# Patient Record
Sex: MALE | Race: WHITE | NOT HISPANIC OR LATINO | Employment: OTHER | URBAN - METROPOLITAN AREA
[De-identification: names, ages, dates, MRNs, and addresses within clinical notes are randomized per-mention and may not be internally consistent; named-entity substitution may affect disease eponyms.]

---

## 2017-06-19 ENCOUNTER — OFFICE VISIT (OUTPATIENT)
Dept: PULMONOLOGY | Facility: CLINIC | Age: 82
End: 2017-06-19

## 2017-06-19 VITALS
OXYGEN SATURATION: 96 % | SYSTOLIC BLOOD PRESSURE: 126 MMHG | TEMPERATURE: 97.9 F | DIASTOLIC BLOOD PRESSURE: 80 MMHG | HEIGHT: 68 IN | BODY MASS INDEX: 26.76 KG/M2 | RESPIRATION RATE: 16 BRPM | HEART RATE: 60 BPM | WEIGHT: 176.6 LBS

## 2017-06-19 DIAGNOSIS — J45.20 CHRONIC ASTHMA, MILD INTERMITTENT, UNCOMPLICATED: ICD-10-CM

## 2017-06-19 DIAGNOSIS — J84.10 CHRONIC FIBROSIS OF LUNG (HCC): Primary | ICD-10-CM

## 2017-06-19 DIAGNOSIS — J98.6 DIAPHRAGMATIC PARALYSIS: ICD-10-CM

## 2017-06-19 DIAGNOSIS — J30.1 SEASONAL ALLERGIC RHINITIS DUE TO POLLEN: ICD-10-CM

## 2017-06-19 PROCEDURE — 99214 OFFICE O/P EST MOD 30 MIN: CPT | Performed by: INTERNAL MEDICINE

## 2017-06-19 PROCEDURE — 94726 PLETHYSMOGRAPHY LUNG VOLUMES: CPT | Performed by: INTERNAL MEDICINE

## 2017-06-19 PROCEDURE — 94620 PR PULMONARY STRESS TESTING,SIMPLE: CPT | Performed by: INTERNAL MEDICINE

## 2017-06-19 PROCEDURE — 94729 DIFFUSING CAPACITY: CPT | Performed by: INTERNAL MEDICINE

## 2017-06-19 PROCEDURE — 71020 CHG CHEST X-RAY 2 VW: CPT | Performed by: INTERNAL MEDICINE

## 2017-06-19 PROCEDURE — 94060 EVALUATION OF WHEEZING: CPT | Performed by: INTERNAL MEDICINE

## 2017-06-19 NOTE — PATIENT INSTRUCTIONS
Your breathing test today showed some mild loss of lung capacity. To make sure that this is not a progressive pattern, I will want to recheck these studies in 6 months.    Recommendation:  1. Continue to get yearly flu vaccine in the fall  2. Follow-up in the office with repeat breathing test in 6 months  3. We will walk you in the office today to make sure that your oxygen saturation remains stable. If I determine that you require oxygen with activity, I will want to get a CT scan of your chest to make sure you are not developing increased pulmonary fibrosis

## 2017-06-19 NOTE — PROGRESS NOTES
Subjective   Amador White is an 81 y.o.  white male whom I followed since 10/29/2008 for the following problems:  1. Chronic pulmonary fibrosis in the lower lobes right greater than left  A. Pleural calcifications, either asbestos or related to severe injury after thoracic surgery for aneurysm repair  B. Bronchiectasis equivalent with recurrent infections and previously required rotating antibiotics  C. Chronic fibrotic rales in the bases right greater than left  2. Status post thoracic aortic aneurysm repair 1993  A. 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 Chronic mild asthma never required regular therapy  4. Allergic rhinitis  5. Hypertension  6. History of tremor now diagnosed as Parkinson's since I last saw him  7. History of migraines  8. Hypothyroid on replacement  9. Parkinsonism  History of Present Illness   I last saw the patient 9/15/16 at which point he told me he had a new diagnosis of Parkinson's disease and had been put on Sinemet. He is followed by both Dr. Dumont as well as Dr. Maza who specializes in Parkinson's disease at [facility].    Since his last visit his weight has increased about 8 pounds he feels due to inactivity. His Parkinson's is under reasonable control but he does have some gait abnormalities as well as jaw in hand tremor making him less active. He is stable however and performs his usual activities around the house.    From a pulmonary standpoint he denies chronic cough, purulent sputum production, hemoptysis or pleuritic pain. He does note occasional shortness of breath that is not necessarily exertionally related. It seems more related to weather changes and increase in humidity. He will note shortness of breath and a chest heaviness at rest that resolves when he uses his prn albuterol. Only does this about once a week and he does note some wheezing with the episode. Denies any allergic symptomatology is present and has not had to  use his Flovent on a regular basis.    The following portions of the patient's history were reviewed and updated as appropriate: allergies, current medications, past family history, past medical history, past social history, past surgical history and problem list.    Review of Systems   Constitutional: Positive for fatigue. Negative for appetite change, chills, diaphoresis, fever and unexpected weight change.   HENT: Negative for congestion, ear discharge, ear pain, hearing loss, postnasal drip, rhinorrhea, sinus pressure, sneezing, sore throat, trouble swallowing and voice change.    Eyes: Negative for visual disturbance.   Respiratory: Positive for shortness of breath and wheezing (rarely). Negative for cough, choking, chest tightness and stridor.    Cardiovascular: Negative for chest pain, palpitations and leg swelling.   Gastrointestinal: Negative for abdominal pain, anal bleeding, blood in stool, constipation, diarrhea, nausea and vomiting.   Endocrine: Negative for cold intolerance, heat intolerance, polydipsia and polyuria.   Genitourinary: Negative for decreased urine volume, difficulty urinating, dysuria, frequency, hematuria and urgency.   Musculoskeletal: Negative for arthralgias, back pain, joint swelling and myalgias.   Skin: Negative for pallor and rash.   Allergic/Immunologic: Negative for environmental allergies, food allergies and immunocompromised state.   Neurological: Positive for tremors, weakness and headaches (cluster/migraine headaches twice a month). Negative for dizziness, seizures, syncope and speech difficulty.        Mild gait instability   Hematological: Negative for adenopathy. Does not bruise/bleed easily.   Psychiatric/Behavioral: Positive for sleep disturbance. Negative for confusion and decreased concentration. The patient is not nervous/anxious.    All other systems reviewed and are negative.      Prior to Admission medications    Medication Sig Start Date End Date Taking?  "Authorizing Provider   albuterol (VENTOLIN HFA) 108 (90 BASE) MCG/ACT inhaler as needed. 4/7/15  Yes Historical Provider, MD   carbidopa-levodopa (SINEMET)  MG per tablet Take 2 tablets by mouth 3 (three) times a day. 8/29/16  Yes Historical Provider, MD   divalproex (DEPAKOTE) 500 MG 24 hr tablet Take 1 tablet by mouth daily. 8/12/16  Yes Historical Provider, MD   escitalopram (LEXAPRO) 20 MG tablet Take 1 tablet by mouth daily. 8/17/16  Yes Historical Provider, MD   finasteride (PROSCAR) 5 MG tablet Take 1 tablet by mouth daily. 7/5/16  Yes Historical Provider, MD   fluticasone (FLONASE) 50 MCG/ACT nasal spray into each nostril as needed. 8/15/13  Yes Historical Provider, MD   gabapentin (NEURONTIN) 300 MG capsule Take 10 capsules by mouth daily. 9/8/16  Yes Historical Provider, MD   levothyroxine (SYNTHROID, LEVOTHROID) 50 MCG tablet Take 1 tablet by mouth daily. 9/1/16  Yes Historical Provider, MD   propranolol LA (INDERAL LA) 120 MG 24 hr capsule Take 1 capsule by mouth daily. 7/31/16  Yes Historical Provider, MD   simvastatin (ZOCOR) 20 MG tablet Take 0.5 tablets by mouth daily. 9/1/16  Yes Historical Provider, MD   SUMAtriptan (IMITREX) 100 MG tablet as needed. 8/30/16  Yes Historical Provider, MD   topiramate (TOPAMAX) 200 MG tablet Take 1 tablet by mouth daily. 8/1/16  Yes Historical Provider, MD       Objective   Blood pressure 126/80, pulse 60, temperature 97.9 °F (36.6 °C), resp. rate 16, height 68\" (172.7 cm), weight 176 lb 9.6 oz (80.1 kg), SpO2 96 %.    Flowsheet Rows         First Filed Value    Admission Height  68\" (172.7 cm) Documented at 06/19/2017 1036    Admission Weight  176 lb 9.6 oz (80.1 kg) Documented at 06/19/2017 1036        Physical Exam   Constitutional: He is oriented to person, place, and time. He appears well-developed and well-nourished.   Elderly white male who appears younger than stated age   HENT:   Head: Normocephalic and atraumatic.   Right Ear: Hearing and external ear " normal.   Left Ear: Hearing and external ear normal.   Nose: Nose normal.   Mouth/Throat: Oropharynx is clear and moist. No oropharyngeal exudate.   Decreased hearing and wears hearing aids   Eyes: Conjunctivae and EOM are normal. Pupils are equal, round, and reactive to light. Right eye exhibits no discharge. Left eye exhibits no discharge. No scleral icterus.   Neck: Normal range of motion. Neck supple. No JVD present. No tracheal deviation present. No thyromegaly present.   Cardiovascular: Normal rate, regular rhythm, normal heart sounds and intact distal pulses.  Exam reveals no gallop and no friction rub.    No murmur heard.  Pulmonary/Chest: Effort normal. No accessory muscle usage or stridor. No apnea, no tachypnea and no bradypnea. No respiratory distress. He has no wheezes. He has no rhonchi. He has rales (persistent rales only in the right lower lobe posteriorly). He exhibits no tenderness.   Diminished diaphragmatic movement on left   Abdominal: Soft. Bowel sounds are normal. He exhibits no distension and no mass. There is no splenomegaly or hepatomegaly. There is no tenderness. There is no rebound and no guarding. No hernia.   Musculoskeletal: Normal range of motion. He exhibits no edema, tenderness or deformity.   Lymphadenopathy:        Head (right side): No submandibular adenopathy present.        Head (left side): No submandibular adenopathy present.     He has no cervical adenopathy.        Right: No supraclavicular and no epitrochlear adenopathy present.        Left: No supraclavicular and no epitrochlear adenopathy present.   Neurological: He is alert and oriented to person, place, and time. He has normal reflexes. He displays normal reflexes. No cranial nerve deficit. He exhibits normal muscle tone. Coordination normal.   Skin: Skin is warm and dry. No bruising, no ecchymosis, no petechiae and no rash noted. He is not diaphoretic. No cyanosis or erythema. No pallor. Nails show no clubbing.    Psychiatric: He has a normal mood and affect. His speech is normal and behavior is normal. Judgment and thought content normal.   Nursing note and vitals reviewed.    PFTs: FVC 2.05 (55%), FEV1 1.34 (51%), FEV1/FVC ratio 65%, MVV 46 (61%), TLC 3.5 one (60%), RV 1.5 one (59%), RV/TLC ratio 43%, DLCO 13.0 73% this study indicates a mixed moderate obstructive, mild restrictive ventilatory defect. FEV1 has dropped from 1.7 to liters, to 1.34 L since 4/7/15.    Resting and excise O2 sat: No evidence of oxygen desaturation    Assessment/Plan     1. Chronic pulmonary fibrosis in the lower lobes right greater than left  A. Pleural calcifications, either asbestos or related to severe injury after thoracic surgery for aneurysm repair  B. Bronchiectasis equivalent with recurrent infections and previously required rotating antibiotics  C. Chronic fibrotic rales in the bases right greater than left  2. Status post thoracic aortic aneurysm repair 1993  A. 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 Chronic mild asthma never required regular therapy  4. Allergic rhinitis  5. Hypertension  6. History of tremor now diagnosed as Parkinson's since I last saw him  7. History of migraines  8. Hypothyroid on replacement  9. Parkinsonism    Plan:  1. Resting and excise O2 sat as noted above looks good. Had it not I would have obtained a CT scan  2. I plan to follow-up full PFTs in 6 months  A. If any progressive restriction I will reassess the need for a CT scan  B. If any increasing obstruction I would put him on a combination product rather than just prn albuterol    Gautam Castañeda MD  Pulmonary and Critical Care Medicine  06/19/17 12:04 PM

## 2018-01-08 ENCOUNTER — OFFICE VISIT (OUTPATIENT)
Dept: PULMONOLOGY | Facility: CLINIC | Age: 83
End: 2018-01-08

## 2018-01-08 VITALS
OXYGEN SATURATION: 92 % | TEMPERATURE: 96.8 F | DIASTOLIC BLOOD PRESSURE: 70 MMHG | WEIGHT: 178.5 LBS | RESPIRATION RATE: 16 BRPM | HEIGHT: 68 IN | HEART RATE: 60 BPM | BODY MASS INDEX: 27.05 KG/M2 | SYSTOLIC BLOOD PRESSURE: 110 MMHG

## 2018-01-08 DIAGNOSIS — J45.909 ASTHMA, UNSPECIFIED ASTHMA SEVERITY, UNSPECIFIED WHETHER COMPLICATED, UNSPECIFIED WHETHER PERSISTENT: Primary | ICD-10-CM

## 2018-01-08 DIAGNOSIS — J47.1 BRONCHIECTASIS WITH ACUTE EXACERBATION (HCC): ICD-10-CM

## 2018-01-08 DIAGNOSIS — J45.20 CHRONIC ASTHMA, MILD INTERMITTENT, UNCOMPLICATED: ICD-10-CM

## 2018-01-08 DIAGNOSIS — J84.10 CHRONIC FIBROSIS OF LUNG (HCC): ICD-10-CM

## 2018-01-08 DIAGNOSIS — J98.6 DIAPHRAGMATIC PARALYSIS: ICD-10-CM

## 2018-01-08 DIAGNOSIS — J30.1 SEASONAL ALLERGIC RHINITIS DUE TO POLLEN: ICD-10-CM

## 2018-01-08 PROCEDURE — 94060 EVALUATION OF WHEEZING: CPT | Performed by: INTERNAL MEDICINE

## 2018-01-08 PROCEDURE — 99215 OFFICE O/P EST HI 40 MIN: CPT | Performed by: INTERNAL MEDICINE

## 2018-01-08 PROCEDURE — 94726 PLETHYSMOGRAPHY LUNG VOLUMES: CPT | Performed by: INTERNAL MEDICINE

## 2018-01-08 PROCEDURE — 94729 DIFFUSING CAPACITY: CPT | Performed by: INTERNAL MEDICINE

## 2018-01-08 RX ORDER — AMOXICILLIN AND CLAVULANATE POTASSIUM 500; 125 MG/1; MG/1
TABLET, FILM COATED ORAL
COMMUNITY
Start: 2018-01-06 | End: 2018-01-23

## 2018-01-08 RX ORDER — FLUTICASONE PROPIONATE 220 UG/1
1 AEROSOL, METERED RESPIRATORY (INHALATION)
COMMUNITY
Start: 2017-11-01 | End: 2020-01-01 | Stop reason: ALTCHOICE

## 2018-01-08 NOTE — PROGRESS NOTES
Pulmonary Office Follow Up      Subjective   Chief Complaint: Productive Cough    Amador White is a 82 y.o. male is being seen in follow up for Asthma, Pulmonary Fibrosis and Bronchiectasis  History of Present Illness    Mr. White is an 81yo M who was previously followed by Dr. Gautam Castañeda. He was last seen in clinic on 6/19/17. At that time, he was doing well. His O2 saturation was good resting and with exercise. He returns today with Full PFTs for review.     Since he was last seen, he reports that he has been treated for pneumonia three times. He was treated twice in November, first with Doxycycline and then with Augmentin. He was most recently prescribed Augmentin again on 1/6/18 for recurrent productive cough and fever. He reports that he and his wife traveled to [place] for the holidays where they both became sick with upper respiratory symptoms. Since that time, he has noted intermittent fevers (Tmax of 100) as well as a productive cough. He was previously on alternating rounds of antibiotics for bronchiectasis which he thought helps. He does not use a Flutter Valve or have any kind of percussion device. He is not sure if the Augmentin has started to help or not. He is not using his Albuterol on a frequent basis.     The following portions of the patient's history were reviewed and updated as appropriate: allergies, current medications, past family history, past medical history, past social history, past surgical history and problem list.    Review of Systems   Constitutional: Positive for fatigue and fever.   HENT: Positive for congestion and rhinorrhea.    Eyes: Negative.    Respiratory: Positive for cough and shortness of breath.    Cardiovascular: Negative.    Gastrointestinal: Negative.    Genitourinary: Negative.    Musculoskeletal: Negative.    Neurological: Negative.    Psychiatric/Behavioral: Negative.          Objective   Blood pressure 110/70, pulse 60, temperature 96.8 °F (36 °C), resp. rate 16,  "height 172.7 cm (67.99\"), weight 81 kg (178 lb 8 oz), SpO2 92 %.  Physical Exam   Constitutional: He is oriented to person, place, and time. He appears well-developed and well-nourished. No distress.   HENT:   Head: Normocephalic and atraumatic.   Mouth/Throat: Oropharynx is clear and moist.   Eyes: Conjunctivae are normal. Pupils are equal, round, and reactive to light. No scleral icterus.   Neck: Normal range of motion. Neck supple. No tracheal deviation present. No thyromegaly present.   Cardiovascular: Normal rate, regular rhythm and normal heart sounds.    Pulmonary/Chest: Effort normal. No respiratory distress. He has rales in the right lower field and the left lower field.   Abdominal: Soft. Bowel sounds are normal. There is no tenderness.   Musculoskeletal: Normal range of motion. He exhibits no edema.   Lymphadenopathy:     He has no cervical adenopathy.   Neurological: He is alert and oriented to person, place, and time. He exhibits normal muscle tone. Coordination normal.   Skin: Skin is warm and dry. No rash noted. No erythema.   Psychiatric: He has a normal mood and affect. His speech is normal and behavior is normal. Judgment normal.   Nursing note and vitals reviewed.      PFTs:  Spirometry was performed in clinic and personally reviewed.   There is severe airway obstruction. There is no response to bronchodilators.   There is mild to moderate restriction.   DLCO is normal when corrected for lung volumes.     Imaging:  I have reviewed the OSH CXR performed this past weekend. There are no infiltrates. There are no acute cardiopulmonary processes.     Assessment/Plan   Amador was seen today for chronic fibrosis of lung.    Diagnoses and all orders for this visit:    Asthma, unspecified asthma severity, unspecified whether complicated, unspecified whether persistent    Chronic fibrosis of lung  -     Pulmonary Function Test    Left Diaphragmatic paralysis postop aneurysm repair  -     Pulmonary Function " Test    Chronic asthma, mild intermittent, uncomplicated  -     Pulmonary Function Test    Seasonal allergic rhinitis due to pollen  -     Pulmonary Function Test    Bronchiectasis with acute exacerbation        Discussion:  Mr. White is an 83yo M previously followed by Dr. Gautam Castañeda the following problems:    1. Chronic pulmonary fibrosis in the lower lobes right greater than left  A. Pleural calcifications, either asbestos or related to severe injury after thoracic surgery for aneurysm repair  B. Bronchiectasis equivalent with recurrent infections and previously required rotating antibiotics  C. Chronic fibrotic rales in the bases right greater than left  2. Status post thoracic aortic aneurysm repair 1993  A. 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 Chronic mild asthma never required regular therapy  4. Allergic rhinitis  5. Hypertension  6. History of Parkinson's  7. History of migraines  8. Hypothyroid on replacement  9. Parkinsonism    Plan:  1. Continue Augmentin for exacerbation of bronchiectasis  2. Rx provided for a Flutter Valve. I have asked the patient to use this at least 4x daily.   3. If he continues to have recurrent episodes of infection, he may need to be restarted on rotating antibiotics. He was previously on Azithromycin/Augmentin.   4. Will wait to add any new medications for obstructive airway disease as reduction in PFTs may be secondary to acute illness.   5. Follow up in 2 weeks to ensure he is improving.       I personally spent over half of a total 40 minutes face to face with the patient in counseling and discussion and/or coordination of care as described above.     Kailyn Purcell, DO  Pulmonary and Critical Care Medicine

## 2018-01-23 ENCOUNTER — OFFICE VISIT (OUTPATIENT)
Dept: PULMONOLOGY | Facility: CLINIC | Age: 83
End: 2018-01-23

## 2018-01-23 VITALS
WEIGHT: 172.25 LBS | DIASTOLIC BLOOD PRESSURE: 80 MMHG | RESPIRATION RATE: 18 BRPM | SYSTOLIC BLOOD PRESSURE: 114 MMHG | HEIGHT: 68 IN | HEART RATE: 78 BPM | TEMPERATURE: 98.2 F | BODY MASS INDEX: 26.11 KG/M2 | OXYGEN SATURATION: 97 %

## 2018-01-23 DIAGNOSIS — J47.9 BRONCHIECTASIS WITHOUT COMPLICATION (HCC): ICD-10-CM

## 2018-01-23 DIAGNOSIS — J84.10 CHRONIC FIBROSIS OF LUNG (HCC): Primary | ICD-10-CM

## 2018-01-23 DIAGNOSIS — J45.909 CHRONIC ASTHMA WITHOUT COMPLICATION, UNSPECIFIED ASTHMA SEVERITY, UNSPECIFIED WHETHER PERSISTENT: ICD-10-CM

## 2018-01-23 PROCEDURE — 99214 OFFICE O/P EST MOD 30 MIN: CPT | Performed by: INTERNAL MEDICINE

## 2018-01-23 RX ORDER — NITROFURANTOIN 25; 75 MG/1; MG/1
CAPSULE ORAL
COMMUNITY
Start: 2018-01-22 | End: 2018-09-06

## 2018-01-23 NOTE — PROGRESS NOTES
"Pulmonary Office Follow Up      Subjective   Chief Complaint: Cough    Amador White is a 82 y.o. male is being seen in follow up for Pulmonary Fibrosis, Asthma and Bronchiectasis    History of Present Illness    Mr. White is an 83yo M who was previously followed by Dr. Gautam Castañeda. He was last seen by me in clinic on 1/8/18. At that time, he reported that he has been treated for pneumonia three times. He was treated twice in November, first with Doxycycline and then with Augmentin. He was most recently prescribed Augmentin again on 1/6/18 for recurrent productive cough and fever. At that visit, I prescribed a flutter valve for him and asked him to finish his course of Augmentin.     Today, he reports that his symptoms are much improved. He has some coughing mostly in the morning and in the evenings but his sputum is now mostly clear. He occasionally has some light yellow sputum production. He picked up an Aerobika Flutter Valve and is using this 3-4 times daily. He reports that it helps him cough up sputum. He has not had any further fever or chills. He was recently seen by his pcp and started on Macrobid for a UTI.       The following portions of the patient's history were reviewed and updated as appropriate: allergies, current medications, past family history, past medical history, past social history, past surgical history and problem list.    Review of Systems   Constitutional: Negative.    HENT: Negative.    Respiratory: Positive for cough.    Cardiovascular: Negative.    Gastrointestinal: Negative.    Psychiatric/Behavioral: Negative.          Objective   Blood pressure 114/80, pulse 78, temperature 98.2 °F (36.8 °C), resp. rate 18, height 172.7 cm (67.99\"), weight 78.1 kg (172 lb 4 oz), SpO2 97 %.  Physical Exam   Constitutional: He is oriented to person, place, and time. He appears well-developed and well-nourished. No distress.   HENT:   Head: Normocephalic and atraumatic.   Mouth/Throat: Oropharynx is " clear and moist.   Eyes: Conjunctivae are normal. Pupils are equal, round, and reactive to light. No scleral icterus.   Neck: Normal range of motion. Neck supple. No tracheal deviation present. No thyromegaly present.   Cardiovascular: Normal rate, regular rhythm and normal heart sounds.    Pulmonary/Chest: Effort normal. No respiratory distress. He has rales in the right lower field and the left lower field.   Abdominal: Soft. Bowel sounds are normal. There is no tenderness.   Musculoskeletal: Normal range of motion. He exhibits no edema.   Lymphadenopathy:     He has no cervical adenopathy.   Neurological: He is alert and oriented to person, place, and time. He exhibits normal muscle tone. Coordination normal.   Skin: Skin is warm and dry. No rash noted. No erythema.   Psychiatric: He has a normal mood and affect. His speech is normal and behavior is normal. Judgment normal.   Nursing note and vitals reviewed.      PFTs:  No new PFTs.     Imaging:  No new imaging.     Assessment/Plan   Amador was seen today for asthma.    Diagnoses and all orders for this visit:    Chronic fibrosis of lung    Chronic asthma without complication, unspecified asthma severity, unspecified whether persistent  -     Spirometry Without Bronchodilator; Standing    Bronchiectasis without complication  -     Spirometry Without Bronchodilator; Standing      Discussion:  Mr. White is an 81yo M previously followed by Dr. Gautam Castañeda the following problems:     1. Chronic pulmonary fibrosis in the lower lobes right greater than left  A. Pleural calcifications, either asbestos or related to severe injury after thoracic surgery for aneurysm repair  B. Bronchiectasis equivalent with recurrent infections and previously required rotating antibiotics  C. Chronic fibrotic rales in the bases right greater than left  2. Status post thoracic aortic aneurysm repair 1993  A. 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 Chronic mild  asthma never required regular therapy  4. Allergic rhinitis  5. Hypertension  6. History of Parkinson's  7. History of migraines  8. Hypothyroid on replacement  9. Parkinsonism     Plan:  1. Continue to hold alternating antibiotics as he is much improved after the course of Augmentin.   2. Continue Flutter valve at least twice daily. May use more frequently if needed.   3. Follow up in 3-4 months with repeat spirometry at that time.   4. He was instructed to call us for a sooner appointment if he develops symptoms of recurrent infection.       I personally spent over half of a total 30 minutes face to face with the patient in counseling and discussion and/or coordination of care as described above.     Kailyn Purcell, DO  Pulmonary and Critical Care Medicine

## 2018-03-23 ENCOUNTER — LAB REQUISITION (OUTPATIENT)
Dept: LAB | Facility: HOSPITAL | Age: 83
End: 2018-03-23

## 2018-03-23 DIAGNOSIS — Z12.11 ENCOUNTER FOR SCREENING FOR MALIGNANT NEOPLASM OF COLON: ICD-10-CM

## 2018-03-23 PROCEDURE — 88305 TISSUE EXAM BY PATHOLOGIST: CPT | Performed by: INTERNAL MEDICINE

## 2018-03-26 LAB
CYTO UR: NORMAL
LAB AP CASE REPORT: NORMAL
LAB AP CLINICAL INFORMATION: NORMAL
Lab: NORMAL
PATH REPORT.FINAL DX SPEC: NORMAL
PATH REPORT.GROSS SPEC: NORMAL

## 2018-05-03 ENCOUNTER — OFFICE VISIT (OUTPATIENT)
Dept: PULMONOLOGY | Facility: CLINIC | Age: 83
End: 2018-05-03

## 2018-05-03 VITALS
TEMPERATURE: 97.5 F | WEIGHT: 172 LBS | HEIGHT: 68 IN | BODY MASS INDEX: 26.07 KG/M2 | OXYGEN SATURATION: 98 % | HEART RATE: 64 BPM | DIASTOLIC BLOOD PRESSURE: 82 MMHG | RESPIRATION RATE: 16 BRPM | SYSTOLIC BLOOD PRESSURE: 130 MMHG

## 2018-05-03 DIAGNOSIS — J45.909 ASTHMA, UNSPECIFIED ASTHMA SEVERITY, UNSPECIFIED WHETHER COMPLICATED, UNSPECIFIED WHETHER PERSISTENT: Primary | ICD-10-CM

## 2018-05-03 DIAGNOSIS — J47.9 BRONCHIECTASIS WITHOUT COMPLICATION (HCC): ICD-10-CM

## 2018-05-03 DIAGNOSIS — J30.1 SEASONAL ALLERGIC RHINITIS DUE TO POLLEN, UNSPECIFIED CHRONICITY: ICD-10-CM

## 2018-05-03 DIAGNOSIS — J98.6 DIAPHRAGMATIC PARALYSIS: ICD-10-CM

## 2018-05-03 PROCEDURE — 99213 OFFICE O/P EST LOW 20 MIN: CPT | Performed by: INTERNAL MEDICINE

## 2018-05-03 PROCEDURE — 94060 EVALUATION OF WHEEZING: CPT | Performed by: INTERNAL MEDICINE

## 2018-05-03 RX ORDER — ALBUTEROL SULFATE 90 UG/1
4 AEROSOL, METERED RESPIRATORY (INHALATION) ONCE
Status: COMPLETED | OUTPATIENT
Start: 2018-05-03 | End: 2018-05-03

## 2018-05-03 RX ORDER — PRAMIPEXOLE DIHYDROCHLORIDE 0.25 MG/1
0.25 TABLET ORAL DAILY
COMMUNITY
Start: 2018-03-19

## 2018-05-03 RX ORDER — LEVOTHYROXINE SODIUM 0.07 MG/1
75 TABLET ORAL DAILY
COMMUNITY
Start: 2018-01-30

## 2018-05-03 RX ORDER — TOPIRAMATE 100 MG/1
100 TABLET, FILM COATED ORAL DAILY
COMMUNITY
Start: 2018-04-02

## 2018-05-03 RX ORDER — CYCLOBENZAPRINE HCL 5 MG
TABLET ORAL AS NEEDED
COMMUNITY
Start: 2018-02-13

## 2018-05-03 RX ADMIN — ALBUTEROL SULFATE 4 PUFF: 90 AEROSOL, METERED RESPIRATORY (INHALATION) at 09:16

## 2018-05-03 NOTE — PROGRESS NOTES
"Pulmonary Office Follow Up      Subjective   Chief Complaint: Cough    Amador White is a 82 y.o. male is being seen in follow up for Pulmonary Fibrosis, Asthma and Bronchiectasis    History of Present Illness    Mr. White is an 83yo M who was previously followed by Dr. Gautam Castañeda. He was last seen by me in clinic on 1/23/18.     Since his last visit, he has been doing well. He has not had any exacerbations or episodes of pneumonia. He continues to use his flutter valve. He did have 1 episode of chills approximately 4 days ago but has not had any since. He has a chronic cough but feels that his symptoms are overall well controlled with the flutter valve.     The following portions of the patient's history were reviewed and updated as appropriate: allergies, current medications, past family history, past medical history, past social history, past surgical history and problem list.    Review of Systems   Constitutional: Negative.    HENT: Negative.    Respiratory: Positive for cough.    Cardiovascular: Negative.    Gastrointestinal: Negative.    Endocrine: Negative.    Musculoskeletal: Negative.    Skin: Negative.    Allergic/Immunologic: Negative.    Neurological: Negative.    Psychiatric/Behavioral: Negative.          Objective   Blood pressure 130/82, pulse 64, temperature 97.5 °F (36.4 °C), resp. rate 16, height 172.7 cm (68\"), weight 78 kg (172 lb), SpO2 98 %.  Physical Exam   Constitutional: He is oriented to person, place, and time. He appears well-developed and well-nourished. No distress.   HENT:   Head: Normocephalic and atraumatic.   Mouth/Throat: Oropharynx is clear and moist.   Eyes: Conjunctivae are normal. Pupils are equal, round, and reactive to light. No scleral icterus.   Neck: Normal range of motion. Neck supple. No tracheal deviation present. No thyromegaly present.   Cardiovascular: Normal rate, regular rhythm and normal heart sounds.    Pulmonary/Chest: Effort normal. No respiratory distress. " He has rales in the right lower field and the left lower field.   Abdominal: Soft. Bowel sounds are normal. There is no tenderness.   Musculoskeletal: Normal range of motion. He exhibits no edema.   Lymphadenopathy:     He has no cervical adenopathy.   Neurological: He is alert and oriented to person, place, and time. He exhibits normal muscle tone. Coordination normal.   Skin: Skin is warm and dry. No rash noted. No erythema.   Psychiatric: He has a normal mood and affect. His speech is normal and behavior is normal. Judgment normal.   Nursing note and vitals reviewed.      PFTs:  Spirometry performed in clinic and personally reviewed.   There is moderate airway obstruction. FEV1 is increased from 1.19 to 1.38 when compared to 1/8/18.   There is no significant response to bronchodilators.     Imaging:  No new imaging.     Assessment/Plan   Amador was seen today for other.    Diagnoses and all orders for this visit:    Asthma, unspecified asthma severity, unspecified whether complicated, unspecified whether persistent  -     Spirometry With Bronchodilator  -     albuterol (PROVENTIL HFA;VENTOLIN HFA) inhaler 4 puff; Inhale 4 puffs 1 (One) Time.    Bronchiectasis without complication    Left Diaphragmatic paralysis/postop    Seasonal allergic rhinitis due to pollen, unspecified chronicity        Discussion:  Mr. White is an 81yo M previously followed by Dr. Gautam Castañeda the following problems:     1. Chronic pulmonary fibrosis in the lower lobes right greater than left  A. Pleural calcifications, either asbestos or related to severe injury after thoracic surgery for aneurysm repair  B. Bronchiectasis equivalent with recurrent infections and previously required rotating antibiotics  C. Chronic fibrotic rales in the bases right greater than left  2. Status post thoracic aortic aneurysm repair 1993  A. 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 Chronic mild asthma never required regular  therapy  4. Allergic rhinitis  5. Hypertension  6. History of Parkinson's  7. History of migraines  8. Hypothyroid on replacement  9. Parkinsonism     Plan:  1. Continue to hold alternating antibiotics as he has not had any infections since his last visit.   2. Continue Flutter valve at least twice daily. May use more frequently if needed.   3. Follow up in 4 months with repeat spirometry at that time.   4. He was instructed to call us for a sooner appointment if he develops symptoms of recurrent infection.       I personally spent over half of a total 15 minutes face to face with the patient in counseling and discussion and/or coordination of care as described above.     Kailyn Purcell, DO  Pulmonary and Critical Care Medicine

## 2018-07-20 ENCOUNTER — RESULTS ENCOUNTER (OUTPATIENT)
Dept: PULMONOLOGY | Facility: CLINIC | Age: 83
End: 2018-07-20

## 2018-07-20 DIAGNOSIS — J47.9 BRONCHIECTASIS WITHOUT COMPLICATION (HCC): ICD-10-CM

## 2018-07-20 DIAGNOSIS — J45.909 CHRONIC ASTHMA WITHOUT COMPLICATION, UNSPECIFIED ASTHMA SEVERITY, UNSPECIFIED WHETHER PERSISTENT: ICD-10-CM

## 2018-09-06 ENCOUNTER — OFFICE VISIT (OUTPATIENT)
Dept: PULMONOLOGY | Facility: CLINIC | Age: 83
End: 2018-09-06

## 2018-09-06 VITALS
DIASTOLIC BLOOD PRESSURE: 74 MMHG | TEMPERATURE: 97.2 F | SYSTOLIC BLOOD PRESSURE: 102 MMHG | RESPIRATION RATE: 16 BRPM | OXYGEN SATURATION: 96 % | HEART RATE: 62 BPM | BODY MASS INDEX: 26.07 KG/M2 | WEIGHT: 172 LBS | HEIGHT: 68 IN

## 2018-09-06 DIAGNOSIS — J84.10 CHRONIC FIBROSIS OF LUNG (HCC): Primary | ICD-10-CM

## 2018-09-06 DIAGNOSIS — J47.9 BRONCHIECTASIS WITHOUT COMPLICATION (HCC): ICD-10-CM

## 2018-09-06 DIAGNOSIS — J30.1 SEASONAL ALLERGIC RHINITIS DUE TO POLLEN, UNSPECIFIED CHRONICITY: ICD-10-CM

## 2018-09-06 DIAGNOSIS — J45.909 CHRONIC ASTHMA WITHOUT COMPLICATION, UNSPECIFIED ASTHMA SEVERITY, UNSPECIFIED WHETHER PERSISTENT: ICD-10-CM

## 2018-09-06 PROCEDURE — 94060 EVALUATION OF WHEEZING: CPT | Performed by: INTERNAL MEDICINE

## 2018-09-06 PROCEDURE — 99213 OFFICE O/P EST LOW 20 MIN: CPT | Performed by: INTERNAL MEDICINE

## 2018-09-06 RX ORDER — ALBUTEROL SULFATE 90 UG/1
2 AEROSOL, METERED RESPIRATORY (INHALATION) EVERY 6 HOURS PRN
Qty: 1 INHALER | Refills: 6 | Status: SHIPPED | OUTPATIENT
Start: 2018-09-06

## 2018-09-06 RX ORDER — ALBUTEROL SULFATE 90 UG/1
4 AEROSOL, METERED RESPIRATORY (INHALATION) ONCE
Status: COMPLETED | OUTPATIENT
Start: 2018-09-06 | End: 2018-09-06

## 2018-09-06 RX ADMIN — ALBUTEROL SULFATE 4 PUFF: 90 AEROSOL, METERED RESPIRATORY (INHALATION) at 08:49

## 2018-09-06 NOTE — PROGRESS NOTES
"Pulmonary Office Follow Up      Subjective   Chief Complaint: Cough    Amador White is a 82 y.o. male is being seen in follow up for Pulmonary Fibrosis, Asthma and Bronchiectasis    History of Present Illness    Mr. White is an 81yo M who was previously followed by Dr. Gautam Castañeda. He was last seen by me in clinic on 5/3/18.     Since his last visit, he has been doing well. He has not had any exacerbations or episodes of pneumonia. He continues to use his flutter valve. He did have 1 episode of that resolved with Tylenol since his last visit. He has a chronic cough but feels that his symptoms are overall well controlled with the flutter valve. He has not needed to use his Flovent or his rescue inhaler.     The following portions of the patient's history were reviewed and updated as appropriate: allergies, current medications, past family history, past medical history, past social history, past surgical history and problem list.    Review of Systems   Constitutional: Positive for fever.   HENT: Positive for postnasal drip and rhinorrhea.    Eyes: Positive for redness.   Respiratory: Negative.    Cardiovascular: Negative.    Gastrointestinal: Negative.    Endocrine: Negative.    Musculoskeletal: Negative.    Skin: Negative.    Allergic/Immunologic: Positive for environmental allergies.   Neurological: Positive for tremors and headaches.   Psychiatric/Behavioral: Negative.          Objective   Blood pressure 102/74, pulse 62, temperature 97.2 °F (36.2 °C), resp. rate 16, height 172.7 cm (68\"), weight 78 kg (172 lb), SpO2 96 %.  Physical Exam   Constitutional: He is oriented to person, place, and time. He appears well-developed and well-nourished. No distress.   HENT:   Head: Normocephalic and atraumatic.   Mouth/Throat: Oropharynx is clear and moist.   Eyes: Pupils are equal, round, and reactive to light. Conjunctivae are normal. No scleral icterus.   Neck: Normal range of motion. Neck supple. No tracheal deviation " present. No thyromegaly present.   Cardiovascular: Normal rate, regular rhythm and normal heart sounds.    Pulmonary/Chest: Effort normal. No respiratory distress. He has rales in the right lower field and the left lower field.   Abdominal: Soft. Bowel sounds are normal. There is no tenderness.   Musculoskeletal: Normal range of motion. He exhibits no edema.   Lymphadenopathy:     He has no cervical adenopathy.   Neurological: He is alert and oriented to person, place, and time. He exhibits normal muscle tone. Coordination normal.   Skin: Skin is warm and dry. No rash noted. No erythema.   Psychiatric: He has a normal mood and affect. His speech is normal and behavior is normal. Judgment normal.   Nursing note and vitals reviewed.      PFTs:  Spirometry performed in clinic and personally reviewed.   There is moderate airway obstruction. FEV1 is increased from 1.38 to 1.48 when compared to May 2018.   There is no significant response to bronchodilators.     Imaging:  No new imaging.     Assessment/Plan   Amador was seen today for asthma.    Diagnoses and all orders for this visit:    Chronic fibrosis of lung (CMS/[facility])  -     albuterol (PROVENTIL HFA;VENTOLIN HFA) inhaler 4 puff; Inhale 4 puffs 1 (One) Time.  -     Spirometry With Bronchodilator    Seasonal allergic rhinitis due to pollen, unspecified chronicity    Bronchiectasis without complication (CMS/[facility])    Chronic asthma without complication, unspecified asthma severity, unspecified whether persistent    Other orders  -     albuterol (VENTOLIN HFA) 108 (90 Base) MCG/ACT inhaler; Inhale 2 puffs Every 6 (Six) Hours As Needed for Wheezing or Shortness of Air.        Discussion:  Mr. White is an 83yo M previously followed by Dr. Gautam Castañeda the following problems:     1. Chronic pulmonary fibrosis in the lower lobes right greater than left  A. Pleural calcifications, either asbestos or related to severe injury after thoracic surgery for aneurysm repair  B.  Bronchiectasis equivalent with recurrent infections and previously required rotating antibiotics  C. Chronic fibrotic rales in the bases right greater than left  2. Status post thoracic aortic aneurysm repair 1993  A. 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 Chronic mild asthma never required regular therapy  4. Allergic rhinitis  5. Hypertension  6. History of Parkinson's  7. History of migraines  8. Hypothyroid on replacement  9. Parkinsonism     Plan:  1. Continue to hold alternating antibiotics as he has not had any infections since his last visit.   2. Continue Flutter valve at least twice daily. May use more frequently if needed.   3. Refill sent for Albuterol  4. Follow up in 6 months with repeat spirometry.   5. He was instructed to call us for a sooner appointment if he develops symptoms of recurrent infection.       I personally spent over half of a total 15 minutes face to face with the patient in counseling and discussion and/or coordination of care as described above.     Kailyn Purcell, DO  Pulmonary and Critical Care Medicine

## 2019-01-18 ENCOUNTER — RESULTS ENCOUNTER (OUTPATIENT)
Dept: PULMONOLOGY | Facility: CLINIC | Age: 84
End: 2019-01-18

## 2019-01-18 DIAGNOSIS — J45.909 CHRONIC ASTHMA WITHOUT COMPLICATION, UNSPECIFIED ASTHMA SEVERITY, UNSPECIFIED WHETHER PERSISTENT: ICD-10-CM

## 2019-01-18 DIAGNOSIS — J47.9 BRONCHIECTASIS WITHOUT COMPLICATION (HCC): ICD-10-CM

## 2019-03-20 ENCOUNTER — OFFICE VISIT (OUTPATIENT)
Dept: PULMONOLOGY | Facility: CLINIC | Age: 84
End: 2019-03-20

## 2019-03-20 VITALS
OXYGEN SATURATION: 95 % | WEIGHT: 167.6 LBS | DIASTOLIC BLOOD PRESSURE: 80 MMHG | HEART RATE: 60 BPM | HEIGHT: 68 IN | BODY MASS INDEX: 25.4 KG/M2 | SYSTOLIC BLOOD PRESSURE: 120 MMHG | TEMPERATURE: 97.7 F

## 2019-03-20 DIAGNOSIS — J98.6 DIAPHRAGMATIC PARALYSIS: ICD-10-CM

## 2019-03-20 DIAGNOSIS — J47.9 BRONCHIECTASIS WITHOUT COMPLICATION (HCC): ICD-10-CM

## 2019-03-20 DIAGNOSIS — J45.909 CHRONIC ASTHMA, UNSPECIFIED ASTHMA SEVERITY, UNSPECIFIED WHETHER COMPLICATED, UNSPECIFIED WHETHER PERSISTENT: Primary | ICD-10-CM

## 2019-03-20 DIAGNOSIS — J30.1 SEASONAL ALLERGIC RHINITIS DUE TO POLLEN: ICD-10-CM

## 2019-03-20 DIAGNOSIS — J84.10 CHRONIC FIBROSIS OF LUNG (HCC): ICD-10-CM

## 2019-03-20 PROCEDURE — 94375 RESPIRATORY FLOW VOLUME LOOP: CPT | Performed by: INTERNAL MEDICINE

## 2019-03-20 PROCEDURE — 99213 OFFICE O/P EST LOW 20 MIN: CPT | Performed by: INTERNAL MEDICINE

## 2019-03-20 NOTE — PROGRESS NOTES
"Pulmonary Office Follow Up      Subjective   Chief Complaint: Cough    Amador White is a 83 y.o. male is being seen in follow up for Pulmonary Fibrosis, Asthma and Bronchiectasis    History of Present Illness    Mr. White is an 84yo M who was previously followed by Dr. Gautam Castañeda. He was last seen by me in clinic on 9/6/18.     Since his last visit, he has been doing well. He has not had any exacerbations or episodes of pneumonia. He continues to use his flutter valve. He has not had any fever. He has not needed to use his albuterol rescue inhaler. He does have some trouble with seasonal allergies and anticipates some allergy problems coming in the Spring.      The following portions of the patient's history were reviewed and updated as appropriate: allergies, current medications, past family history, past medical history, past social history, past surgical history and problem list.    Review of Systems   Constitutional: Positive for fatigue.   HENT: Positive for rhinorrhea.    Eyes: Positive for photophobia.   Respiratory: Negative.    Cardiovascular: Negative.    Gastrointestinal: Positive for constipation and diarrhea.   Endocrine: Negative.    Genitourinary: Negative.    Musculoskeletal: Positive for gait problem.   Skin: Negative.    Allergic/Immunologic: Negative.    Neurological: Positive for headaches.   Hematological: Negative.    Psychiatric/Behavioral: Positive for sleep disturbance.         Objective   Blood pressure 120/80, pulse 60, temperature 97.7 °F (36.5 °C), height 172.7 cm (68\"), weight 76 kg (167 lb 9.6 oz), SpO2 95 %.  Physical Exam   Constitutional: He is oriented to person, place, and time. He appears well-developed and well-nourished. No distress.   HENT:   Head: Normocephalic and atraumatic.   Mouth/Throat: Oropharynx is clear and moist.   Eyes: Conjunctivae are normal. Pupils are equal, round, and reactive to light. No scleral icterus.   Neck: Normal range of motion. Neck supple. No " tracheal deviation present. No thyromegaly present.   Cardiovascular: Normal rate, regular rhythm and normal heart sounds.   Pulmonary/Chest: Effort normal. No respiratory distress. He has rales in the right lower field and the left lower field.   Abdominal: Soft. Bowel sounds are normal. There is no tenderness.   Musculoskeletal: Normal range of motion. He exhibits no edema.   Lymphadenopathy:     He has no cervical adenopathy.   Neurological: He is alert and oriented to person, place, and time. He exhibits normal muscle tone. Coordination normal.   Skin: Skin is warm and dry. No rash noted. No erythema.   Psychiatric: He has a normal mood and affect. His speech is normal and behavior is normal. Judgment normal.   Nursing note and vitals reviewed.      PFTs:  Spirometry performed in clinic and personally reviewed.   There is moderate airway obstruction. FEV1 is decreased from 1.48L to 1.39L when compared to 9/6/18       Imaging:  No new imaging.     Assessment/Plan   Amador was seen today for follow-up.    Diagnoses and all orders for this visit:    Chronic asthma, unspecified asthma severity, unspecified whether complicated, unspecified whether persistent  -     Spirometry Without Bronchodilator    Bronchiectasis without complication (CMS/HCC)    Chronic fibrosis of lung (CMS/HCC)    Left Diaphragmatic paralysis/postop    Seasonal allergic rhinitis due to pollen        Discussion:  Mr. White is an 82yo M previously followed by Dr. Gautam Castañeda the following problems:     1. Chronic pulmonary fibrosis in the lower lobes right greater than left  A. Pleural calcifications, either asbestos or related to severe injury after thoracic surgery for aneurysm repair  B. Bronchiectasis equivalent with recurrent infections and previously required rotating antibiotics  C. Chronic fibrotic rales in the bases right greater than left  2. Status post thoracic aortic aneurysm repair 1993  A. Residual paralyzed left hemidiaphragm  with restrictive defect  3. Chronic mild asthma never required regular therapy  4. Allergic rhinitis  5. Hypertension  6. History of Parkinson's  7. History of migraines  8. Hypothyroid on replacement  9. Parkinsonism     Plan:  1. Continue to hold alternating antibiotics as he has not had any infections since his last visit.   2. Continue Flutter valve at least twice daily. May use more frequently if needed.   3. Continue OTC antihistamines as needed for seasonal allergies.   4. Follow up in 6 months with repeat spirometry.   5. He was instructed to call us for a sooner appointment if he develops symptoms of recurrent infection.       I personally spent over half of a total 15 minutes face to face with the patient in counseling and discussion and/or coordination of care as described above.     Kailyn MEDINA Case, DO  Pulmonary and Critical Care Medicine  Note electronically signed

## 2020-01-01 ENCOUNTER — OFFICE VISIT (OUTPATIENT)
Dept: PULMONOLOGY | Facility: CLINIC | Age: 85
End: 2020-01-01

## 2020-01-01 ENCOUNTER — TELEPHONE (OUTPATIENT)
Dept: PULMONOLOGY | Facility: CLINIC | Age: 85
End: 2020-01-01

## 2020-01-01 ENCOUNTER — APPOINTMENT (OUTPATIENT)
Dept: PREADMISSION TESTING | Facility: HOSPITAL | Age: 85
End: 2020-01-01

## 2020-01-01 VITALS
WEIGHT: 160 LBS | HEART RATE: 70 BPM | OXYGEN SATURATION: 98 % | TEMPERATURE: 97.8 F | HEIGHT: 68 IN | SYSTOLIC BLOOD PRESSURE: 122 MMHG | BODY MASS INDEX: 24.25 KG/M2 | DIASTOLIC BLOOD PRESSURE: 76 MMHG

## 2020-01-01 DIAGNOSIS — J30.1 SEASONAL ALLERGIC RHINITIS DUE TO POLLEN: ICD-10-CM

## 2020-01-01 DIAGNOSIS — J45.909 CHRONIC ASTHMA WITHOUT COMPLICATION, UNSPECIFIED ASTHMA SEVERITY, UNSPECIFIED WHETHER PERSISTENT: ICD-10-CM

## 2020-01-01 DIAGNOSIS — J84.10 CHRONIC FIBROSIS OF LUNG (HCC): Primary | ICD-10-CM

## 2020-01-01 DIAGNOSIS — J98.6 DIAPHRAGMATIC PARALYSIS: ICD-10-CM

## 2020-01-01 DIAGNOSIS — J47.9 BRONCHIECTASIS WITHOUT COMPLICATION (HCC): ICD-10-CM

## 2020-01-01 LAB — SARS-COV-2 RNA RESP QL NAA+PROBE: NOT DETECTED

## 2020-01-01 PROCEDURE — 99213 OFFICE O/P EST LOW 20 MIN: CPT | Performed by: INTERNAL MEDICINE

## 2020-01-01 PROCEDURE — C9803 HOPD COVID-19 SPEC COLLECT: HCPCS

## 2020-01-01 PROCEDURE — U0004 COV-19 TEST NON-CDC HGH THRU: HCPCS

## 2020-07-08 NOTE — PROGRESS NOTES
"Pulmonary Office Follow Up      Subjective   Chief Complaint: Cough    Amador White is a 84 y.o. male is being seen in follow up for Pulmonary Fibrosis, Asthma and Bronchiectasis    History of Present Illness    Mr. White is an 83yo M who was previously followed by Dr. Gautam Castañeda. He was last seen by me in clinic on 3/20/19.      Since his last visit, he has been doing well. He has not had any exacerbations or episodes of pneumonia. He continues to use his flutter valve. He has not had any fever. He has not needed to use his albuterol rescue inhaler.     The following portions of the patient's history were reviewed and updated as appropriate: allergies, current medications, past family history, past medical history, past social history, past surgical history and problem list.    Review of Systems   Constitutional: Positive for fatigue.   Eyes: Negative.    Respiratory: Negative.    Cardiovascular: Negative.    Gastrointestinal: Negative.    Endocrine: Negative.    Genitourinary: Negative.    Musculoskeletal: Positive for gait problem.   Skin: Negative.    Allergic/Immunologic: Negative.    Hematological: Negative.    Psychiatric/Behavioral: Negative.          Objective   Blood pressure 122/76, pulse 70, temperature 97.8 °F (36.6 °C), height 172.7 cm (68\"), weight 72.6 kg (160 lb), SpO2 98 %.  Physical Exam   Constitutional: He is oriented to person, place, and time. He appears well-developed and well-nourished. No distress.   HENT:   Head: Normocephalic and atraumatic.   Mouth/Throat: Oropharynx is clear and moist.   Eyes: Pupils are equal, round, and reactive to light. Conjunctivae are normal. No scleral icterus.   Neck: Normal range of motion. Neck supple. No tracheal deviation present. No thyromegaly present.   Cardiovascular: Normal rate, regular rhythm and normal heart sounds.   Pulmonary/Chest: Effort normal. No respiratory distress. He has rales in the right lower field and the left lower field. "   Abdominal: Soft. Bowel sounds are normal. There is no tenderness.   Musculoskeletal: Normal range of motion. He exhibits no edema.   Lymphadenopathy:     He has no cervical adenopathy.   Neurological: He is alert and oriented to person, place, and time. He exhibits normal muscle tone. Coordination normal.   Skin: Skin is warm and dry. No rash noted. No erythema.   Psychiatric: He has a normal mood and affect. His speech is normal and behavior is normal. Judgment normal.   Nursing note and vitals reviewed.      PFTs:  No new PFTs.     Imaging:  No new imaging.     Assessment/Plan   Amador was seen today for asthma and follow-up.    Diagnoses and all orders for this visit:    Chronic fibrosis of lung (CMS/HCC)    Bronchiectasis without complication (CMS/HCC)    Chronic asthma without complication, unspecified asthma severity, unspecified whether persistent    Left Diaphragmatic paralysis/postop    Seasonal allergic rhinitis due to pollen        Discussion:  Mr. White is an 83yo M previously followed by Dr. Gautam Castañeda the following problems:     1. Chronic pulmonary fibrosis in the lower lobes right greater than left  A. Pleural calcifications, either asbestos or related to severe injury after thoracic surgery for aneurysm repair  B. Bronchiectasis equivalent with recurrent infections and previously required rotating antibiotics  C. Chronic fibrotic rales in the bases right greater than left  2. Status post thoracic aortic aneurysm repair 1993  A. 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 Chronic mild asthma never required regular therapy  4. Allergic rhinitis  5. Hypertension  6. History of Parkinson's  7. History of migraines  8. Hypothyroid on replacement  9. Parkinsonism     Plan:  1. Continue to hold alternating antibiotics as he has not had any infections since his last visit.   2. Continue Flutter valve at least twice daily. May use more frequently if needed.   3. Continue OTC  antihistamines as needed for seasonal allergies.   4. He may use his inhaler as needed.   5. Follow up in 1 year with repeat spirometry.   6. He was instructed to call us for a sooner appointment if he develops symptoms of recurrent infection.       I personally spent over half of a total 15 minutes face to face with the patient in counseling and discussion and/or coordination of care as described above.     Kailyn V Case, DO  Pulmonary and Critical Care Medicine  Note electronically signed

## 2020-11-17 NOTE — TELEPHONE ENCOUNTER
Pt called today requesting to go back on Rx Advair and to send to [facility]. Pt was seen by Dr Purcell on 7/8/20 and was using Rx Flovent HFA PRN but not seeing that this inhaler is helping. Pt can be reached @ [phone].

## 2020-12-16 NOTE — TELEPHONE ENCOUNTER
I returned her call, she stated they are having trouble getting him out of the chair and just called 911.  They are taking him to [facility] ED now.

## 2020-12-16 NOTE — TELEPHONE ENCOUNTER
Pt's wife Sandra called today stating that pt is suppose to have prostate surgery scheduled on 12/18 but has had to reschedule the last 3 times due to fever being above 100. Pt denies sob/cough/fatigue. Sandra can be reached @ [phone].

## 2021-01-01 ENCOUNTER — ANESTHESIA EVENT (OUTPATIENT)
Dept: GASTROENTEROLOGY | Facility: HOSPITAL | Age: 86
End: 2021-01-01

## 2021-01-01 ENCOUNTER — APPOINTMENT (OUTPATIENT)
Dept: CARDIOLOGY | Facility: HOSPITAL | Age: 86
End: 2021-01-01

## 2021-01-01 ENCOUNTER — ANESTHESIA (OUTPATIENT)
Dept: GASTROENTEROLOGY | Facility: HOSPITAL | Age: 86
End: 2021-01-01

## 2021-01-01 ENCOUNTER — APPOINTMENT (OUTPATIENT)
Dept: CT IMAGING | Facility: HOSPITAL | Age: 86
End: 2021-01-01

## 2021-01-01 ENCOUNTER — APPOINTMENT (OUTPATIENT)
Dept: GENERAL RADIOLOGY | Facility: HOSPITAL | Age: 86
End: 2021-01-01

## 2021-01-01 ENCOUNTER — HOSPITAL ENCOUNTER (INPATIENT)
Facility: HOSPITAL | Age: 86
LOS: 2 days | End: 2021-03-06
Attending: INTERNAL MEDICINE | Admitting: INTERNAL MEDICINE

## 2021-01-01 ENCOUNTER — APPOINTMENT (OUTPATIENT)
Dept: MRI IMAGING | Facility: HOSPITAL | Age: 86
End: 2021-01-01

## 2021-01-01 ENCOUNTER — HOSPITAL ENCOUNTER (INPATIENT)
Facility: HOSPITAL | Age: 86
LOS: 11 days | End: 2021-03-04
Attending: EMERGENCY MEDICINE | Admitting: HOSPITALIST

## 2021-01-01 VITALS
HEIGHT: 68 IN | BODY MASS INDEX: 22.66 KG/M2 | TEMPERATURE: 99.5 F | WEIGHT: 149.5 LBS | DIASTOLIC BLOOD PRESSURE: 50 MMHG | RESPIRATION RATE: 16 BRPM | SYSTOLIC BLOOD PRESSURE: 139 MMHG | HEART RATE: 81 BPM | OXYGEN SATURATION: 95 %

## 2021-01-01 DIAGNOSIS — K62.6 ULCER OF RECTUM: ICD-10-CM

## 2021-01-01 DIAGNOSIS — K92.2 ACUTE LOWER GI BLEEDING: ICD-10-CM

## 2021-01-01 DIAGNOSIS — R41.841 COGNITIVE COMMUNICATION DEFICIT: ICD-10-CM

## 2021-01-01 DIAGNOSIS — R13.12 OROPHARYNGEAL DYSPHAGIA: ICD-10-CM

## 2021-01-01 DIAGNOSIS — A41.9 SEPSIS, DUE TO UNSPECIFIED ORGANISM, UNSPECIFIED WHETHER ACUTE ORGAN DYSFUNCTION PRESENT (HCC): Primary | ICD-10-CM

## 2021-01-01 DIAGNOSIS — D64.9 ANEMIA, UNSPECIFIED TYPE: ICD-10-CM

## 2021-01-01 LAB
ABO GROUP BLD: NORMAL
ALBUMIN SERPL-MCNC: 2.3 G/DL (ref 3.5–5.2)
ALBUMIN SERPL-MCNC: 2.4 G/DL (ref 3.5–5.2)
ALBUMIN SERPL-MCNC: 3.2 G/DL (ref 3.5–5.2)
ALBUMIN/GLOB SERPL: 1 G/DL
ALBUMIN/GLOB SERPL: 1.1 G/DL
ALBUMIN/GLOB SERPL: 1.3 G/DL
ALP SERPL-CCNC: 66 U/L (ref 39–117)
ALP SERPL-CCNC: 66 U/L (ref 39–117)
ALP SERPL-CCNC: 83 U/L (ref 39–117)
ALT SERPL W P-5'-P-CCNC: <5 U/L (ref 1–41)
AMMONIA BLD-SCNC: 52 UMOL/L (ref 16–60)
ANION GAP SERPL CALCULATED.3IONS-SCNC: 4 MMOL/L (ref 5–15)
ANION GAP SERPL CALCULATED.3IONS-SCNC: 5 MMOL/L (ref 5–15)
ANION GAP SERPL CALCULATED.3IONS-SCNC: 6 MMOL/L (ref 5–15)
ANION GAP SERPL CALCULATED.3IONS-SCNC: 7 MMOL/L (ref 5–15)
ANION GAP SERPL CALCULATED.3IONS-SCNC: 8 MMOL/L (ref 5–15)
ANION GAP SERPL CALCULATED.3IONS-SCNC: 9 MMOL/L (ref 5–15)
APTT PPP: 35.1 SECONDS (ref 24–37)
AST SERPL-CCNC: 11 U/L (ref 1–40)
AST SERPL-CCNC: 12 U/L (ref 1–40)
AST SERPL-CCNC: 8 U/L (ref 1–40)
B PARAPERT DNA SPEC QL NAA+PROBE: NOT DETECTED
B PERT DNA SPEC QL NAA+PROBE: NOT DETECTED
BACTERIA SPEC AEROBE CULT: NO GROWTH
BACTERIA SPEC AEROBE CULT: NORMAL
BACTERIA UR QL AUTO: ABNORMAL /HPF
BASOPHILS # BLD AUTO: 0.01 10*3/MM3 (ref 0–0.2)
BASOPHILS # BLD AUTO: 0.02 10*3/MM3 (ref 0–0.2)
BASOPHILS # BLD AUTO: 0.03 10*3/MM3 (ref 0–0.2)
BASOPHILS # BLD AUTO: 0.03 10*3/MM3 (ref 0–0.2)
BASOPHILS NFR BLD AUTO: 0.2 % (ref 0–1.5)
BASOPHILS NFR BLD AUTO: 0.2 % (ref 0–1.5)
BASOPHILS NFR BLD AUTO: 0.3 % (ref 0–1.5)
BASOPHILS NFR BLD AUTO: 0.4 % (ref 0–1.5)
BH BB BLOOD EXPIRATION DATE: NORMAL
BH BB BLOOD TYPE BARCODE: 5100
BH BB DISPENSE STATUS: NORMAL
BH BB PRODUCT CODE: NORMAL
BH BB UNIT NUMBER: NORMAL
BH CV UPPER VENOUS LEFT AXILLARY PHASIC: NORMAL
BH CV UPPER VENOUS LEFT AXILLARY SPONT: NORMAL
BH CV UPPER VENOUS LEFT BASILIC FOREARM COMPRESS: NORMAL
BH CV UPPER VENOUS LEFT BASILIC UPPER COMPRESS: NORMAL
BH CV UPPER VENOUS LEFT BRACHIAL COMPRESS: NORMAL
BH CV UPPER VENOUS LEFT BRACHIAL PHASIC: NORMAL
BH CV UPPER VENOUS LEFT BRACHIAL SPONT: NORMAL
BH CV UPPER VENOUS LEFT CEPHALIC FOREARM COLOR: 1
BH CV UPPER VENOUS LEFT CEPHALIC FOREARM COMPRESS: NORMAL
BH CV UPPER VENOUS LEFT CEPHALIC UPPER COMPRESS: NORMAL
BH CV UPPER VENOUS LEFT INTERNAL JUGULAR COLOR: 1
BH CV UPPER VENOUS LEFT INTERNAL JUGULAR COMPRESS: NORMAL
BH CV UPPER VENOUS LEFT INTERNAL JUGULAR PHASIC: NORMAL
BH CV UPPER VENOUS LEFT INTERNAL JUGULAR SPONT: NORMAL
BH CV UPPER VENOUS LEFT RADIAL COMPRESS: NORMAL
BH CV UPPER VENOUS LEFT SUBCLAVIAN PHASIC: NORMAL
BH CV UPPER VENOUS LEFT SUBCLAVIAN SPONT: NORMAL
BH CV UPPER VENOUS LEFT ULNAR COMPRESS: NORMAL
BH CV UPPER VENOUS RIGHT INTERNAL JUGULAR COMPRESS: NORMAL
BH CV UPPER VENOUS RIGHT INTERNAL JUGULAR PHASIC: NORMAL
BH CV UPPER VENOUS RIGHT INTERNAL JUGULAR SPONT: NORMAL
BILIRUB SERPL-MCNC: 0.3 MG/DL (ref 0–1.2)
BILIRUB SERPL-MCNC: 0.4 MG/DL (ref 0–1.2)
BILIRUB SERPL-MCNC: 0.4 MG/DL (ref 0–1.2)
BILIRUB UR QL STRIP: ABNORMAL
BLD GP AB SCN SERPL QL: NEGATIVE
BLD GP AB SCN SERPL QL: NEGATIVE
BUN SERPL-MCNC: 10 MG/DL (ref 8–23)
BUN SERPL-MCNC: 11 MG/DL (ref 8–23)
BUN SERPL-MCNC: 14 MG/DL (ref 8–23)
BUN SERPL-MCNC: 19 MG/DL (ref 8–23)
BUN SERPL-MCNC: 22 MG/DL (ref 8–23)
BUN SERPL-MCNC: 25 MG/DL (ref 8–23)
BUN SERPL-MCNC: 26 MG/DL (ref 8–23)
BUN SERPL-MCNC: 28 MG/DL (ref 8–23)
BUN/CREAT SERPL: 22.7 (ref 7–25)
BUN/CREAT SERPL: 23.4 (ref 7–25)
BUN/CREAT SERPL: 30.4 (ref 7–25)
BUN/CREAT SERPL: 35.2 (ref 7–25)
BUN/CREAT SERPL: 37.1 (ref 7–25)
BUN/CREAT SERPL: 40 (ref 7–25)
BUN/CREAT SERPL: 44 (ref 7–25)
BUN/CREAT SERPL: 51 (ref 7–25)
C DIFF TOX GENS STL QL NAA+PROBE: NOT DETECTED
C PNEUM DNA NPH QL NAA+NON-PROBE: NOT DETECTED
CA-I SERPL ISE-MCNC: 1.2 MMOL/L (ref 1.12–1.32)
CALCIUM SPEC-SCNC: 7.6 MG/DL (ref 8.6–10.5)
CALCIUM SPEC-SCNC: 7.7 MG/DL (ref 8.6–10.5)
CALCIUM SPEC-SCNC: 7.8 MG/DL (ref 8.6–10.5)
CALCIUM SPEC-SCNC: 7.9 MG/DL (ref 8.6–10.5)
CALCIUM SPEC-SCNC: 8 MG/DL (ref 8.6–10.5)
CALCIUM SPEC-SCNC: 8 MG/DL (ref 8.6–10.5)
CALCIUM SPEC-SCNC: 8.1 MG/DL (ref 8.6–10.5)
CALCIUM SPEC-SCNC: 8.5 MG/DL (ref 8.6–10.5)
CHLORIDE SERPL-SCNC: 107 MMOL/L (ref 98–107)
CHLORIDE SERPL-SCNC: 110 MMOL/L (ref 98–107)
CHLORIDE SERPL-SCNC: 112 MMOL/L (ref 98–107)
CHLORIDE SERPL-SCNC: 113 MMOL/L (ref 98–107)
CHLORIDE SERPL-SCNC: 116 MMOL/L (ref 98–107)
CHLORIDE SERPL-SCNC: 118 MMOL/L (ref 98–107)
CHOLEST SERPL-MCNC: 89 MG/DL (ref 0–200)
CLARITY UR: CLEAR
CO2 SERPL-SCNC: 25 MMOL/L (ref 22–29)
CO2 SERPL-SCNC: 26 MMOL/L (ref 22–29)
CO2 SERPL-SCNC: 27 MMOL/L (ref 22–29)
CO2 SERPL-SCNC: 28 MMOL/L (ref 22–29)
CO2 SERPL-SCNC: 29 MMOL/L (ref 22–29)
CO2 SERPL-SCNC: 30 MMOL/L (ref 22–29)
COLOR UR: ABNORMAL
CREAT SERPL-MCNC: 0.44 MG/DL (ref 0.76–1.27)
CREAT SERPL-MCNC: 0.46 MG/DL (ref 0.76–1.27)
CREAT SERPL-MCNC: 0.47 MG/DL (ref 0.76–1.27)
CREAT SERPL-MCNC: 0.49 MG/DL (ref 0.76–1.27)
CREAT SERPL-MCNC: 0.5 MG/DL (ref 0.76–1.27)
CREAT SERPL-MCNC: 0.54 MG/DL (ref 0.76–1.27)
CREAT SERPL-MCNC: 0.7 MG/DL (ref 0.76–1.27)
CREAT SERPL-MCNC: 0.7 MG/DL (ref 0.76–1.27)
CROSSMATCH INTERPRETATION: NORMAL
CYTO UR: NORMAL
D-LACTATE SERPL-SCNC: 1.1 MMOL/L (ref 0.5–2)
D-LACTATE SERPL-SCNC: 1.5 MMOL/L (ref 0.5–2)
D-LACTATE SERPL-SCNC: 2 MMOL/L (ref 0.5–2)
D-LACTATE SERPL-SCNC: 2.3 MMOL/L (ref 0.5–2)
DEPRECATED RDW RBC AUTO: 55.8 FL (ref 37–54)
DEPRECATED RDW RBC AUTO: 58.2 FL (ref 37–54)
DEPRECATED RDW RBC AUTO: 59.1 FL (ref 37–54)
DEPRECATED RDW RBC AUTO: 60.9 FL (ref 37–54)
DEPRECATED RDW RBC AUTO: 62.1 FL (ref 37–54)
DEPRECATED RDW RBC AUTO: 62.6 FL (ref 37–54)
DEPRECATED RDW RBC AUTO: 62.8 FL (ref 37–54)
DEPRECATED RDW RBC AUTO: 63.1 FL (ref 37–54)
DEPRECATED RDW RBC AUTO: 65.3 FL (ref 37–54)
DEPRECATED RDW RBC AUTO: 65.8 FL (ref 37–54)
EOSINOPHIL # BLD AUTO: 0.02 10*3/MM3 (ref 0–0.4)
EOSINOPHIL # BLD AUTO: 0.03 10*3/MM3 (ref 0–0.4)
EOSINOPHIL # BLD AUTO: 0.05 10*3/MM3 (ref 0–0.4)
EOSINOPHIL # BLD AUTO: 0.07 10*3/MM3 (ref 0–0.4)
EOSINOPHIL # BLD AUTO: 0.09 10*3/MM3 (ref 0–0.4)
EOSINOPHIL # BLD AUTO: 0.09 10*3/MM3 (ref 0–0.4)
EOSINOPHIL NFR BLD AUTO: 0.4 % (ref 0.3–6.2)
EOSINOPHIL NFR BLD AUTO: 0.4 % (ref 0.3–6.2)
EOSINOPHIL NFR BLD AUTO: 0.5 % (ref 0.3–6.2)
EOSINOPHIL NFR BLD AUTO: 1.1 % (ref 0.3–6.2)
EOSINOPHIL NFR BLD AUTO: 1.6 % (ref 0.3–6.2)
EOSINOPHIL NFR BLD AUTO: 1.8 % (ref 0.3–6.2)
ERYTHROCYTE [DISTWIDTH] IN BLOOD BY AUTOMATED COUNT: 17.2 % (ref 12.3–15.4)
ERYTHROCYTE [DISTWIDTH] IN BLOOD BY AUTOMATED COUNT: 17.6 % (ref 12.3–15.4)
ERYTHROCYTE [DISTWIDTH] IN BLOOD BY AUTOMATED COUNT: 17.9 % (ref 12.3–15.4)
ERYTHROCYTE [DISTWIDTH] IN BLOOD BY AUTOMATED COUNT: 18 % (ref 12.3–15.4)
ERYTHROCYTE [DISTWIDTH] IN BLOOD BY AUTOMATED COUNT: 18.3 % (ref 12.3–15.4)
ERYTHROCYTE [DISTWIDTH] IN BLOOD BY AUTOMATED COUNT: 18.3 % (ref 12.3–15.4)
ERYTHROCYTE [DISTWIDTH] IN BLOOD BY AUTOMATED COUNT: 18.5 % (ref 12.3–15.4)
ERYTHROCYTE [DISTWIDTH] IN BLOOD BY AUTOMATED COUNT: 18.6 % (ref 12.3–15.4)
ERYTHROCYTE [DISTWIDTH] IN BLOOD BY AUTOMATED COUNT: 19.4 % (ref 12.3–15.4)
ERYTHROCYTE [DISTWIDTH] IN BLOOD BY AUTOMATED COUNT: 19.6 % (ref 12.3–15.4)
FERRITIN SERPL-MCNC: 221.4 NG/ML (ref 30–400)
FLUAV SUBTYP SPEC NAA+PROBE: NOT DETECTED
FLUBV RNA ISLT QL NAA+PROBE: NOT DETECTED
FOLATE SERPL-MCNC: 2.53 NG/ML (ref 4.78–24.2)
GFR SERPL CREATININE-BSD FRML MDRD: 107 ML/MIN/1.73
GFR SERPL CREATININE-BSD FRML MDRD: 107 ML/MIN/1.73
GFR SERPL CREATININE-BSD FRML MDRD: 145 ML/MIN/1.73
GFR SERPL CREATININE-BSD FRML MDRD: >150 ML/MIN/1.73
GLOBULIN UR ELPH-MCNC: 2.1 GM/DL
GLOBULIN UR ELPH-MCNC: 2.3 GM/DL
GLOBULIN UR ELPH-MCNC: 2.4 GM/DL
GLUCOSE BLDC GLUCOMTR-MCNC: 71 MG/DL (ref 70–130)
GLUCOSE BLDC GLUCOMTR-MCNC: 75 MG/DL (ref 70–130)
GLUCOSE BLDC GLUCOMTR-MCNC: 76 MG/DL (ref 70–130)
GLUCOSE BLDC GLUCOMTR-MCNC: 78 MG/DL (ref 70–130)
GLUCOSE BLDC GLUCOMTR-MCNC: 84 MG/DL (ref 70–130)
GLUCOSE SERPL-MCNC: 114 MG/DL (ref 65–99)
GLUCOSE SERPL-MCNC: 69 MG/DL (ref 65–99)
GLUCOSE SERPL-MCNC: 76 MG/DL (ref 65–99)
GLUCOSE SERPL-MCNC: 81 MG/DL (ref 65–99)
GLUCOSE SERPL-MCNC: 82 MG/DL (ref 65–99)
GLUCOSE SERPL-MCNC: 83 MG/DL (ref 65–99)
GLUCOSE SERPL-MCNC: 85 MG/DL (ref 65–99)
GLUCOSE SERPL-MCNC: 89 MG/DL (ref 65–99)
GLUCOSE UR STRIP-MCNC: NEGATIVE MG/DL
HADV DNA SPEC NAA+PROBE: NOT DETECTED
HBA1C MFR BLD: 4.9 % (ref 4.8–5.6)
HCOV 229E RNA SPEC QL NAA+PROBE: NOT DETECTED
HCOV HKU1 RNA SPEC QL NAA+PROBE: NOT DETECTED
HCOV NL63 RNA SPEC QL NAA+PROBE: NOT DETECTED
HCOV OC43 RNA SPEC QL NAA+PROBE: NOT DETECTED
HCT VFR BLD AUTO: 22.1 % (ref 37.5–51)
HCT VFR BLD AUTO: 23.9 % (ref 37.5–51)
HCT VFR BLD AUTO: 23.9 % (ref 37.5–51)
HCT VFR BLD AUTO: 24 % (ref 37.5–51)
HCT VFR BLD AUTO: 24.1 % (ref 37.5–51)
HCT VFR BLD AUTO: 25.1 % (ref 37.5–51)
HCT VFR BLD AUTO: 25.1 % (ref 37.5–51)
HCT VFR BLD AUTO: 25.7 % (ref 37.5–51)
HCT VFR BLD AUTO: 25.8 % (ref 37.5–51)
HCT VFR BLD AUTO: 25.9 % (ref 37.5–51)
HCT VFR BLD AUTO: 26.1 % (ref 37.5–51)
HCT VFR BLD AUTO: 26.3 % (ref 37.5–51)
HCT VFR BLD AUTO: 26.5 % (ref 37.5–51)
HCT VFR BLD AUTO: 27.2 % (ref 37.5–51)
HCT VFR BLD AUTO: 27.2 % (ref 37.5–51)
HCT VFR BLD AUTO: 27.4 % (ref 37.5–51)
HCT VFR BLD AUTO: 27.4 % (ref 37.5–51)
HCT VFR BLD AUTO: 28.3 % (ref 37.5–51)
HCT VFR BLD AUTO: 28.7 % (ref 37.5–51)
HCT VFR BLD AUTO: 29 % (ref 37.5–51)
HCT VFR BLD AUTO: 29.1 % (ref 37.5–51)
HCT VFR BLD AUTO: 30.1 % (ref 37.5–51)
HCT VFR BLD AUTO: 30.3 % (ref 37.5–51)
HCT VFR BLD AUTO: 33.5 % (ref 37.5–51)
HDLC SERPL-MCNC: 41 MG/DL (ref 40–60)
HGB BLD-MCNC: 6.6 G/DL (ref 13–17.7)
HGB BLD-MCNC: 7.2 G/DL (ref 13–17.7)
HGB BLD-MCNC: 7.3 G/DL (ref 13–17.7)
HGB BLD-MCNC: 7.4 G/DL (ref 13–17.7)
HGB BLD-MCNC: 7.5 G/DL (ref 13–17.7)
HGB BLD-MCNC: 7.6 G/DL (ref 13–17.7)
HGB BLD-MCNC: 7.7 G/DL (ref 13–17.7)
HGB BLD-MCNC: 7.7 G/DL (ref 13–17.7)
HGB BLD-MCNC: 7.9 G/DL (ref 13–17.7)
HGB BLD-MCNC: 7.9 G/DL (ref 13–17.7)
HGB BLD-MCNC: 8 G/DL (ref 13–17.7)
HGB BLD-MCNC: 8 G/DL (ref 13–17.7)
HGB BLD-MCNC: 8.1 G/DL (ref 13–17.7)
HGB BLD-MCNC: 8.2 G/DL (ref 13–17.7)
HGB BLD-MCNC: 8.4 G/DL (ref 13–17.7)
HGB BLD-MCNC: 8.6 G/DL (ref 13–17.7)
HGB BLD-MCNC: 8.6 G/DL (ref 13–17.7)
HGB BLD-MCNC: 8.7 G/DL (ref 13–17.7)
HGB BLD-MCNC: 8.8 G/DL (ref 13–17.7)
HGB BLD-MCNC: 9.1 G/DL (ref 13–17.7)
HGB BLD-MCNC: 9.1 G/DL (ref 13–17.7)
HGB BLD-MCNC: 9.8 G/DL (ref 13–17.7)
HGB UR QL STRIP.AUTO: NEGATIVE
HMPV RNA NPH QL NAA+NON-PROBE: NOT DETECTED
HOLD SPECIMEN: NORMAL
HPIV1 RNA SPEC QL NAA+PROBE: NOT DETECTED
HPIV2 RNA SPEC QL NAA+PROBE: NOT DETECTED
HPIV3 RNA NPH QL NAA+PROBE: NOT DETECTED
HPIV4 P GENE NPH QL NAA+PROBE: NOT DETECTED
HYALINE CASTS UR QL AUTO: ABNORMAL /LPF
IMM GRANULOCYTES # BLD AUTO: 0.02 10*3/MM3 (ref 0–0.05)
IMM GRANULOCYTES # BLD AUTO: 0.02 10*3/MM3 (ref 0–0.05)
IMM GRANULOCYTES # BLD AUTO: 0.04 10*3/MM3 (ref 0–0.05)
IMM GRANULOCYTES # BLD AUTO: 0.05 10*3/MM3 (ref 0–0.05)
IMM GRANULOCYTES # BLD AUTO: 0.09 10*3/MM3 (ref 0–0.05)
IMM GRANULOCYTES # BLD AUTO: 0.09 10*3/MM3 (ref 0–0.05)
IMM GRANULOCYTES NFR BLD AUTO: 0.4 % (ref 0–0.5)
IMM GRANULOCYTES NFR BLD AUTO: 0.4 % (ref 0–0.5)
IMM GRANULOCYTES NFR BLD AUTO: 0.7 % (ref 0–0.5)
IMM GRANULOCYTES NFR BLD AUTO: 0.9 % (ref 0–0.5)
IMM GRANULOCYTES NFR BLD AUTO: 1 % (ref 0–0.5)
IMM GRANULOCYTES NFR BLD AUTO: 1.1 % (ref 0–0.5)
INR PPP: 1.19 (ref 0.85–1.16)
INR PPP: 1.33 (ref 0.85–1.16)
INR PPP: 1.42 (ref 0.85–1.16)
IRON 24H UR-MRATE: 36 MCG/DL (ref 59–158)
IRON SATN MFR SERPL: 19 % (ref 20–50)
KETONES UR QL STRIP: ABNORMAL
LAB AP CASE REPORT: NORMAL
LAB AP CLINICAL INFORMATION: NORMAL
LACTATE HOLD SPECIMEN: NORMAL
LDLC SERPL CALC-MCNC: 29 MG/DL (ref 0–100)
LDLC/HDLC SERPL: 0.69 {RATIO}
LEUKOCYTE ESTERASE UR QL STRIP.AUTO: ABNORMAL
LYMPHOCYTES # BLD AUTO: 0.79 10*3/MM3 (ref 0.7–3.1)
LYMPHOCYTES # BLD AUTO: 0.88 10*3/MM3 (ref 0.7–3.1)
LYMPHOCYTES # BLD AUTO: 0.89 10*3/MM3 (ref 0.7–3.1)
LYMPHOCYTES # BLD AUTO: 0.91 10*3/MM3 (ref 0.7–3.1)
LYMPHOCYTES # BLD AUTO: 0.94 10*3/MM3 (ref 0.7–3.1)
LYMPHOCYTES # BLD AUTO: 1.28 10*3/MM3 (ref 0.7–3.1)
LYMPHOCYTES NFR BLD AUTO: 10 % (ref 19.6–45.3)
LYMPHOCYTES NFR BLD AUTO: 10.6 % (ref 19.6–45.3)
LYMPHOCYTES NFR BLD AUTO: 12 % (ref 19.6–45.3)
LYMPHOCYTES NFR BLD AUTO: 15.6 % (ref 19.6–45.3)
LYMPHOCYTES NFR BLD AUTO: 20.8 % (ref 19.6–45.3)
LYMPHOCYTES NFR BLD AUTO: 26.5 % (ref 19.6–45.3)
M PNEUMO IGG SER IA-ACNC: NOT DETECTED
MAGNESIUM SERPL-MCNC: 1.8 MG/DL (ref 1.6–2.4)
MCH RBC QN AUTO: 27.4 PG (ref 26.6–33)
MCH RBC QN AUTO: 28 PG (ref 26.6–33)
MCH RBC QN AUTO: 28 PG (ref 26.6–33)
MCH RBC QN AUTO: 28.1 PG (ref 26.6–33)
MCH RBC QN AUTO: 28.1 PG (ref 26.6–33)
MCH RBC QN AUTO: 28.2 PG (ref 26.6–33)
MCH RBC QN AUTO: 28.2 PG (ref 26.6–33)
MCH RBC QN AUTO: 28.3 PG (ref 26.6–33)
MCH RBC QN AUTO: 28.4 PG (ref 26.6–33)
MCH RBC QN AUTO: 28.6 PG (ref 26.6–33)
MCHC RBC AUTO-ENTMCNC: 28.7 G/DL (ref 31.5–35.7)
MCHC RBC AUTO-ENTMCNC: 29.3 G/DL (ref 31.5–35.7)
MCHC RBC AUTO-ENTMCNC: 29.7 G/DL (ref 31.5–35.7)
MCHC RBC AUTO-ENTMCNC: 29.9 G/DL (ref 31.5–35.7)
MCHC RBC AUTO-ENTMCNC: 30 G/DL (ref 31.5–35.7)
MCHC RBC AUTO-ENTMCNC: 30 G/DL (ref 31.5–35.7)
MCHC RBC AUTO-ENTMCNC: 30.2 G/DL (ref 31.5–35.7)
MCHC RBC AUTO-ENTMCNC: 30.3 G/DL (ref 31.5–35.7)
MCHC RBC AUTO-ENTMCNC: 30.4 G/DL (ref 31.5–35.7)
MCHC RBC AUTO-ENTMCNC: 31.8 G/DL (ref 31.5–35.7)
MCV RBC AUTO: 90.1 FL (ref 79–97)
MCV RBC AUTO: 92.7 FL (ref 79–97)
MCV RBC AUTO: 92.8 FL (ref 79–97)
MCV RBC AUTO: 93 FL (ref 79–97)
MCV RBC AUTO: 93.5 FL (ref 79–97)
MCV RBC AUTO: 93.5 FL (ref 79–97)
MCV RBC AUTO: 94.8 FL (ref 79–97)
MCV RBC AUTO: 95.4 FL (ref 79–97)
MCV RBC AUTO: 95.4 FL (ref 79–97)
MCV RBC AUTO: 96.3 FL (ref 79–97)
MONOCYTES # BLD AUTO: 0.3 10*3/MM3 (ref 0.1–0.9)
MONOCYTES # BLD AUTO: 0.44 10*3/MM3 (ref 0.1–0.9)
MONOCYTES # BLD AUTO: 0.44 10*3/MM3 (ref 0.1–0.9)
MONOCYTES # BLD AUTO: 0.48 10*3/MM3 (ref 0.1–0.9)
MONOCYTES # BLD AUTO: 0.57 10*3/MM3 (ref 0.1–0.9)
MONOCYTES # BLD AUTO: 0.7 10*3/MM3 (ref 0.1–0.9)
MONOCYTES NFR BLD AUTO: 4.7 % (ref 5–12)
MONOCYTES NFR BLD AUTO: 6.8 % (ref 5–12)
MONOCYTES NFR BLD AUTO: 7 % (ref 5–12)
MONOCYTES NFR BLD AUTO: 8.7 % (ref 5–12)
MONOCYTES NFR BLD AUTO: 9.2 % (ref 5–12)
MONOCYTES NFR BLD AUTO: 9.9 % (ref 5–12)
NEUTROPHILS NFR BLD AUTO: 2.96 10*3/MM3 (ref 1.7–7)
NEUTROPHILS NFR BLD AUTO: 3.01 10*3/MM3 (ref 1.7–7)
NEUTROPHILS NFR BLD AUTO: 3.69 10*3/MM3 (ref 1.7–7)
NEUTROPHILS NFR BLD AUTO: 5.87 10*3/MM3 (ref 1.7–7)
NEUTROPHILS NFR BLD AUTO: 6.69 10*3/MM3 (ref 1.7–7)
NEUTROPHILS NFR BLD AUTO: 62.4 % (ref 42.7–76)
NEUTROPHILS NFR BLD AUTO: 69.5 % (ref 42.7–76)
NEUTROPHILS NFR BLD AUTO: 7.85 10*3/MM3 (ref 1.7–7)
NEUTROPHILS NFR BLD AUTO: 73.1 % (ref 42.7–76)
NEUTROPHILS NFR BLD AUTO: 77.3 % (ref 42.7–76)
NEUTROPHILS NFR BLD AUTO: 80.2 % (ref 42.7–76)
NEUTROPHILS NFR BLD AUTO: 83.5 % (ref 42.7–76)
NITRITE UR QL STRIP: POSITIVE
NRBC BLD AUTO-RTO: 0 /100 WBC (ref 0–0.2)
PATH REPORT.FINAL DX SPEC: NORMAL
PATH REPORT.GROSS SPEC: NORMAL
PH UR STRIP.AUTO: <=5 [PH] (ref 5–8)
PLATELET # BLD AUTO: 109 10*3/MM3 (ref 140–450)
PLATELET # BLD AUTO: 112 10*3/MM3 (ref 140–450)
PLATELET # BLD AUTO: 115 10*3/MM3 (ref 140–450)
PLATELET # BLD AUTO: 131 10*3/MM3 (ref 140–450)
PLATELET # BLD AUTO: 138 10*3/MM3 (ref 140–450)
PLATELET # BLD AUTO: 146 10*3/MM3 (ref 140–450)
PLATELET # BLD AUTO: 148 10*3/MM3 (ref 140–450)
PLATELET # BLD AUTO: 150 10*3/MM3 (ref 140–450)
PLATELET # BLD AUTO: 85 10*3/MM3 (ref 140–450)
PLATELET # BLD AUTO: 93 10*3/MM3 (ref 140–450)
PMV BLD AUTO: 10 FL (ref 6–12)
PMV BLD AUTO: 10.1 FL (ref 6–12)
PMV BLD AUTO: 10.3 FL (ref 6–12)
PMV BLD AUTO: 10.4 FL (ref 6–12)
PMV BLD AUTO: 10.5 FL (ref 6–12)
PMV BLD AUTO: 10.6 FL (ref 6–12)
PMV BLD AUTO: 10.7 FL (ref 6–12)
PMV BLD AUTO: 11.2 FL (ref 6–12)
PMV BLD AUTO: 11.2 FL (ref 6–12)
PMV BLD AUTO: 11.5 FL (ref 6–12)
POTASSIUM SERPL-SCNC: 2.9 MMOL/L (ref 3.5–5.2)
POTASSIUM SERPL-SCNC: 2.9 MMOL/L (ref 3.5–5.2)
POTASSIUM SERPL-SCNC: 3.1 MMOL/L (ref 3.5–5.2)
POTASSIUM SERPL-SCNC: 3.3 MMOL/L (ref 3.5–5.2)
POTASSIUM SERPL-SCNC: 3.5 MMOL/L (ref 3.5–5.2)
POTASSIUM SERPL-SCNC: 3.6 MMOL/L (ref 3.5–5.2)
POTASSIUM SERPL-SCNC: 3.8 MMOL/L (ref 3.5–5.2)
POTASSIUM SERPL-SCNC: 3.9 MMOL/L (ref 3.5–5.2)
POTASSIUM SERPL-SCNC: 4.3 MMOL/L (ref 3.5–5.2)
PROCALCITONIN SERPL-MCNC: 0.07 NG/ML (ref 0–0.25)
PROT SERPL-MCNC: 4.5 G/DL (ref 6–8.5)
PROT SERPL-MCNC: 4.6 G/DL (ref 6–8.5)
PROT SERPL-MCNC: 5.6 G/DL (ref 6–8.5)
PROT UR QL STRIP: ABNORMAL
PROTHROMBIN TIME: 14.8 SECONDS (ref 11.5–14)
PROTHROMBIN TIME: 16.2 SECONDS (ref 11.5–14)
PROTHROMBIN TIME: 17 SECONDS (ref 11.5–14)
QT INTERVAL: 410 MS
QT INTERVAL: 450 MS
QT INTERVAL: 452 MS
QTC INTERVAL: 479 MS
QTC INTERVAL: 480 MS
QTC INTERVAL: 502 MS
RBC # BLD AUTO: 2.63 10*6/MM3 (ref 4.14–5.8)
RBC # BLD AUTO: 2.7 10*6/MM3 (ref 4.14–5.8)
RBC # BLD AUTO: 2.71 10*6/MM3 (ref 4.14–5.8)
RBC # BLD AUTO: 2.86 10*6/MM3 (ref 4.14–5.8)
RBC # BLD AUTO: 2.93 10*6/MM3 (ref 4.14–5.8)
RBC # BLD AUTO: 2.98 10*6/MM3 (ref 4.14–5.8)
RBC # BLD AUTO: 3.04 10*6/MM3 (ref 4.14–5.8)
RBC # BLD AUTO: 3.04 10*6/MM3 (ref 4.14–5.8)
RBC # BLD AUTO: 3.05 10*6/MM3 (ref 4.14–5.8)
RBC # BLD AUTO: 3.24 10*6/MM3 (ref 4.14–5.8)
RBC # UR: ABNORMAL /HPF
REF LAB TEST METHOD: ABNORMAL
RH BLD: POSITIVE
RHINOVIRUS RNA SPEC NAA+PROBE: NOT DETECTED
RSV RNA NPH QL NAA+NON-PROBE: NOT DETECTED
SARS-COV-2 RNA NPH QL NAA+NON-PROBE: NOT DETECTED
SARS-COV-2 RNA RESP QL NAA+PROBE: NOT DETECTED
SODIUM SERPL-SCNC: 141 MMOL/L (ref 136–145)
SODIUM SERPL-SCNC: 144 MMOL/L (ref 136–145)
SODIUM SERPL-SCNC: 148 MMOL/L (ref 136–145)
SODIUM SERPL-SCNC: 148 MMOL/L (ref 136–145)
SODIUM SERPL-SCNC: 149 MMOL/L (ref 136–145)
SODIUM SERPL-SCNC: 152 MMOL/L (ref 136–145)
SP GR UR STRIP: 1.04 (ref 1–1.03)
SQUAMOUS #/AREA URNS HPF: ABNORMAL /HPF
T&S EXPIRATION DATE: NORMAL
T&S EXPIRATION DATE: NORMAL
TIBC SERPL-MCNC: 191 MCG/DL (ref 298–536)
TRANSFERRIN SERPL-MCNC: 128 MG/DL (ref 200–360)
TRIGL SERPL-MCNC: 99 MG/DL (ref 0–150)
TROPONIN T SERPL-MCNC: 0.01 NG/ML (ref 0–0.03)
TSH SERPL DL<=0.05 MIU/L-ACNC: 5.34 UIU/ML (ref 0.27–4.2)
UNIT  ABO: NORMAL
UNIT  RH: NORMAL
UROBILINOGEN UR QL STRIP: ABNORMAL
VIT B12 BLD-MCNC: >2000 PG/ML (ref 211–946)
VLDLC SERPL-MCNC: 19 MG/DL (ref 5–40)
WBC # BLD AUTO: 4.27 10*3/MM3 (ref 3.4–10.8)
WBC # BLD AUTO: 4.83 10*3/MM3 (ref 3.4–10.8)
WBC # BLD AUTO: 5.05 10*3/MM3 (ref 3.4–10.8)
WBC # BLD AUTO: 5.16 10*3/MM3 (ref 3.4–10.8)
WBC # BLD AUTO: 5.71 10*3/MM3 (ref 3.4–10.8)
WBC # BLD AUTO: 5.88 10*3/MM3 (ref 3.4–10.8)
WBC # BLD AUTO: 6.9 10*3/MM3 (ref 3.4–10.8)
WBC # BLD AUTO: 7.59 10*3/MM3 (ref 3.4–10.8)
WBC # BLD AUTO: 8.34 10*3/MM3 (ref 3.4–10.8)
WBC # BLD AUTO: 9.4 10*3/MM3 (ref 3.4–10.8)
WBC UR QL AUTO: ABNORMAL /HPF
WHOLE BLOOD HOLD SPECIMEN: NORMAL
WHOLE BLOOD HOLD SPECIMEN: NORMAL

## 2021-01-01 PROCEDURE — 99231 SBSQ HOSP IP/OBS SF/LOW 25: CPT | Performed by: INTERNAL MEDICINE

## 2021-01-01 PROCEDURE — 36430 TRANSFUSION BLD/BLD COMPNT: CPT

## 2021-01-01 PROCEDURE — 86901 BLOOD TYPING SEROLOGIC RH(D): CPT

## 2021-01-01 PROCEDURE — 92611 MOTION FLUOROSCOPY/SWALLOW: CPT | Performed by: SPEECH-LANGUAGE PATHOLOGIST

## 2021-01-01 PROCEDURE — 80048 BASIC METABOLIC PNL TOTAL CA: CPT | Performed by: FAMILY MEDICINE

## 2021-01-01 PROCEDURE — 25010000002 PROPOFOL 10 MG/ML EMULSION: Performed by: NURSE ANESTHETIST, CERTIFIED REGISTERED

## 2021-01-01 PROCEDURE — P9016 RBC LEUKOCYTES REDUCED: HCPCS

## 2021-01-01 PROCEDURE — 92610 EVALUATE SWALLOWING FUNCTION: CPT

## 2021-01-01 PROCEDURE — 97535 SELF CARE MNGMENT TRAINING: CPT

## 2021-01-01 PROCEDURE — 86900 BLOOD TYPING SEROLOGIC ABO: CPT | Performed by: NURSE PRACTITIONER

## 2021-01-01 PROCEDURE — 85014 HEMATOCRIT: CPT | Performed by: FAMILY MEDICINE

## 2021-01-01 PROCEDURE — U0005 INFEC AGEN DETEC AMPLI PROBE: HCPCS | Performed by: INTERNAL MEDICINE

## 2021-01-01 PROCEDURE — 99233 SBSQ HOSP IP/OBS HIGH 50: CPT | Performed by: INTERNAL MEDICINE

## 2021-01-01 PROCEDURE — 93010 ELECTROCARDIOGRAM REPORT: CPT | Performed by: INTERNAL MEDICINE

## 2021-01-01 PROCEDURE — 94799 UNLISTED PULMONARY SVC/PX: CPT

## 2021-01-01 PROCEDURE — 80053 COMPREHEN METABOLIC PANEL: CPT

## 2021-01-01 PROCEDURE — 99232 SBSQ HOSP IP/OBS MODERATE 35: CPT | Performed by: INTERNAL MEDICINE

## 2021-01-01 PROCEDURE — 86900 BLOOD TYPING SEROLOGIC ABO: CPT

## 2021-01-01 PROCEDURE — 85018 HEMOGLOBIN: CPT | Performed by: INTERNAL MEDICINE

## 2021-01-01 PROCEDURE — 86923 COMPATIBILITY TEST ELECTRIC: CPT

## 2021-01-01 PROCEDURE — 45334 SIGMOIDOSCOPY FOR BLEEDING: CPT | Performed by: INTERNAL MEDICINE

## 2021-01-01 PROCEDURE — 85014 HEMATOCRIT: CPT | Performed by: INTERNAL MEDICINE

## 2021-01-01 PROCEDURE — 25010000002 LORAZEPAM PER 2 MG: Performed by: INTERNAL MEDICINE

## 2021-01-01 PROCEDURE — 25010000002 LORAZEPAM PER 2 MG: Performed by: NURSE PRACTITIONER

## 2021-01-01 PROCEDURE — 0W3P8ZZ CONTROL BLEEDING IN GASTROINTESTINAL TRACT, VIA NATURAL OR ARTIFICIAL OPENING ENDOSCOPIC: ICD-10-PCS | Performed by: INTERNAL MEDICINE

## 2021-01-01 PROCEDURE — 74174 CTA ABD&PLVS W/CONTRAST: CPT

## 2021-01-01 PROCEDURE — 85025 COMPLETE CBC W/AUTO DIFF WBC: CPT | Performed by: NURSE PRACTITIONER

## 2021-01-01 PROCEDURE — 93971 EXTREMITY STUDY: CPT

## 2021-01-01 PROCEDURE — 83036 HEMOGLOBIN GLYCOSYLATED A1C: CPT | Performed by: NURSE PRACTITIONER

## 2021-01-01 PROCEDURE — 82607 VITAMIN B-12: CPT | Performed by: PHYSICIAN ASSISTANT

## 2021-01-01 PROCEDURE — 25010000002 PIPERACILLIN SOD-TAZOBACTAM PER 1 G: Performed by: INTERNAL MEDICINE

## 2021-01-01 PROCEDURE — 80053 COMPREHEN METABOLIC PANEL: CPT | Performed by: PHYSICIAN ASSISTANT

## 2021-01-01 PROCEDURE — 82962 GLUCOSE BLOOD TEST: CPT

## 2021-01-01 PROCEDURE — 25010000002 ACYCLOVIR PER 5 MG: Performed by: INTERNAL MEDICINE

## 2021-01-01 PROCEDURE — 85730 THROMBOPLASTIN TIME PARTIAL: CPT | Performed by: FAMILY MEDICINE

## 2021-01-01 PROCEDURE — 25010000002 CEFTRIAXONE PER 250 MG: Performed by: PHYSICIAN ASSISTANT

## 2021-01-01 PROCEDURE — 84484 ASSAY OF TROPONIN QUANT: CPT | Performed by: PHYSICIAN ASSISTANT

## 2021-01-01 PROCEDURE — 82728 ASSAY OF FERRITIN: CPT | Performed by: PHYSICIAN ASSISTANT

## 2021-01-01 PROCEDURE — 84443 ASSAY THYROID STIM HORMONE: CPT | Performed by: FAMILY MEDICINE

## 2021-01-01 PROCEDURE — 85027 COMPLETE CBC AUTOMATED: CPT | Performed by: INTERNAL MEDICINE

## 2021-01-01 PROCEDURE — 83605 ASSAY OF LACTIC ACID: CPT | Performed by: INTERNAL MEDICINE

## 2021-01-01 PROCEDURE — 97530 THERAPEUTIC ACTIVITIES: CPT

## 2021-01-01 PROCEDURE — 83605 ASSAY OF LACTIC ACID: CPT

## 2021-01-01 PROCEDURE — U0003 INFECTIOUS AGENT DETECTION BY NUCLEIC ACID (DNA OR RNA); SEVERE ACUTE RESPIRATORY SYNDROME CORONAVIRUS 2 (SARS-COV-2) (CORONAVIRUS DISEASE [COVID-19]), AMPLIFIED PROBE TECHNIQUE, MAKING USE OF HIGH THROUGHPUT TECHNOLOGIES AS DESCRIBED BY CMS-2020-01-R: HCPCS | Performed by: INTERNAL MEDICINE

## 2021-01-01 PROCEDURE — 87040 BLOOD CULTURE FOR BACTERIA: CPT | Performed by: PHYSICIAN ASSISTANT

## 2021-01-01 PROCEDURE — 82746 ASSAY OF FOLIC ACID SERUM: CPT | Performed by: PHYSICIAN ASSISTANT

## 2021-01-01 PROCEDURE — 85025 COMPLETE CBC W/AUTO DIFF WBC: CPT | Performed by: PHYSICIAN ASSISTANT

## 2021-01-01 PROCEDURE — 25810000003 SODIUM CHLORIDE 0.9 % WITH KCL 20 MEQ 20-0.9 MEQ/L-% SOLUTION: Performed by: INTERNAL MEDICINE

## 2021-01-01 PROCEDURE — 93005 ELECTROCARDIOGRAM TRACING: CPT | Performed by: INTERNAL MEDICINE

## 2021-01-01 PROCEDURE — 25010000002 CEFTRIAXONE: Performed by: EMERGENCY MEDICINE

## 2021-01-01 PROCEDURE — 81001 URINALYSIS AUTO W/SCOPE: CPT | Performed by: EMERGENCY MEDICINE

## 2021-01-01 PROCEDURE — 85610 PROTHROMBIN TIME: CPT | Performed by: FAMILY MEDICINE

## 2021-01-01 PROCEDURE — 25010000002 MORPHINE PER 10 MG: Performed by: NURSE PRACTITIONER

## 2021-01-01 PROCEDURE — 93971 EXTREMITY STUDY: CPT | Performed by: INTERNAL MEDICINE

## 2021-01-01 PROCEDURE — 25010000002 FUROSEMIDE PER 20 MG: Performed by: NURSE PRACTITIONER

## 2021-01-01 PROCEDURE — 93005 ELECTROCARDIOGRAM TRACING: CPT | Performed by: EMERGENCY MEDICINE

## 2021-01-01 PROCEDURE — 25010000002 KETOROLAC TROMETHAMINE PER 15 MG: Performed by: NURSE PRACTITIONER

## 2021-01-01 PROCEDURE — P9612 CATHETERIZE FOR URINE SPEC: HCPCS

## 2021-01-01 PROCEDURE — 0042T HC CT CEREBRAL PERFUSION W/WO CONTRAST: CPT

## 2021-01-01 PROCEDURE — 87086 URINE CULTURE/COLONY COUNT: CPT | Performed by: FAMILY MEDICINE

## 2021-01-01 PROCEDURE — 84466 ASSAY OF TRANSFERRIN: CPT | Performed by: PHYSICIAN ASSISTANT

## 2021-01-01 PROCEDURE — 80053 COMPREHEN METABOLIC PANEL: CPT | Performed by: INTERNAL MEDICINE

## 2021-01-01 PROCEDURE — 71045 X-RAY EXAM CHEST 1 VIEW: CPT

## 2021-01-01 PROCEDURE — 70498 CT ANGIOGRAPHY NECK: CPT

## 2021-01-01 PROCEDURE — 84132 ASSAY OF SERUM POTASSIUM: CPT | Performed by: INTERNAL MEDICINE

## 2021-01-01 PROCEDURE — 80048 BASIC METABOLIC PNL TOTAL CA: CPT | Performed by: INTERNAL MEDICINE

## 2021-01-01 PROCEDURE — 85018 HEMOGLOBIN: CPT | Performed by: FAMILY MEDICINE

## 2021-01-01 PROCEDURE — 85610 PROTHROMBIN TIME: CPT | Performed by: INTERNAL MEDICINE

## 2021-01-01 PROCEDURE — 97110 THERAPEUTIC EXERCISES: CPT

## 2021-01-01 PROCEDURE — 88305 TISSUE EXAM BY PATHOLOGIST: CPT | Performed by: INTERNAL MEDICINE

## 2021-01-01 PROCEDURE — 0 IOPAMIDOL PER 1 ML: Performed by: INTERNAL MEDICINE

## 2021-01-01 PROCEDURE — 87493 C DIFF AMPLIFIED PROBE: CPT | Performed by: FAMILY MEDICINE

## 2021-01-01 PROCEDURE — 0DBP8ZX EXCISION OF RECTUM, VIA NATURAL OR ARTIFICIAL OPENING ENDOSCOPIC, DIAGNOSTIC: ICD-10-PCS | Performed by: INTERNAL MEDICINE

## 2021-01-01 PROCEDURE — 99223 1ST HOSP IP/OBS HIGH 75: CPT | Performed by: INTERNAL MEDICINE

## 2021-01-01 PROCEDURE — 74176 CT ABD & PELVIS W/O CONTRAST: CPT

## 2021-01-01 PROCEDURE — 25010000002 HALOPERIDOL LACTATE PER 5 MG: Performed by: NURSE PRACTITIONER

## 2021-01-01 PROCEDURE — 85027 COMPLETE CBC AUTOMATED: CPT | Performed by: PHYSICIAN ASSISTANT

## 2021-01-01 PROCEDURE — 0202U NFCT DS 22 TRGT SARS-COV-2: CPT | Performed by: EMERGENCY MEDICINE

## 2021-01-01 PROCEDURE — 82140 ASSAY OF AMMONIA: CPT | Performed by: PHYSICIAN ASSISTANT

## 2021-01-01 PROCEDURE — 92523 SPEECH SOUND LANG COMPREHEN: CPT

## 2021-01-01 PROCEDURE — 73090 X-RAY EXAM OF FOREARM: CPT

## 2021-01-01 PROCEDURE — 85610 PROTHROMBIN TIME: CPT

## 2021-01-01 PROCEDURE — 85025 COMPLETE CBC W/AUTO DIFF WBC: CPT | Performed by: INTERNAL MEDICINE

## 2021-01-01 PROCEDURE — 82330 ASSAY OF CALCIUM: CPT | Performed by: PHYSICIAN ASSISTANT

## 2021-01-01 PROCEDURE — 74018 RADEX ABDOMEN 1 VIEW: CPT

## 2021-01-01 PROCEDURE — 83735 ASSAY OF MAGNESIUM: CPT | Performed by: FAMILY MEDICINE

## 2021-01-01 PROCEDURE — 99222 1ST HOSP IP/OBS MODERATE 55: CPT | Performed by: PHYSICIAN ASSISTANT

## 2021-01-01 PROCEDURE — 86850 RBC ANTIBODY SCREEN: CPT

## 2021-01-01 PROCEDURE — 86850 RBC ANTIBODY SCREEN: CPT | Performed by: NURSE PRACTITIONER

## 2021-01-01 PROCEDURE — 70450 CT HEAD/BRAIN W/O DYE: CPT

## 2021-01-01 PROCEDURE — 97162 PT EVAL MOD COMPLEX 30 MIN: CPT

## 2021-01-01 PROCEDURE — 99239 HOSP IP/OBS DSCHRG MGMT >30: CPT | Performed by: HOSPITALIST

## 2021-01-01 PROCEDURE — 86901 BLOOD TYPING SEROLOGIC RH(D): CPT | Performed by: NURSE PRACTITIONER

## 2021-01-01 PROCEDURE — 84145 PROCALCITONIN (PCT): CPT | Performed by: EMERGENCY MEDICINE

## 2021-01-01 PROCEDURE — 83605 ASSAY OF LACTIC ACID: CPT | Performed by: PHYSICIAN ASSISTANT

## 2021-01-01 PROCEDURE — 25010000003 POTASSIUM CHLORIDE 10 MEQ/100ML SOLUTION: Performed by: FAMILY MEDICINE

## 2021-01-01 PROCEDURE — 97166 OT EVAL MOD COMPLEX 45 MIN: CPT

## 2021-01-01 PROCEDURE — 99223 1ST HOSP IP/OBS HIGH 75: CPT | Performed by: STUDENT IN AN ORGANIZED HEALTH CARE EDUCATION/TRAINING PROGRAM

## 2021-01-01 PROCEDURE — 25010000003 LIDOCAINE 1 % SOLUTION: Performed by: NURSE ANESTHETIST, CERTIFIED REGISTERED

## 2021-01-01 PROCEDURE — 85025 COMPLETE CBC W/AUTO DIFF WBC: CPT

## 2021-01-01 PROCEDURE — 83540 ASSAY OF IRON: CPT | Performed by: PHYSICIAN ASSISTANT

## 2021-01-01 PROCEDURE — 70496 CT ANGIOGRAPHY HEAD: CPT

## 2021-01-01 PROCEDURE — 99233 SBSQ HOSP IP/OBS HIGH 50: CPT | Performed by: PHYSICIAN ASSISTANT

## 2021-01-01 PROCEDURE — 74230 X-RAY XM SWLNG FUNCJ C+: CPT

## 2021-01-01 PROCEDURE — 45380 COLONOSCOPY AND BIOPSY: CPT | Performed by: INTERNAL MEDICINE

## 2021-01-01 PROCEDURE — 25010000002 VITAMIN K1 PER 1 MG: Performed by: PHYSICIAN ASSISTANT

## 2021-01-01 PROCEDURE — 99285 EMERGENCY DEPT VISIT HI MDM: CPT

## 2021-01-01 PROCEDURE — 70551 MRI BRAIN STEM W/O DYE: CPT

## 2021-01-01 PROCEDURE — 99232 SBSQ HOSP IP/OBS MODERATE 35: CPT | Performed by: NURSE PRACTITIONER

## 2021-01-01 PROCEDURE — 80061 LIPID PANEL: CPT | Performed by: NURSE PRACTITIONER

## 2021-01-01 DEVICE — SYS CLIP GI OTSC 11/6T 165CM: Type: IMPLANTABLE DEVICE | Site: RECTUM | Status: FUNCTIONAL

## 2021-01-01 RX ORDER — DIVALPROEX SODIUM 125 MG/1
125 CAPSULE, COATED PELLETS ORAL EVERY 6 HOURS SCHEDULED
Status: DISCONTINUED | OUTPATIENT
Start: 2021-01-01 | End: 2021-01-01

## 2021-01-01 RX ORDER — POLYVINYL ALCOHOL 14 MG/ML
2 SOLUTION/ DROPS OPHTHALMIC
Status: DISCONTINUED | OUTPATIENT
Start: 2021-01-01 | End: 2021-01-01 | Stop reason: HOSPADM

## 2021-01-01 RX ORDER — SODIUM CHLORIDE 0.9 % (FLUSH) 0.9 %
10 SYRINGE (ML) INJECTION AS NEEDED
Status: DISCONTINUED | OUTPATIENT
Start: 2021-01-01 | End: 2021-01-01 | Stop reason: HOSPADM

## 2021-01-01 RX ORDER — LIDOCAINE HYDROCHLORIDE 10 MG/ML
INJECTION, SOLUTION INFILTRATION; PERINEURAL AS NEEDED
Status: DISCONTINUED | OUTPATIENT
Start: 2021-01-01 | End: 2021-01-01 | Stop reason: SURG

## 2021-01-01 RX ORDER — DIVALPROEX SODIUM 500 MG/1
500 TABLET, EXTENDED RELEASE ORAL DAILY
Status: DISCONTINUED | OUTPATIENT
Start: 2021-01-01 | End: 2021-01-01

## 2021-01-01 RX ORDER — LORAZEPAM 2 MG/ML
0.5 INJECTION INTRAMUSCULAR EVERY 4 HOURS PRN
Status: DISCONTINUED | OUTPATIENT
Start: 2021-01-01 | End: 2021-01-01 | Stop reason: HOSPADM

## 2021-01-01 RX ORDER — POTASSIUM CHLORIDE 1.5 G/1.77G
40 POWDER, FOR SOLUTION ORAL AS NEEDED
Status: DISCONTINUED | OUTPATIENT
Start: 2021-01-01 | End: 2021-01-01

## 2021-01-01 RX ORDER — MIDAZOLAM HYDROCHLORIDE 1 MG/ML
0.5 INJECTION INTRAMUSCULAR; INTRAVENOUS
Status: DISCONTINUED | OUTPATIENT
Start: 2021-01-01 | End: 2021-01-01

## 2021-01-01 RX ORDER — SODIUM CHLORIDE 0.9 % (FLUSH) 0.9 %
10 SYRINGE (ML) INJECTION EVERY 12 HOURS SCHEDULED
Status: DISCONTINUED | OUTPATIENT
Start: 2021-01-01 | End: 2021-01-01

## 2021-01-01 RX ORDER — GLYCOPYRROLATE 0.2 MG/ML
0.2 INJECTION INTRAMUSCULAR; INTRAVENOUS EVERY 6 HOURS PRN
Status: DISCONTINUED | OUTPATIENT
Start: 2021-01-01 | End: 2021-01-01 | Stop reason: HOSPADM

## 2021-01-01 RX ORDER — MORPHINE SULFATE 2 MG/ML
2 INJECTION, SOLUTION INTRAMUSCULAR; INTRAVENOUS
Status: DISCONTINUED | OUTPATIENT
Start: 2021-01-01 | End: 2021-01-01

## 2021-01-01 RX ORDER — ASPIRIN 81 MG/1
81 TABLET ORAL DAILY
Status: DISCONTINUED | OUTPATIENT
Start: 2021-01-01 | End: 2021-01-01

## 2021-01-01 RX ORDER — MORPHINE SULFATE 4 MG/ML
4 INJECTION, SOLUTION INTRAMUSCULAR; INTRAVENOUS
Status: CANCELLED | OUTPATIENT
Start: 2021-01-01 | End: 2021-03-14

## 2021-01-01 RX ORDER — POLYVINYL ALCOHOL 14 MG/ML
2 SOLUTION/ DROPS OPHTHALMIC 2 TIMES DAILY
Status: DISCONTINUED | OUTPATIENT
Start: 2021-01-01 | End: 2021-01-01 | Stop reason: HOSPADM

## 2021-01-01 RX ORDER — FOLIC ACID 1 MG/1
1 TABLET ORAL DAILY
Status: DISCONTINUED | OUTPATIENT
Start: 2021-01-01 | End: 2021-01-01

## 2021-01-01 RX ORDER — NYSTATIN 100000 [USP'U]/G
POWDER TOPICAL EVERY 12 HOURS SCHEDULED
Status: DISCONTINUED | OUTPATIENT
Start: 2021-01-01 | End: 2021-01-01

## 2021-01-01 RX ORDER — ASPIRIN 81 MG/1
81 TABLET, CHEWABLE ORAL DAILY
Status: DISCONTINUED | OUTPATIENT
Start: 2021-01-01 | End: 2021-01-01

## 2021-01-01 RX ORDER — ONDANSETRON 2 MG/ML
4 INJECTION INTRAMUSCULAR; INTRAVENOUS ONCE AS NEEDED
Status: DISCONTINUED | OUTPATIENT
Start: 2021-01-01 | End: 2021-01-01 | Stop reason: HOSPADM

## 2021-01-01 RX ORDER — SODIUM CHLORIDE 9 MG/ML
125 INJECTION, SOLUTION INTRAVENOUS CONTINUOUS
Status: DISCONTINUED | OUTPATIENT
Start: 2021-01-01 | End: 2021-01-01

## 2021-01-01 RX ORDER — ALBUTEROL SULFATE 2.5 MG/3ML
2.5 SOLUTION RESPIRATORY (INHALATION) EVERY 6 HOURS PRN
Status: DISCONTINUED | OUTPATIENT
Start: 2021-01-01 | End: 2021-01-01 | Stop reason: HOSPADM

## 2021-01-01 RX ORDER — LORAZEPAM 2 MG/ML
0.5 INJECTION INTRAMUSCULAR EVERY 8 HOURS
Status: DISCONTINUED | OUTPATIENT
Start: 2021-01-01 | End: 2021-01-01 | Stop reason: HOSPADM

## 2021-01-01 RX ORDER — HYDROCORTISONE ACETATE 25 MG/1
25 SUPPOSITORY RECTAL 2 TIMES DAILY
Status: DISCONTINUED | OUTPATIENT
Start: 2021-01-01 | End: 2021-01-01

## 2021-01-01 RX ORDER — PEG-3350, SODIUM SULFATE, SODIUM CHLORIDE, POTASSIUM CHLORIDE, SODIUM ASCORBATE AND ASCORBIC ACID 7.5-2.691G
1000 KIT ORAL
Status: DISCONTINUED | OUTPATIENT
Start: 2021-01-01 | End: 2021-01-01

## 2021-01-01 RX ORDER — BISACODYL 10 MG
10 SUPPOSITORY, RECTAL RECTAL DAILY PRN
Status: DISCONTINUED | OUTPATIENT
Start: 2021-01-01 | End: 2021-01-01 | Stop reason: HOSPADM

## 2021-01-01 RX ORDER — ATORVASTATIN CALCIUM 10 MG/1
10 TABLET, FILM COATED ORAL NIGHTLY
Status: DISCONTINUED | OUTPATIENT
Start: 2021-01-01 | End: 2021-01-01

## 2021-01-01 RX ORDER — EPHEDRINE SULFATE 50 MG/ML
5 INJECTION, SOLUTION INTRAVENOUS ONCE AS NEEDED
Status: DISCONTINUED | OUTPATIENT
Start: 2021-01-01 | End: 2021-01-01

## 2021-01-01 RX ORDER — MORPHINE SULFATE 2 MG/ML
2 INJECTION, SOLUTION INTRAMUSCULAR; INTRAVENOUS EVERY 6 HOURS
Status: DISCONTINUED | OUTPATIENT
Start: 2021-01-01 | End: 2021-01-01 | Stop reason: HOSPADM

## 2021-01-01 RX ORDER — PEG-3350, SODIUM SULFATE, SODIUM CHLORIDE, POTASSIUM CHLORIDE, SODIUM ASCORBATE AND ASCORBIC ACID 7.5-2.691G
1000 KIT ORAL
Status: COMPLETED | OUTPATIENT
Start: 2021-01-01 | End: 2021-01-01

## 2021-01-01 RX ORDER — MINERAL OIL 100 G/100G
1 OIL RECTAL ONCE
Status: COMPLETED | OUTPATIENT
Start: 2021-01-01 | End: 2021-01-01

## 2021-01-01 RX ORDER — MORPHINE SULFATE 2 MG/ML
2 INJECTION, SOLUTION INTRAMUSCULAR; INTRAVENOUS
Status: DISCONTINUED | OUTPATIENT
Start: 2021-01-01 | End: 2021-01-01 | Stop reason: HOSPADM

## 2021-01-01 RX ORDER — POTASSIUM CHLORIDE 750 MG/1
40 CAPSULE, EXTENDED RELEASE ORAL AS NEEDED
Status: DISCONTINUED | OUTPATIENT
Start: 2021-01-01 | End: 2021-01-01

## 2021-01-01 RX ORDER — HYDRALAZINE HYDROCHLORIDE 20 MG/ML
10 INJECTION INTRAMUSCULAR; INTRAVENOUS EVERY 6 HOURS PRN
Status: DISCONTINUED | OUTPATIENT
Start: 2021-01-01 | End: 2021-01-01 | Stop reason: HOSPADM

## 2021-01-01 RX ORDER — HALOPERIDOL 5 MG/ML
1 INJECTION INTRAMUSCULAR EVERY 4 HOURS PRN
Status: DISCONTINUED | OUTPATIENT
Start: 2021-01-01 | End: 2021-01-01 | Stop reason: HOSPADM

## 2021-01-01 RX ORDER — SODIUM CHLORIDE 9 MG/ML
100 INJECTION, SOLUTION INTRAVENOUS CONTINUOUS
Status: DISCONTINUED | OUTPATIENT
Start: 2021-01-01 | End: 2021-01-01

## 2021-01-01 RX ORDER — PROPOFOL 10 MG/ML
VIAL (ML) INTRAVENOUS AS NEEDED
Status: DISCONTINUED | OUTPATIENT
Start: 2021-01-01 | End: 2021-01-01 | Stop reason: SURG

## 2021-01-01 RX ORDER — ACETAMINOPHEN 325 MG/1
650 TABLET ORAL EVERY 6 HOURS PRN
Status: DISCONTINUED | OUTPATIENT
Start: 2021-01-01 | End: 2021-01-01

## 2021-01-01 RX ORDER — SODIUM CHLORIDE, SODIUM LACTATE, POTASSIUM CHLORIDE, AND CALCIUM CHLORIDE .6; .31; .03; .02 G/100ML; G/100ML; G/100ML; G/100ML
20 INJECTION, SOLUTION INTRAVENOUS CONTINUOUS
Status: DISCONTINUED | OUTPATIENT
Start: 2021-01-01 | End: 2021-01-01 | Stop reason: HOSPADM

## 2021-01-01 RX ORDER — MAGNESIUM CARB/ALUMINUM HYDROX 105-160MG
296 TABLET,CHEWABLE ORAL ONCE
Status: COMPLETED | OUTPATIENT
Start: 2021-01-01 | End: 2021-01-01

## 2021-01-01 RX ORDER — PROPOFOL 10 MG/ML
VIAL (ML) INTRAVENOUS CONTINUOUS PRN
Status: DISCONTINUED | OUTPATIENT
Start: 2021-01-01 | End: 2021-01-01 | Stop reason: SURG

## 2021-01-01 RX ORDER — PRAMIPEXOLE DIHYDROCHLORIDE 0.25 MG/1
0.25 TABLET ORAL DAILY
Status: DISCONTINUED | OUTPATIENT
Start: 2021-01-01 | End: 2021-01-01

## 2021-01-01 RX ORDER — LORAZEPAM 2 MG/ML
0.25 INJECTION INTRAMUSCULAR EVERY 4 HOURS PRN
Status: DISCONTINUED | OUTPATIENT
Start: 2021-01-01 | End: 2021-01-01 | Stop reason: HOSPADM

## 2021-01-01 RX ORDER — ACETAMINOPHEN 650 MG/1
650 SUPPOSITORY RECTAL EVERY 6 HOURS PRN
Status: DISCONTINUED | OUTPATIENT
Start: 2021-01-01 | End: 2021-01-01 | Stop reason: HOSPADM

## 2021-01-01 RX ORDER — ACETAMINOPHEN 650 MG/1
650 SUPPOSITORY RECTAL EVERY 4 HOURS PRN
Status: DISCONTINUED | OUTPATIENT
Start: 2021-01-01 | End: 2021-01-01 | Stop reason: HOSPADM

## 2021-01-01 RX ORDER — SCOLOPAMINE TRANSDERMAL SYSTEM 1 MG/1
1 PATCH, EXTENDED RELEASE TRANSDERMAL
Status: DISCONTINUED | OUTPATIENT
Start: 2021-01-01 | End: 2021-01-01 | Stop reason: HOSPADM

## 2021-01-01 RX ORDER — LORAZEPAM 2 MG/ML
1 INJECTION INTRAMUSCULAR EVERY 4 HOURS PRN
Status: DISCONTINUED | OUTPATIENT
Start: 2021-01-01 | End: 2021-01-01 | Stop reason: ALTCHOICE

## 2021-01-01 RX ORDER — ONDANSETRON 2 MG/ML
4 INJECTION INTRAMUSCULAR; INTRAVENOUS ONCE AS NEEDED
Status: DISCONTINUED | OUTPATIENT
Start: 2021-01-01 | End: 2021-01-01

## 2021-01-01 RX ORDER — BUDESONIDE AND FORMOTEROL FUMARATE DIHYDRATE 160; 4.5 UG/1; UG/1
2 AEROSOL RESPIRATORY (INHALATION)
Status: DISCONTINUED | OUTPATIENT
Start: 2021-01-01 | End: 2021-01-01

## 2021-01-01 RX ORDER — FAMOTIDINE 10 MG/ML
20 INJECTION, SOLUTION INTRAVENOUS ONCE
Status: CANCELLED | OUTPATIENT
Start: 2021-01-01 | End: 2021-01-01

## 2021-01-01 RX ORDER — POTASSIUM CHLORIDE 7.45 MG/ML
10 INJECTION INTRAVENOUS
Status: DISCONTINUED | OUTPATIENT
Start: 2021-01-01 | End: 2021-01-01

## 2021-01-01 RX ORDER — ATORVASTATIN CALCIUM 40 MG/1
80 TABLET, FILM COATED ORAL NIGHTLY
Status: DISCONTINUED | OUTPATIENT
Start: 2021-01-01 | End: 2021-01-01

## 2021-01-01 RX ORDER — ESCITALOPRAM OXALATE 20 MG/1
20 TABLET ORAL DAILY
Status: DISCONTINUED | OUTPATIENT
Start: 2021-01-01 | End: 2021-01-01

## 2021-01-01 RX ORDER — QUETIAPINE FUMARATE 25 MG/1
12.5 TABLET, FILM COATED ORAL NIGHTLY
Status: DISCONTINUED | OUTPATIENT
Start: 2021-01-01 | End: 2021-01-01

## 2021-01-01 RX ORDER — FAMOTIDINE 20 MG/1
20 TABLET, FILM COATED ORAL ONCE
Status: CANCELLED | OUTPATIENT
Start: 2021-01-01 | End: 2021-01-01

## 2021-01-01 RX ORDER — KETOROLAC TROMETHAMINE 15 MG/ML
15 INJECTION, SOLUTION INTRAMUSCULAR; INTRAVENOUS EVERY 6 HOURS PRN
Status: DISCONTINUED | OUTPATIENT
Start: 2021-01-01 | End: 2021-01-01 | Stop reason: HOSPADM

## 2021-01-01 RX ORDER — POLYETHYLENE GLYCOL 3350 17 G/17G
17 POWDER, FOR SOLUTION ORAL DAILY
Status: DISCONTINUED | OUTPATIENT
Start: 2021-01-01 | End: 2021-01-01

## 2021-01-01 RX ORDER — LEVOTHYROXINE SODIUM 0.07 MG/1
75 TABLET ORAL
Status: DISCONTINUED | OUTPATIENT
Start: 2021-01-01 | End: 2021-01-01

## 2021-01-01 RX ORDER — PANTOPRAZOLE SODIUM 40 MG/10ML
40 INJECTION, POWDER, LYOPHILIZED, FOR SOLUTION INTRAVENOUS EVERY 12 HOURS SCHEDULED
Status: DISCONTINUED | OUTPATIENT
Start: 2021-01-01 | End: 2021-01-01

## 2021-01-01 RX ORDER — TOPIRAMATE 100 MG/1
100 TABLET, FILM COATED ORAL DAILY
Status: DISCONTINUED | OUTPATIENT
Start: 2021-01-01 | End: 2021-01-01

## 2021-01-01 RX ORDER — SODIUM CHLORIDE AND POTASSIUM CHLORIDE 150; 900 MG/100ML; MG/100ML
100 INJECTION, SOLUTION INTRAVENOUS CONTINUOUS
Status: ACTIVE | OUTPATIENT
Start: 2021-01-01 | End: 2021-01-01

## 2021-01-01 RX ORDER — PROPRANOLOL HCL 60 MG
60 CAPSULE, EXTENDED RELEASE 24HR ORAL DAILY
Status: DISCONTINUED | OUTPATIENT
Start: 2021-01-01 | End: 2021-01-01

## 2021-01-01 RX ORDER — LIDOCAINE HYDROCHLORIDE 10 MG/ML
0.5 INJECTION, SOLUTION EPIDURAL; INFILTRATION; INTRACAUDAL; PERINEURAL ONCE AS NEEDED
Status: CANCELLED | OUTPATIENT
Start: 2021-01-01

## 2021-01-01 RX ORDER — FUROSEMIDE 10 MG/ML
20 INJECTION INTRAMUSCULAR; INTRAVENOUS EVERY 6 HOURS PRN
Status: DISCONTINUED | OUTPATIENT
Start: 2021-01-01 | End: 2021-01-01 | Stop reason: HOSPADM

## 2021-01-01 RX ORDER — POLYVINYL ALCOHOL 14 MG/ML
1 SOLUTION/ DROPS OPHTHALMIC
Status: DISCONTINUED | OUTPATIENT
Start: 2021-01-01 | End: 2021-01-01 | Stop reason: HOSPADM

## 2021-01-01 RX ORDER — SODIUM CHLORIDE 0.9 % (FLUSH) 0.9 %
10 SYRINGE (ML) INJECTION AS NEEDED
Status: DISCONTINUED | OUTPATIENT
Start: 2021-01-01 | End: 2021-01-01

## 2021-01-01 RX ORDER — PENICILLIN V POTASSIUM 250 MG/1
500 TABLET ORAL EVERY 12 HOURS SCHEDULED
Status: DISCONTINUED | OUTPATIENT
Start: 2021-01-01 | End: 2021-01-01

## 2021-01-01 RX ORDER — DOCUSATE SODIUM 100 MG/1
100 CAPSULE, LIQUID FILLED ORAL 2 TIMES DAILY
Status: DISCONTINUED | OUTPATIENT
Start: 2021-01-01 | End: 2021-01-01

## 2021-01-01 RX ORDER — IPRATROPIUM BROMIDE AND ALBUTEROL SULFATE 2.5; .5 MG/3ML; MG/3ML
3 SOLUTION RESPIRATORY (INHALATION) ONCE AS NEEDED
Status: DISCONTINUED | OUTPATIENT
Start: 2021-01-01 | End: 2021-01-01 | Stop reason: HOSPADM

## 2021-01-01 RX ORDER — SODIUM CHLORIDE 0.9 % (FLUSH) 0.9 %
10 SYRINGE (ML) INJECTION EVERY 12 HOURS SCHEDULED
Status: CANCELLED | OUTPATIENT
Start: 2021-01-01

## 2021-01-01 RX ORDER — SODIUM CHLORIDE, SODIUM LACTATE, POTASSIUM CHLORIDE, CALCIUM CHLORIDE 600; 310; 30; 20 MG/100ML; MG/100ML; MG/100ML; MG/100ML
9 INJECTION, SOLUTION INTRAVENOUS CONTINUOUS
Status: DISCONTINUED | OUTPATIENT
Start: 2021-01-01 | End: 2021-01-01 | Stop reason: HOSPADM

## 2021-01-01 RX ORDER — MIDAZOLAM HYDROCHLORIDE 1 MG/ML
1 INJECTION INTRAMUSCULAR; INTRAVENOUS
Status: DISCONTINUED | OUTPATIENT
Start: 2021-01-01 | End: 2021-01-01

## 2021-01-01 RX ADMIN — BUDESONIDE AND FORMOTEROL FUMARATE DIHYDRATE 2 PUFF: 160; 4.5 AEROSOL RESPIRATORY (INHALATION) at 19:05

## 2021-01-01 RX ADMIN — IOPAMIDOL 100 ML: 755 INJECTION, SOLUTION INTRAVENOUS at 17:45

## 2021-01-01 RX ADMIN — FOLIC ACID 1 MG: 1 TABLET ORAL at 08:58

## 2021-01-01 RX ADMIN — SODIUM CHLORIDE, PRESERVATIVE FREE 10 ML: 5 INJECTION INTRAVENOUS at 09:29

## 2021-01-01 RX ADMIN — SODIUM CHLORIDE 125 ML/HR: 9 INJECTION, SOLUTION INTRAVENOUS at 17:39

## 2021-01-01 RX ADMIN — POTASSIUM CHLORIDE AND SODIUM CHLORIDE 100 ML/HR: 900; 150 INJECTION, SOLUTION INTRAVENOUS at 01:27

## 2021-01-01 RX ADMIN — PROPOFOL 40 MG: 10 INJECTION, EMULSION INTRAVENOUS at 11:54

## 2021-01-01 RX ADMIN — DIVALPROEX SODIUM 125 MG: 125 CAPSULE, COATED PELLETS ORAL at 23:55

## 2021-01-01 RX ADMIN — BUDESONIDE AND FORMOTEROL FUMARATE DIHYDRATE 2 PUFF: 160; 4.5 AEROSOL RESPIRATORY (INHALATION) at 09:37

## 2021-01-01 RX ADMIN — DIVALPROEX SODIUM 125 MG: 125 CAPSULE, COATED PELLETS ORAL at 17:32

## 2021-01-01 RX ADMIN — LEVOTHYROXINE SODIUM 75 MCG: 75 TABLET ORAL at 06:29

## 2021-01-01 RX ADMIN — CARBIDOPA AND LEVODOPA 2 TABLET: 25; 100 TABLET ORAL at 08:05

## 2021-01-01 RX ADMIN — BUDESONIDE AND FORMOTEROL FUMARATE DIHYDRATE 2 PUFF: 160; 4.5 AEROSOL RESPIRATORY (INHALATION) at 20:40

## 2021-01-01 RX ADMIN — SODIUM CHLORIDE, PRESERVATIVE FREE 10 ML: 5 INJECTION INTRAVENOUS at 20:09

## 2021-01-01 RX ADMIN — NYSTATIN: 100000 POWDER TOPICAL at 11:21

## 2021-01-01 RX ADMIN — MORPHINE SULFATE 2 MG: 2 INJECTION, SOLUTION INTRAMUSCULAR; INTRAVENOUS at 14:49

## 2021-01-01 RX ADMIN — PROPRANOLOL HYDROCHLORIDE 60 MG: 60 CAPSULE, EXTENDED RELEASE ORAL at 17:59

## 2021-01-01 RX ADMIN — QUETIAPINE FUMARATE 12.5 MG: 25 TABLET ORAL at 20:32

## 2021-01-01 RX ADMIN — MORPHINE SULFATE 2 MG: 2 INJECTION, SOLUTION INTRAMUSCULAR; INTRAVENOUS at 06:28

## 2021-01-01 RX ADMIN — DIVALPROEX SODIUM 125 MG: 125 CAPSULE, COATED PELLETS ORAL at 05:45

## 2021-01-01 RX ADMIN — DIVALPROEX SODIUM 125 MG: 125 CAPSULE, COATED PELLETS ORAL at 18:20

## 2021-01-01 RX ADMIN — SODIUM CHLORIDE 100 ML/HR: 9 INJECTION, SOLUTION INTRAVENOUS at 00:04

## 2021-01-01 RX ADMIN — MINERAL OIL: 1000 LIQUID ORAL at 00:42

## 2021-01-01 RX ADMIN — POLYETHYLENE GLYCOL 3350 17 G: 17 POWDER, FOR SOLUTION ORAL at 08:59

## 2021-01-01 RX ADMIN — QUETIAPINE FUMARATE 12.5 MG: 25 TABLET ORAL at 20:29

## 2021-01-01 RX ADMIN — ESCITALOPRAM OXALATE 20 MG: 20 TABLET ORAL at 08:41

## 2021-01-01 RX ADMIN — POTASSIUM CHLORIDE 10 MEQ: 7.46 INJECTION, SOLUTION INTRAVENOUS at 03:15

## 2021-01-01 RX ADMIN — QUETIAPINE FUMARATE 12.5 MG: 25 TABLET ORAL at 22:26

## 2021-01-01 RX ADMIN — TAZOBACTAM SODIUM AND PIPERACILLIN SODIUM 3.38 G: 375; 3 INJECTION, SOLUTION INTRAVENOUS at 03:40

## 2021-01-01 RX ADMIN — NYSTATIN: 100000 POWDER TOPICAL at 20:31

## 2021-01-01 RX ADMIN — TAZOBACTAM SODIUM AND PIPERACILLIN SODIUM 3.38 G: 375; 3 INJECTION, SOLUTION INTRAVENOUS at 08:25

## 2021-01-01 RX ADMIN — ATORVASTATIN CALCIUM 10 MG: 10 TABLET, FILM COATED ORAL at 22:26

## 2021-01-01 RX ADMIN — TAZOBACTAM SODIUM AND PIPERACILLIN SODIUM 3.38 G: 375; 3 INJECTION, SOLUTION INTRAVENOUS at 22:49

## 2021-01-01 RX ADMIN — TAZOBACTAM SODIUM AND PIPERACILLIN SODIUM 3.38 G: 375; 3 INJECTION, SOLUTION INTRAVENOUS at 03:24

## 2021-01-01 RX ADMIN — CARBIDOPA AND LEVODOPA 2 TABLET: 25; 100 TABLET ORAL at 08:58

## 2021-01-01 RX ADMIN — LIDOCAINE HYDROCHLORIDE 50 MG: 10 INJECTION, SOLUTION INFILTRATION; PERINEURAL at 11:54

## 2021-01-01 RX ADMIN — SODIUM CHLORIDE, POTASSIUM CHLORIDE, SODIUM LACTATE AND CALCIUM CHLORIDE 20 ML/HR: 600; 310; 30; 20 INJECTION, SOLUTION INTRAVENOUS at 14:32

## 2021-01-01 RX ADMIN — TAZOBACTAM SODIUM AND PIPERACILLIN SODIUM 3.38 G: 375; 3 INJECTION, SOLUTION INTRAVENOUS at 08:05

## 2021-01-01 RX ADMIN — PANTOPRAZOLE SODIUM 40 MG: 40 INJECTION, POWDER, FOR SOLUTION INTRAVENOUS at 20:29

## 2021-01-01 RX ADMIN — DIVALPROEX SODIUM 125 MG: 125 CAPSULE, COATED PELLETS ORAL at 01:52

## 2021-01-01 RX ADMIN — METRONIDAZOLE 500 MG: 500 INJECTION, SOLUTION INTRAVENOUS at 17:20

## 2021-01-01 RX ADMIN — FOLIC ACID 1 MG: 1 TABLET ORAL at 08:25

## 2021-01-01 RX ADMIN — ATORVASTATIN CALCIUM 10 MG: 10 TABLET, FILM COATED ORAL at 20:29

## 2021-01-01 RX ADMIN — NYSTATIN: 100000 POWDER TOPICAL at 20:36

## 2021-01-01 RX ADMIN — BUDESONIDE AND FORMOTEROL FUMARATE DIHYDRATE 2 PUFF: 160; 4.5 AEROSOL RESPIRATORY (INHALATION) at 10:26

## 2021-01-01 RX ADMIN — NYSTATIN: 100000 POWDER TOPICAL at 09:29

## 2021-01-01 RX ADMIN — POTASSIUM CHLORIDE 10 MEQ: 7.46 INJECTION, SOLUTION INTRAVENOUS at 02:21

## 2021-01-01 RX ADMIN — BUDESONIDE AND FORMOTEROL FUMARATE DIHYDRATE 2 PUFF: 160; 4.5 AEROSOL RESPIRATORY (INHALATION) at 07:57

## 2021-01-01 RX ADMIN — CARBIDOPA AND LEVODOPA 2 TABLET: 25; 100 TABLET ORAL at 20:53

## 2021-01-01 RX ADMIN — BUDESONIDE AND FORMOTEROL FUMARATE DIHYDRATE 2 PUFF: 160; 4.5 AEROSOL RESPIRATORY (INHALATION) at 07:05

## 2021-01-01 RX ADMIN — MORPHINE SULFATE 2 MG: 2 INJECTION, SOLUTION INTRAMUSCULAR; INTRAVENOUS at 17:48

## 2021-01-01 RX ADMIN — CARBIDOPA AND LEVODOPA 2 TABLET: 25; 100 TABLET ORAL at 20:29

## 2021-01-01 RX ADMIN — POTASSIUM CHLORIDE 40 MEQ: 1.5 POWDER, FOR SOLUTION ORAL at 06:29

## 2021-01-01 RX ADMIN — METRONIDAZOLE 500 MG: 500 INJECTION, SOLUTION INTRAVENOUS at 03:51

## 2021-01-01 RX ADMIN — ATORVASTATIN CALCIUM 10 MG: 10 TABLET, FILM COATED ORAL at 20:35

## 2021-01-01 RX ADMIN — PRAMIPEXOLE DIHYDROCHLORIDE 0.25 MG: 0.25 TABLET ORAL at 08:25

## 2021-01-01 RX ADMIN — TAZOBACTAM SODIUM AND PIPERACILLIN SODIUM 3.38 G: 375; 3 INJECTION, SOLUTION INTRAVENOUS at 16:09

## 2021-01-01 RX ADMIN — ACYCLOVIR SODIUM 640 MG: 500 INJECTION, SOLUTION INTRAVENOUS at 08:41

## 2021-01-01 RX ADMIN — POLYVINYL ALCOHOL 2 DROP: 14 SOLUTION/ DROPS OPHTHALMIC at 20:17

## 2021-01-01 RX ADMIN — ATORVASTATIN CALCIUM 10 MG: 10 TABLET, FILM COATED ORAL at 20:09

## 2021-01-01 RX ADMIN — BUDESONIDE AND FORMOTEROL FUMARATE DIHYDRATE 2 PUFF: 160; 4.5 AEROSOL RESPIRATORY (INHALATION) at 07:10

## 2021-01-01 RX ADMIN — TAZOBACTAM SODIUM AND PIPERACILLIN SODIUM 3.38 G: 375; 3 INJECTION, SOLUTION INTRAVENOUS at 20:09

## 2021-01-01 RX ADMIN — POLYETHYLENE GLYCOL 3350, SODIUM SULFATE ANHYDROUS, SODIUM BICARBONATE, SODIUM CHLORIDE, POTASSIUM CHLORIDE 4000 ML: 236; 22.74; 6.74; 5.86; 2.97 POWDER, FOR SOLUTION ORAL at 12:28

## 2021-01-01 RX ADMIN — KETOROLAC TROMETHAMINE 15 MG: 15 INJECTION, SOLUTION INTRAMUSCULAR; INTRAVENOUS at 14:49

## 2021-01-01 RX ADMIN — BUDESONIDE AND FORMOTEROL FUMARATE DIHYDRATE 2 PUFF: 160; 4.5 AEROSOL RESPIRATORY (INHALATION) at 08:45

## 2021-01-01 RX ADMIN — QUETIAPINE FUMARATE 12.5 MG: 25 TABLET ORAL at 20:54

## 2021-01-01 RX ADMIN — LORAZEPAM 0.5 MG: 2 INJECTION INTRAMUSCULAR; INTRAVENOUS at 08:15

## 2021-01-01 RX ADMIN — DIVALPROEX SODIUM 125 MG: 125 CAPSULE, COATED PELLETS ORAL at 23:53

## 2021-01-01 RX ADMIN — DIVALPROEX SODIUM 125 MG: 125 CAPSULE, COATED PELLETS ORAL at 16:09

## 2021-01-01 RX ADMIN — ESCITALOPRAM OXALATE 20 MG: 20 TABLET ORAL at 08:25

## 2021-01-01 RX ADMIN — SODIUM CHLORIDE, POTASSIUM CHLORIDE, SODIUM LACTATE AND CALCIUM CHLORIDE 250 ML: 600; 310; 30; 20 INJECTION, SOLUTION INTRAVENOUS at 15:30

## 2021-01-01 RX ADMIN — CARBIDOPA AND LEVODOPA 2 TABLET: 25; 100 TABLET ORAL at 08:38

## 2021-01-01 RX ADMIN — MORPHINE SULFATE 2 MG: 2 INJECTION, SOLUTION INTRAMUSCULAR; INTRAVENOUS at 16:23

## 2021-01-01 RX ADMIN — MORPHINE SULFATE 2 MG: 2 INJECTION, SOLUTION INTRAMUSCULAR; INTRAVENOUS at 23:48

## 2021-01-01 RX ADMIN — ATORVASTATIN CALCIUM 10 MG: 10 TABLET, FILM COATED ORAL at 22:23

## 2021-01-01 RX ADMIN — LORAZEPAM 1 MG: 2 INJECTION INTRAMUSCULAR; INTRAVENOUS at 13:33

## 2021-01-01 RX ADMIN — CARBIDOPA AND LEVODOPA 2 TABLET: 25; 100 TABLET ORAL at 09:04

## 2021-01-01 RX ADMIN — METRONIDAZOLE 500 MG: 500 INJECTION, SOLUTION INTRAVENOUS at 19:44

## 2021-01-01 RX ADMIN — DIVALPROEX SODIUM 125 MG: 125 CAPSULE, COATED PELLETS ORAL at 12:17

## 2021-01-01 RX ADMIN — SODIUM CHLORIDE, PRESERVATIVE FREE 10 ML: 5 INJECTION INTRAVENOUS at 20:32

## 2021-01-01 RX ADMIN — FUROSEMIDE 20 MG: 10 INJECTION, SOLUTION INTRAMUSCULAR; INTRAVENOUS at 07:41

## 2021-01-01 RX ADMIN — PANTOPRAZOLE SODIUM 40 MG: 40 INJECTION, POWDER, FOR SOLUTION INTRAVENOUS at 08:20

## 2021-01-01 RX ADMIN — ATORVASTATIN CALCIUM 10 MG: 10 TABLET, FILM COATED ORAL at 20:53

## 2021-01-01 RX ADMIN — TOPIRAMATE 100 MG: 100 TABLET, FILM COATED ORAL at 08:19

## 2021-01-01 RX ADMIN — PANTOPRAZOLE SODIUM 40 MG: 40 INJECTION, POWDER, FOR SOLUTION INTRAVENOUS at 09:29

## 2021-01-01 RX ADMIN — POLYVINYL ALCOHOL 2 DROP: 14 SOLUTION/ DROPS OPHTHALMIC at 07:45

## 2021-01-01 RX ADMIN — PROPRANOLOL HYDROCHLORIDE 30 MG: 20 TABLET ORAL at 11:20

## 2021-01-01 RX ADMIN — POTASSIUM CHLORIDE 10 MEQ: 7.46 INJECTION, SOLUTION INTRAVENOUS at 04:32

## 2021-01-01 RX ADMIN — ESCITALOPRAM OXALATE 20 MG: 20 TABLET ORAL at 08:38

## 2021-01-01 RX ADMIN — DIVALPROEX SODIUM 125 MG: 125 CAPSULE, COATED PELLETS ORAL at 05:57

## 2021-01-01 RX ADMIN — LORAZEPAM 1 MG: 2 INJECTION INTRAMUSCULAR; INTRAVENOUS at 20:09

## 2021-01-01 RX ADMIN — TAZOBACTAM SODIUM AND PIPERACILLIN SODIUM 3.38 G: 375; 3 INJECTION, SOLUTION INTRAVENOUS at 16:52

## 2021-01-01 RX ADMIN — PROPOFOL 50 MG: 10 INJECTION, EMULSION INTRAVENOUS at 15:28

## 2021-01-01 RX ADMIN — SODIUM CHLORIDE, POTASSIUM CHLORIDE, SODIUM LACTATE AND CALCIUM CHLORIDE 9 ML/HR: 600; 310; 30; 20 INJECTION, SOLUTION INTRAVENOUS at 10:24

## 2021-01-01 RX ADMIN — PROPOFOL 50 MCG/KG/MIN: 10 INJECTION, EMULSION INTRAVENOUS at 11:54

## 2021-01-01 RX ADMIN — DIVALPROEX SODIUM 125 MG: 125 CAPSULE, COATED PELLETS ORAL at 23:29

## 2021-01-01 RX ADMIN — CARBIDOPA AND LEVODOPA 2 TABLET: 25; 100 TABLET ORAL at 16:09

## 2021-01-01 RX ADMIN — TOPIRAMATE 100 MG: 100 TABLET, FILM COATED ORAL at 09:58

## 2021-01-01 RX ADMIN — PRAMIPEXOLE DIHYDROCHLORIDE 0.25 MG: 0.25 TABLET ORAL at 08:38

## 2021-01-01 RX ADMIN — TAZOBACTAM SODIUM AND PIPERACILLIN SODIUM 3.38 G: 375; 3 INJECTION, SOLUTION INTRAVENOUS at 13:54

## 2021-01-01 RX ADMIN — ACYCLOVIR SODIUM 640 MG: 500 INJECTION, SOLUTION INTRAVENOUS at 18:19

## 2021-01-01 RX ADMIN — ACYCLOVIR SODIUM 640 MG: 500 INJECTION, SOLUTION INTRAVENOUS at 02:00

## 2021-01-01 RX ADMIN — LEVOTHYROXINE SODIUM 75 MCG: 75 TABLET ORAL at 05:57

## 2021-01-01 RX ADMIN — LEVOTHYROXINE SODIUM 75 MCG: 75 TABLET ORAL at 12:27

## 2021-01-01 RX ADMIN — TAZOBACTAM SODIUM AND PIPERACILLIN SODIUM 3.38 G: 375; 3 INJECTION, SOLUTION INTRAVENOUS at 08:59

## 2021-01-01 RX ADMIN — POTASSIUM CHLORIDE 40 MEQ: 10 CAPSULE, COATED, EXTENDED RELEASE ORAL at 18:27

## 2021-01-01 RX ADMIN — FOLIC ACID 1 MG: 1 TABLET ORAL at 12:27

## 2021-01-01 RX ADMIN — PANTOPRAZOLE SODIUM 40 MG: 40 INJECTION, POWDER, FOR SOLUTION INTRAVENOUS at 09:28

## 2021-01-01 RX ADMIN — Medication 296 ML: at 09:04

## 2021-01-01 RX ADMIN — TAZOBACTAM SODIUM AND PIPERACILLIN SODIUM 3.38 G: 375; 3 INJECTION, SOLUTION INTRAVENOUS at 23:33

## 2021-01-01 RX ADMIN — CARBIDOPA AND LEVODOPA 2 TABLET: 25; 100 TABLET ORAL at 20:09

## 2021-01-01 RX ADMIN — PANTOPRAZOLE SODIUM 40 MG: 40 INJECTION, POWDER, FOR SOLUTION INTRAVENOUS at 23:33

## 2021-01-01 RX ADMIN — MINERAL OIL: 1000 LIQUID ORAL at 11:44

## 2021-01-01 RX ADMIN — DIVALPROEX SODIUM 125 MG: 125 CAPSULE, COATED PELLETS ORAL at 11:22

## 2021-01-01 RX ADMIN — TAZOBACTAM SODIUM AND PIPERACILLIN SODIUM 3.38 G: 375; 3 INJECTION, SOLUTION INTRAVENOUS at 11:38

## 2021-01-01 RX ADMIN — CARBIDOPA AND LEVODOPA 2 TABLET: 25; 100 TABLET ORAL at 08:25

## 2021-01-01 RX ADMIN — MINERAL OIL: 1000 LIQUID ORAL at 07:29

## 2021-01-01 RX ADMIN — ESCITALOPRAM OXALATE 20 MG: 20 TABLET ORAL at 09:58

## 2021-01-01 RX ADMIN — BUDESONIDE AND FORMOTEROL FUMARATE DIHYDRATE 2 PUFF: 160; 4.5 AEROSOL RESPIRATORY (INHALATION) at 20:15

## 2021-01-01 RX ADMIN — ESCITALOPRAM OXALATE 20 MG: 20 TABLET ORAL at 08:19

## 2021-01-01 RX ADMIN — LORAZEPAM 0.5 MG: 2 INJECTION INTRAMUSCULAR; INTRAVENOUS at 18:15

## 2021-01-01 RX ADMIN — PANTOPRAZOLE SODIUM 40 MG: 40 INJECTION, POWDER, FOR SOLUTION INTRAVENOUS at 20:09

## 2021-01-01 RX ADMIN — HALOPERIDOL LACTATE 1 MG: 5 INJECTION, SOLUTION INTRAMUSCULAR at 07:41

## 2021-01-01 RX ADMIN — FOLIC ACID 1 MG: 1 TABLET ORAL at 08:05

## 2021-01-01 RX ADMIN — CARBIDOPA AND LEVODOPA 2 TABLET: 25; 100 TABLET ORAL at 17:58

## 2021-01-01 RX ADMIN — FOLIC ACID 1 MG: 1 TABLET ORAL at 11:49

## 2021-01-01 RX ADMIN — DIVALPROEX SODIUM 125 MG: 125 CAPSULE, COATED PELLETS ORAL at 00:23

## 2021-01-01 RX ADMIN — CARBIDOPA AND LEVODOPA 2 TABLET: 25; 100 TABLET ORAL at 16:43

## 2021-01-01 RX ADMIN — CARBIDOPA AND LEVODOPA 2 TABLET: 25; 100 TABLET ORAL at 22:26

## 2021-01-01 RX ADMIN — CARBIDOPA AND LEVODOPA 2 TABLET: 25; 100 TABLET ORAL at 18:13

## 2021-01-01 RX ADMIN — LEVOTHYROXINE SODIUM 75 MCG: 75 TABLET ORAL at 05:45

## 2021-01-01 RX ADMIN — QUETIAPINE FUMARATE 12.5 MG: 25 TABLET ORAL at 19:57

## 2021-01-01 RX ADMIN — SODIUM CHLORIDE, PRESERVATIVE FREE 10 ML: 5 INJECTION INTRAVENOUS at 23:54

## 2021-01-01 RX ADMIN — DIVALPROEX SODIUM 125 MG: 125 CAPSULE, COATED PELLETS ORAL at 05:34

## 2021-01-01 RX ADMIN — ATORVASTATIN CALCIUM 10 MG: 10 TABLET, FILM COATED ORAL at 20:41

## 2021-01-01 RX ADMIN — CARBIDOPA AND LEVODOPA 2 TABLET: 25; 100 TABLET ORAL at 22:23

## 2021-01-01 RX ADMIN — TAZOBACTAM SODIUM AND PIPERACILLIN SODIUM 3.38 G: 375; 3 INJECTION, SOLUTION INTRAVENOUS at 20:32

## 2021-01-01 RX ADMIN — DIVALPROEX SODIUM 125 MG: 125 CAPSULE, COATED PELLETS ORAL at 00:09

## 2021-01-01 RX ADMIN — PRAMIPEXOLE DIHYDROCHLORIDE 0.25 MG: 0.25 TABLET ORAL at 08:41

## 2021-01-01 RX ADMIN — TAZOBACTAM SODIUM AND PIPERACILLIN SODIUM 3.38 G: 375; 3 INJECTION, SOLUTION INTRAVENOUS at 21:37

## 2021-01-01 RX ADMIN — BUDESONIDE AND FORMOTEROL FUMARATE DIHYDRATE 2 PUFF: 160; 4.5 AEROSOL RESPIRATORY (INHALATION) at 20:07

## 2021-01-01 RX ADMIN — PRAMIPEXOLE DIHYDROCHLORIDE 0.25 MG: 0.25 TABLET ORAL at 08:59

## 2021-01-01 RX ADMIN — MORPHINE SULFATE 2 MG: 2 INJECTION, SOLUTION INTRAMUSCULAR; INTRAVENOUS at 07:41

## 2021-01-01 RX ADMIN — TAZOBACTAM SODIUM AND PIPERACILLIN SODIUM 3.38 G: 375; 3 INJECTION, SOLUTION INTRAVENOUS at 13:33

## 2021-01-01 RX ADMIN — PANTOPRAZOLE SODIUM 40 MG: 40 INJECTION, POWDER, FOR SOLUTION INTRAVENOUS at 20:40

## 2021-01-01 RX ADMIN — ACYCLOVIR SODIUM 640 MG: 500 INJECTION, SOLUTION INTRAVENOUS at 02:19

## 2021-01-01 RX ADMIN — FOLIC ACID 1 MG: 1 TABLET ORAL at 09:58

## 2021-01-01 RX ADMIN — LORAZEPAM 1 MG: 2 INJECTION INTRAMUSCULAR; INTRAVENOUS at 02:25

## 2021-01-01 RX ADMIN — ACYCLOVIR SODIUM 640 MG: 500 INJECTION, SOLUTION INTRAVENOUS at 18:13

## 2021-01-01 RX ADMIN — HYDROCORTISONE ACETATE 25 MG: 25 SUPPOSITORY RECTAL at 08:25

## 2021-01-01 RX ADMIN — SODIUM CHLORIDE 500 ML: 9 INJECTION, SOLUTION INTRAVENOUS at 20:50

## 2021-01-01 RX ADMIN — SODIUM CHLORIDE, PRESERVATIVE FREE 10 ML: 5 INJECTION INTRAVENOUS at 08:21

## 2021-01-01 RX ADMIN — ESCITALOPRAM OXALATE 20 MG: 20 TABLET ORAL at 12:27

## 2021-01-01 RX ADMIN — POTASSIUM CHLORIDE 40 MEQ: 1.5 POWDER, FOR SOLUTION ORAL at 11:59

## 2021-01-01 RX ADMIN — POTASSIUM CHLORIDE 40 MEQ: 1.5 POWDER, FOR SOLUTION ORAL at 02:10

## 2021-01-01 RX ADMIN — CARBIDOPA AND LEVODOPA 2 TABLET: 25; 100 TABLET ORAL at 16:52

## 2021-01-01 RX ADMIN — SCOPALAMINE 1 PATCH: 1 PATCH, EXTENDED RELEASE TRANSDERMAL at 23:52

## 2021-01-01 RX ADMIN — TAZOBACTAM SODIUM AND PIPERACILLIN SODIUM 3.38 G: 375; 3 INJECTION, SOLUTION INTRAVENOUS at 08:48

## 2021-01-01 RX ADMIN — CEFTRIAXONE 1 G: 1 INJECTION, POWDER, FOR SOLUTION INTRAMUSCULAR; INTRAVENOUS at 19:44

## 2021-01-01 RX ADMIN — LORAZEPAM 1 MG: 2 INJECTION INTRAMUSCULAR; INTRAVENOUS at 04:22

## 2021-01-01 RX ADMIN — LORAZEPAM 1 MG: 2 INJECTION INTRAMUSCULAR; INTRAVENOUS at 17:10

## 2021-01-01 RX ADMIN — METRONIDAZOLE 500 MG: 500 INJECTION, SOLUTION INTRAVENOUS at 09:04

## 2021-01-01 RX ADMIN — ESCITALOPRAM OXALATE 20 MG: 20 TABLET ORAL at 08:05

## 2021-01-01 RX ADMIN — MINERAL OIL: 1000 LIQUID ORAL at 05:21

## 2021-01-01 RX ADMIN — CARBIDOPA AND LEVODOPA 2 TABLET: 25; 100 TABLET ORAL at 15:32

## 2021-01-01 RX ADMIN — DIVALPROEX SODIUM 125 MG: 125 CAPSULE, COATED PELLETS ORAL at 05:56

## 2021-01-01 RX ADMIN — TAZOBACTAM SODIUM AND PIPERACILLIN SODIUM 3.38 G: 375; 3 INJECTION, SOLUTION INTRAVENOUS at 03:44

## 2021-01-01 RX ADMIN — MORPHINE SULFATE 2 MG: 2 INJECTION, SOLUTION INTRAMUSCULAR; INTRAVENOUS at 11:43

## 2021-01-01 RX ADMIN — LEVOTHYROXINE SODIUM 75 MCG: 75 TABLET ORAL at 05:56

## 2021-01-01 RX ADMIN — NYSTATIN: 100000 POWDER TOPICAL at 08:25

## 2021-01-01 RX ADMIN — ESCITALOPRAM OXALATE 20 MG: 20 TABLET ORAL at 08:58

## 2021-01-01 RX ADMIN — SODIUM CHLORIDE, PRESERVATIVE FREE 10 ML: 5 INJECTION INTRAVENOUS at 20:11

## 2021-01-01 RX ADMIN — POLYVINYL ALCOHOL 2 DROP: 14 SOLUTION/ DROPS OPHTHALMIC at 20:29

## 2021-01-01 RX ADMIN — POLYETHYLENE GLYCOL 3350 17 G: 17 POWDER, FOR SOLUTION ORAL at 08:25

## 2021-01-01 RX ADMIN — CARBIDOPA AND LEVODOPA 2 TABLET: 25; 100 TABLET ORAL at 20:41

## 2021-01-01 RX ADMIN — PANTOPRAZOLE SODIUM 40 MG: 40 INJECTION, POWDER, FOR SOLUTION INTRAVENOUS at 09:58

## 2021-01-01 RX ADMIN — DIVALPROEX SODIUM 125 MG: 125 CAPSULE, COATED PELLETS ORAL at 17:27

## 2021-01-01 RX ADMIN — TOPIRAMATE 100 MG: 100 TABLET, FILM COATED ORAL at 09:04

## 2021-01-01 RX ADMIN — CARBIDOPA AND LEVODOPA 2 TABLET: 25; 100 TABLET ORAL at 18:20

## 2021-01-01 RX ADMIN — SODIUM CHLORIDE 2 G: 900 INJECTION INTRAVENOUS at 20:39

## 2021-01-01 RX ADMIN — TOPIRAMATE 100 MG: 100 TABLET, FILM COATED ORAL at 08:58

## 2021-01-01 RX ADMIN — PROPOFOL 100 MCG/KG/MIN: 10 INJECTION, EMULSION INTRAVENOUS at 15:28

## 2021-01-01 RX ADMIN — PRAMIPEXOLE DIHYDROCHLORIDE 0.25 MG: 0.25 TABLET ORAL at 08:19

## 2021-01-01 RX ADMIN — DIVALPROEX SODIUM 125 MG: 125 CAPSULE, COATED PELLETS ORAL at 06:32

## 2021-01-01 RX ADMIN — KETOROLAC TROMETHAMINE 15 MG: 15 INJECTION, SOLUTION INTRAMUSCULAR; INTRAVENOUS at 16:23

## 2021-01-01 RX ADMIN — ATORVASTATIN CALCIUM 10 MG: 10 TABLET, FILM COATED ORAL at 20:32

## 2021-01-01 RX ADMIN — MINERAL OIL: 1000 LIQUID ORAL at 06:28

## 2021-01-01 RX ADMIN — PANTOPRAZOLE SODIUM 40 MG: 40 INJECTION, POWDER, FOR SOLUTION INTRAVENOUS at 20:36

## 2021-01-01 RX ADMIN — PANTOPRAZOLE SODIUM 40 MG: 40 INJECTION, POWDER, FOR SOLUTION INTRAVENOUS at 19:56

## 2021-01-01 RX ADMIN — PANTOPRAZOLE SODIUM 40 MG: 40 INJECTION, POWDER, FOR SOLUTION INTRAVENOUS at 08:41

## 2021-01-01 RX ADMIN — MORPHINE SULFATE 2 MG: 2 INJECTION, SOLUTION INTRAMUSCULAR; INTRAVENOUS at 22:04

## 2021-01-01 RX ADMIN — LEVOTHYROXINE SODIUM 75 MCG: 75 TABLET ORAL at 06:05

## 2021-01-01 RX ADMIN — GLYCOPYRROLATE 0.2 MG: 0.2 INJECTION INTRAMUSCULAR; INTRAVENOUS at 23:52

## 2021-01-01 RX ADMIN — MINERAL OIL: 1000 LIQUID ORAL at 17:48

## 2021-01-01 RX ADMIN — FOLIC ACID 1 MG: 1 TABLET ORAL at 08:38

## 2021-01-01 RX ADMIN — SODIUM CHLORIDE, PRESERVATIVE FREE 10 ML: 5 INJECTION INTRAVENOUS at 09:59

## 2021-01-01 RX ADMIN — ESCITALOPRAM OXALATE 20 MG: 20 TABLET ORAL at 09:04

## 2021-01-01 RX ADMIN — POTASSIUM CHLORIDE 10 MEQ: 7.46 INJECTION, SOLUTION INTRAVENOUS at 01:07

## 2021-01-01 RX ADMIN — PANTOPRAZOLE SODIUM 40 MG: 40 INJECTION, POWDER, FOR SOLUTION INTRAVENOUS at 05:47

## 2021-01-01 RX ADMIN — CARBIDOPA AND LEVODOPA 2 TABLET: 25; 100 TABLET ORAL at 12:27

## 2021-01-01 RX ADMIN — DIVALPROEX SODIUM 125 MG: 125 CAPSULE, COATED PELLETS ORAL at 02:10

## 2021-01-01 RX ADMIN — TAZOBACTAM SODIUM AND PIPERACILLIN SODIUM 3.38 G: 375; 3 INJECTION, SOLUTION INTRAVENOUS at 06:05

## 2021-01-01 RX ADMIN — POTASSIUM CHLORIDE 10 MEQ: 7.46 INJECTION, SOLUTION INTRAVENOUS at 00:04

## 2021-01-01 RX ADMIN — BUDESONIDE AND FORMOTEROL FUMARATE DIHYDRATE 2 PUFF: 160; 4.5 AEROSOL RESPIRATORY (INHALATION) at 20:20

## 2021-01-01 RX ADMIN — LORAZEPAM 1 MG: 2 INJECTION INTRAMUSCULAR; INTRAVENOUS at 08:38

## 2021-01-01 RX ADMIN — SODIUM CHLORIDE 500 ML: 9 INJECTION, SOLUTION INTRAVENOUS at 17:39

## 2021-01-01 RX ADMIN — SODIUM CHLORIDE 500 ML: 9 INJECTION, SOLUTION INTRAVENOUS at 01:08

## 2021-01-01 RX ADMIN — HYDROCORTISONE ACETATE 25 MG: 25 SUPPOSITORY RECTAL at 20:44

## 2021-01-01 RX ADMIN — DIVALPROEX SODIUM 125 MG: 125 CAPSULE, COATED PELLETS ORAL at 13:22

## 2021-01-01 RX ADMIN — TAZOBACTAM SODIUM AND PIPERACILLIN SODIUM 3.38 G: 375; 3 INJECTION, SOLUTION INTRAVENOUS at 13:22

## 2021-01-01 RX ADMIN — PANTOPRAZOLE SODIUM 40 MG: 40 INJECTION, POWDER, FOR SOLUTION INTRAVENOUS at 22:27

## 2021-01-01 RX ADMIN — CARBIDOPA AND LEVODOPA 2 TABLET: 25; 100 TABLET ORAL at 08:19

## 2021-01-01 RX ADMIN — LORAZEPAM 0.5 MG: 2 INJECTION INTRAMUSCULAR; INTRAVENOUS at 03:59

## 2021-01-01 RX ADMIN — PRAMIPEXOLE DIHYDROCHLORIDE 0.25 MG: 0.25 TABLET ORAL at 12:27

## 2021-01-01 RX ADMIN — BARIUM SULFATE 55 ML: 0.81 POWDER, FOR SUSPENSION ORAL at 13:10

## 2021-01-01 RX ADMIN — CARBIDOPA AND LEVODOPA 2 TABLET: 25; 100 TABLET ORAL at 09:58

## 2021-01-01 RX ADMIN — PANTOPRAZOLE SODIUM 40 MG: 40 INJECTION, POWDER, FOR SOLUTION INTRAVENOUS at 08:05

## 2021-01-01 RX ADMIN — LORAZEPAM 1 MG: 2 INJECTION INTRAMUSCULAR; INTRAVENOUS at 23:12

## 2021-01-01 RX ADMIN — FOLIC ACID 1 MG: 1 TABLET ORAL at 08:19

## 2021-01-01 RX ADMIN — MORPHINE SULFATE 2 MG: 2 INJECTION, SOLUTION INTRAMUSCULAR; INTRAVENOUS at 07:29

## 2021-01-01 RX ADMIN — DIVALPROEX SODIUM 125 MG: 125 CAPSULE, COATED PELLETS ORAL at 12:14

## 2021-01-01 RX ADMIN — ACETAMINOPHEN 650 MG: 325 TABLET ORAL at 13:33

## 2021-01-01 RX ADMIN — DIVALPROEX SODIUM 125 MG: 125 CAPSULE, COATED PELLETS ORAL at 23:10

## 2021-01-01 RX ADMIN — MINERAL OIL 1 ENEMA: 100 ENEMA RECTAL at 11:49

## 2021-01-01 RX ADMIN — SODIUM CHLORIDE, PRESERVATIVE FREE 10 ML: 5 INJECTION INTRAVENOUS at 20:36

## 2021-01-01 RX ADMIN — DIVALPROEX SODIUM 125 MG: 125 CAPSULE, COATED PELLETS ORAL at 11:38

## 2021-01-01 RX ADMIN — NYSTATIN: 100000 POWDER TOPICAL at 20:29

## 2021-01-01 RX ADMIN — MORPHINE SULFATE 2 MG: 2 INJECTION, SOLUTION INTRAMUSCULAR; INTRAVENOUS at 20:29

## 2021-01-01 RX ADMIN — TAZOBACTAM SODIUM AND PIPERACILLIN SODIUM 3.38 G: 375; 3 INJECTION, SOLUTION INTRAVENOUS at 08:38

## 2021-01-01 RX ADMIN — TAZOBACTAM SODIUM AND PIPERACILLIN SODIUM 3.38 G: 375; 3 INJECTION, SOLUTION INTRAVENOUS at 01:04

## 2021-01-01 RX ADMIN — TAZOBACTAM SODIUM AND PIPERACILLIN SODIUM 3.38 G: 375; 3 INJECTION, SOLUTION INTRAVENOUS at 09:28

## 2021-01-01 RX ADMIN — PANTOPRAZOLE SODIUM 40 MG: 40 INJECTION, POWDER, FOR SOLUTION INTRAVENOUS at 08:38

## 2021-01-01 RX ADMIN — SODIUM CHLORIDE, PRESERVATIVE FREE 10 ML: 5 INJECTION INTRAVENOUS at 10:24

## 2021-01-01 RX ADMIN — CARBIDOPA AND LEVODOPA 2 TABLET: 25; 100 TABLET ORAL at 20:32

## 2021-01-01 RX ADMIN — TAZOBACTAM SODIUM AND PIPERACILLIN SODIUM 3.38 G: 375; 3 INJECTION, SOLUTION INTRAVENOUS at 19:55

## 2021-01-01 RX ADMIN — POTASSIUM CHLORIDE 40 MEQ: 10 CAPSULE, COATED, EXTENDED RELEASE ORAL at 09:58

## 2021-01-01 RX ADMIN — BUDESONIDE AND FORMOTEROL FUMARATE DIHYDRATE 2 PUFF: 160; 4.5 AEROSOL RESPIRATORY (INHALATION) at 19:37

## 2021-01-01 RX ADMIN — MORPHINE SULFATE 2 MG: 2 INJECTION, SOLUTION INTRAMUSCULAR; INTRAVENOUS at 05:20

## 2021-01-01 RX ADMIN — POTASSIUM CHLORIDE 40 MEQ: 10 CAPSULE, COATED, EXTENDED RELEASE ORAL at 14:10

## 2021-01-01 RX ADMIN — POLYETHYLENE GLYCOL 3350, SODIUM SULFATE, SODIUM CHLORIDE, POTASSIUM CHLORIDE, ASCORBIC ACID, SODIUM ASCORBATE 1000 ML: KIT at 17:21

## 2021-01-01 RX ADMIN — TOPIRAMATE 100 MG: 100 TABLET, FILM COATED ORAL at 08:41

## 2021-01-01 RX ADMIN — SODIUM CHLORIDE, PRESERVATIVE FREE 10 ML: 5 INJECTION INTRAVENOUS at 20:41

## 2021-01-01 RX ADMIN — DIVALPROEX SODIUM 125 MG: 125 CAPSULE, COATED PELLETS ORAL at 08:04

## 2021-01-01 RX ADMIN — MINERAL OIL: 1000 LIQUID ORAL at 18:09

## 2021-01-01 RX ADMIN — TOPIRAMATE 100 MG: 100 TABLET, FILM COATED ORAL at 08:38

## 2021-01-01 RX ADMIN — PANTOPRAZOLE SODIUM 40 MG: 40 INJECTION, POWDER, FOR SOLUTION INTRAVENOUS at 08:25

## 2021-01-01 RX ADMIN — SODIUM CHLORIDE, PRESERVATIVE FREE 10 ML: 5 INJECTION INTRAVENOUS at 22:49

## 2021-01-01 RX ADMIN — POLYETHYLENE GLYCOL 3350 17 G: 17 POWDER, FOR SOLUTION ORAL at 09:59

## 2021-01-01 RX ADMIN — SODIUM CHLORIDE, PRESERVATIVE FREE 10 ML: 5 INJECTION INTRAVENOUS at 08:26

## 2021-01-01 RX ADMIN — PANTOPRAZOLE SODIUM 40 MG: 40 INJECTION, POWDER, FOR SOLUTION INTRAVENOUS at 22:23

## 2021-01-01 RX ADMIN — TAZOBACTAM SODIUM AND PIPERACILLIN SODIUM 3.38 G: 375; 3 INJECTION, SOLUTION INTRAVENOUS at 13:57

## 2021-01-01 RX ADMIN — LORAZEPAM 0.5 MG: 2 INJECTION INTRAMUSCULAR; INTRAVENOUS at 11:43

## 2021-01-01 RX ADMIN — QUETIAPINE FUMARATE 12.5 MG: 25 TABLET ORAL at 20:36

## 2021-01-01 RX ADMIN — SODIUM CHLORIDE, POTASSIUM CHLORIDE, SODIUM LACTATE AND CALCIUM CHLORIDE 250 ML: 600; 310; 30; 20 INJECTION, SOLUTION INTRAVENOUS at 15:48

## 2021-01-01 RX ADMIN — DIVALPROEX SODIUM 500 MG: 500 TABLET, EXTENDED RELEASE ORAL at 09:04

## 2021-01-01 RX ADMIN — ATORVASTATIN CALCIUM 10 MG: 10 TABLET, FILM COATED ORAL at 19:57

## 2021-01-01 RX ADMIN — DIVALPROEX SODIUM 125 MG: 125 CAPSULE, COATED PELLETS ORAL at 06:05

## 2021-01-01 RX ADMIN — PENICILLIN V POTASIUM 500 MG: 250 TABLET ORAL at 09:53

## 2021-01-01 RX ADMIN — IOPAMIDOL 115 ML: 755 INJECTION, SOLUTION INTRAVENOUS at 04:32

## 2021-01-01 RX ADMIN — TAZOBACTAM SODIUM AND PIPERACILLIN SODIUM 3.38 G: 375; 3 INJECTION, SOLUTION INTRAVENOUS at 05:25

## 2021-01-01 RX ADMIN — MINERAL OIL: 1000 LIQUID ORAL at 23:48

## 2021-01-01 RX ADMIN — LEVOTHYROXINE SODIUM 75 MCG: 75 TABLET ORAL at 05:34

## 2021-01-01 RX ADMIN — FOLIC ACID 1 MG: 1 TABLET ORAL at 08:41

## 2021-01-01 RX ADMIN — HALOPERIDOL LACTATE 1 MG: 5 INJECTION, SOLUTION INTRAMUSCULAR at 14:49

## 2021-01-01 RX ADMIN — SODIUM CHLORIDE, PRESERVATIVE FREE 10 ML: 5 INJECTION INTRAVENOUS at 08:58

## 2021-01-01 RX ADMIN — LEVOTHYROXINE SODIUM 75 MCG: 75 TABLET ORAL at 06:32

## 2021-01-01 RX ADMIN — BUDESONIDE AND FORMOTEROL FUMARATE DIHYDRATE 2 PUFF: 160; 4.5 AEROSOL RESPIRATORY (INHALATION) at 19:03

## 2021-01-01 RX ADMIN — SODIUM CHLORIDE, POTASSIUM CHLORIDE, SODIUM LACTATE AND CALCIUM CHLORIDE: 600; 310; 30; 20 INJECTION, SOLUTION INTRAVENOUS at 11:48

## 2021-01-01 RX ADMIN — POTASSIUM CHLORIDE AND SODIUM CHLORIDE 100 ML/HR: 900; 150 INJECTION, SOLUTION INTRAVENOUS at 11:59

## 2021-01-01 RX ADMIN — PRAMIPEXOLE DIHYDROCHLORIDE 0.25 MG: 0.25 TABLET ORAL at 09:04

## 2021-01-01 RX ADMIN — LORAZEPAM 0.5 MG: 2 INJECTION INTRAMUSCULAR; INTRAVENOUS at 20:18

## 2021-01-01 RX ADMIN — BUDESONIDE AND FORMOTEROL FUMARATE DIHYDRATE 2 PUFF: 160; 4.5 AEROSOL RESPIRATORY (INHALATION) at 07:50

## 2021-01-01 RX ADMIN — NYSTATIN: 100000 POWDER TOPICAL at 09:58

## 2021-01-01 RX ADMIN — CARBIDOPA AND LEVODOPA 2 TABLET: 25; 100 TABLET ORAL at 08:41

## 2021-01-01 RX ADMIN — CARBIDOPA AND LEVODOPA 2 TABLET: 25; 100 TABLET ORAL at 15:19

## 2021-01-01 RX ADMIN — TAZOBACTAM SODIUM AND PIPERACILLIN SODIUM 3.38 G: 375; 3 INJECTION, SOLUTION INTRAVENOUS at 00:41

## 2021-01-01 RX ADMIN — MORPHINE SULFATE 2 MG: 2 INJECTION, SOLUTION INTRAMUSCULAR; INTRAVENOUS at 18:15

## 2021-01-01 RX ADMIN — DIVALPROEX SODIUM 125 MG: 125 CAPSULE, COATED PELLETS ORAL at 17:58

## 2021-01-01 RX ADMIN — TOPIRAMATE 100 MG: 100 TABLET, FILM COATED ORAL at 08:25

## 2021-01-01 RX ADMIN — DIVALPROEX SODIUM 125 MG: 125 CAPSULE, COATED PELLETS ORAL at 17:01

## 2021-01-01 RX ADMIN — POLYETHYLENE GLYCOL 3350 17 G: 17 POWDER, FOR SOLUTION ORAL at 08:44

## 2021-01-01 RX ADMIN — CARBIDOPA AND LEVODOPA 2 TABLET: 25; 100 TABLET ORAL at 19:58

## 2021-01-01 RX ADMIN — POLYETHYLENE GLYCOL 3350 17 G: 17 POWDER, FOR SOLUTION ORAL at 08:04

## 2021-01-01 RX ADMIN — BUDESONIDE AND FORMOTEROL FUMARATE DIHYDRATE 2 PUFF: 160; 4.5 AEROSOL RESPIRATORY (INHALATION) at 09:40

## 2021-01-01 RX ADMIN — METRONIDAZOLE 500 MG: 500 INJECTION, SOLUTION INTRAVENOUS at 00:12

## 2021-01-01 RX ADMIN — PENICILLIN V POTASIUM 500 MG: 250 TABLET ORAL at 20:34

## 2021-01-01 RX ADMIN — POLYVINYL ALCOHOL 2 DROP: 14 SOLUTION/ DROPS OPHTHALMIC at 08:15

## 2021-01-01 RX ADMIN — TOPIRAMATE 100 MG: 100 TABLET, FILM COATED ORAL at 12:26

## 2021-01-01 RX ADMIN — PRAMIPEXOLE DIHYDROCHLORIDE 0.25 MG: 0.25 TABLET ORAL at 09:58

## 2021-01-01 RX ADMIN — POTASSIUM CHLORIDE 10 MEQ: 7.46 INJECTION, SOLUTION INTRAVENOUS at 05:42

## 2021-01-01 RX ADMIN — PHYTONADIONE 10 MG: 10 INJECTION, EMULSION INTRAMUSCULAR; INTRAVENOUS; SUBCUTANEOUS at 11:49

## 2021-01-01 RX ADMIN — PANTOPRAZOLE SODIUM 40 MG: 40 INJECTION, POWDER, FOR SOLUTION INTRAVENOUS at 08:59

## 2021-01-01 RX ADMIN — DIVALPROEX SODIUM 125 MG: 125 CAPSULE, COATED PELLETS ORAL at 13:54

## 2021-01-01 RX ADMIN — TOPIRAMATE 100 MG: 100 TABLET, FILM COATED ORAL at 08:05

## 2021-01-01 RX ADMIN — BUDESONIDE AND FORMOTEROL FUMARATE DIHYDRATE 2 PUFF: 160; 4.5 AEROSOL RESPIRATORY (INHALATION) at 09:24

## 2021-01-01 RX ADMIN — DIVALPROEX SODIUM 125 MG: 125 CAPSULE, COATED PELLETS ORAL at 19:56

## 2021-01-01 RX ADMIN — SODIUM CHLORIDE, PRESERVATIVE FREE 10 ML: 5 INJECTION INTRAVENOUS at 20:29

## 2021-01-01 RX ADMIN — HALOPERIDOL LACTATE 1 MG: 5 INJECTION, SOLUTION INTRAMUSCULAR at 07:29

## 2021-01-01 RX ADMIN — BUDESONIDE AND FORMOTEROL FUMARATE DIHYDRATE 2 PUFF: 160; 4.5 AEROSOL RESPIRATORY (INHALATION) at 19:11

## 2021-01-01 RX ADMIN — CARBIDOPA AND LEVODOPA 2 TABLET: 25; 100 TABLET ORAL at 20:36

## 2021-01-01 RX ADMIN — TAZOBACTAM SODIUM AND PIPERACILLIN SODIUM 3.38 G: 375; 3 INJECTION, SOLUTION INTRAVENOUS at 17:58

## 2021-01-01 RX ADMIN — PRAMIPEXOLE DIHYDROCHLORIDE 0.25 MG: 0.25 TABLET ORAL at 08:05

## 2021-02-21 PROBLEM — K52.9 COLITIS: Status: ACTIVE | Noted: 2021-01-01

## 2021-02-22 PROBLEM — K92.2 ACUTE LOWER GI BLEEDING: Status: ACTIVE | Noted: 2021-01-01

## 2021-02-24 NOTE — ANESTHESIA POSTPROCEDURE EVALUATION
Patient: Amador White    Procedure Summary     Date: 02/24/21 Room / [facility]:  [facility]    Anesthesia Start: 1148 Anesthesia Stop: 1231    Procedures:       COLONOSCOPY (N/A )      COLONOSCOPY (N/A ) Diagnosis:       Acute lower GI bleeding      (Acute lower GI bleeding [K92.2])    Surgeon: Brunner, Mark I, MD; Jay Reynolds MD Provider: Napoleon Pemberton MD    Anesthesia Type: general, MAC ASA Status: 3          Anesthesia Type: general, MAC    Vitals  Vitals Value Taken Time   /45 02/24/21 1230   Temp 97.3 °F (36.3 °C) 02/24/21 1230   Pulse 74 02/24/21 1230   Resp 18 02/24/21 1230   SpO2 100 % 02/24/21 1230           Post Anesthesia Care and Evaluation    Patient location during evaluation: PACU  Patient participation: complete - patient participated  Level of consciousness: awake and alert  Pain management: adequate  Airway patency: patent  Anesthetic complications: No anesthetic complications  PONV Status: none  Cardiovascular status: hemodynamically stable and acceptable  Respiratory status: nonlabored ventilation, acceptable and nasal cannula  Hydration status: acceptable

## 2021-02-24 NOTE — ANESTHESIA PREPROCEDURE EVALUATION
Anesthesia Evaluation     Patient summary reviewed and Nursing notes reviewed   NPO Solid Status: > 8 hours  NPO Liquid Status: > 8 hours           Airway   Mallampati: I  TM distance: >3 FB  Neck ROM: full  No difficulty expected  Dental    (+) poor dentition    Pulmonary    (+) COPD, asthma,  Cardiovascular     ECG reviewed    (+) hypertension, CHF , PVD (aaa data deficit), hyperlipidemia,     ROS comment: Sinus rhythm with 1st degree AV block  Left axis deviation  Left ventricular hypertrophy with QRS widening  T wave abnormality, consider lateral ischemia    Neuro/Psych  (+) headaches, tremors, psychiatric history, dementia, poor historian.,       ROS Comment: parkinsons bipolar   GI/Hepatic/Renal/Endo    (+)  GI bleeding , thyroid problem hypothyroidism    Musculoskeletal     Abdominal    Substance History      OB/GYN          Other   arthritis, blood dyscrasia anemia,     ROS/Med Hx Other: HCT 25                 Anesthesia Plan    ASA 3     general and MAC     intravenous induction     Anesthetic plan, all risks, benefits, and alternatives have been provided, discussed and informed consent has been obtained with: patient.    Plan discussed with CRNA.

## 2021-03-01 NOTE — ANESTHESIA PREPROCEDURE EVALUATION
Anesthesia Evaluation     Patient summary reviewed and Nursing notes reviewed                Airway   Mallampati: II  TM distance: >3 FB  Neck ROM: full  No difficulty expected  Dental - normal exam     Pulmonary - normal exam   (+) COPD, asthma,  Cardiovascular - normal exam    (+) hypertension, hyperlipidemia,     ROS comment:   Incomplete RBBB    Neuro/Psych  (+) dementia,     GI/Hepatic/Renal/Endo    (+)  GI bleeding , thyroid problem hypothyroidism    Musculoskeletal     Abdominal  - normal exam    Bowel sounds: normal.   Substance History - negative use     OB/GYN negative ob/gyn ROS         Other   arthritis,                      Anesthesia Plan    ASA 3     general   (Propofol)  intravenous induction     Anesthetic plan, all risks, benefits, and alternatives have been provided, discussed and informed consent has been obtained with: patient.    Plan discussed with CRNA.

## 2021-03-01 NOTE — SIGNIFICANT NOTE
02/28/21  22:25 EST  Pt with 3 large bloody stools. Vitals stable. /50 and HR was 70, 95% of RA. hgb at 9 pm was 8.6. will repeat H/h every 8 hours and transfuse if drops below 8 or becomes unstable. Will reconsult GI in the AM. Will monitor H/h Q4H.   Electronically signed by Kell Sheldon DO, 02/28/21, 10:30 PM EST.

## 2021-03-01 NOTE — ANESTHESIA POSTPROCEDURE EVALUATION
Patient: Amador White    Procedure Summary     Date: 03/01/21 Room / Location:  [facility]    Anesthesia Start: 1525 Anesthesia Stop:     Procedure: SIGMOIDOSCOPY FLEXIBLE (N/A ) Diagnosis:       Acute lower GI bleeding      (Acute lower GI bleeding [K92.2])    Surgeon: Brunner, Mark I, MD Provider: Carlos Yen MD    Anesthesia Type: general ASA Status: 3          Anesthesia Type: general    Vitals  No vitals data found for the desired time range.          Post Anesthesia Care and Evaluation    Patient location during evaluation: PACU  Patient participation: complete - patient participated  Level of consciousness: awake and alert  Pain management: adequate  Airway patency: patent  Anesthetic complications: No anesthetic complications  PONV Status: none  Cardiovascular status: hemodynamically stable and acceptable  Respiratory status: nonlabored ventilation, acceptable and nasal cannula  Hydration status: acceptable

## 2021-03-01 NOTE — PROGRESS NOTES
"    [facility] Medicine Services  PROGRESS NOTE    Patient Name: Amador White  : 1935  MRN: [id number]    Date of Admission: 2021  Primary Care Physician: Mandy Shahid MD    Subjective   Subjective     CC: f/u GIB    HPI: Wife at bedside. Had 3 more episodes of BRBPR overnight. No pain but complains that \"blood is just running out of him.\"    ROS:  UTO full ROS due to AMS.    Objective   Objective     Vital Signs:   Temp:  [96.6 °F (35.9 °C)-98.8 °F (37.1 °C)] 96.6 °F (35.9 °C)  Heart Rate:  [63-97] 66  Resp:  [16-18] 18  BP: ()/(30-93) 151/61        Physical Exam:  Constitutional: No acute distress, awake, alert  HENT: NCAT, mucous membranes moist  Respiratory: Clear to auscultation bilaterally, respiratory effort normal   Cardiovascular: RRR, no murmurs, rubs, or gallops  Gastrointestinal: Positive bowel sounds, soft, nontender, nondistended  Musculoskeletal: No bilateral ankle edema  Psychiatric: Appropriate affect, cooperative  Neurologic: Masked facies, interactive, moves extremities.  Skin: No rashes    Results Reviewed:  Results from last 7 days   Lab Units 21  0749 21  0151 21  2102 21  0449 21  0444   WBC 10*3/mm3  --   --  9.40 5.88 5.16   HEMOGLOBIN g/dL 9.8* 7.5* 8.6* 8.4* 8.2*   HEMATOCRIT % 33.5* 26.3* 28.3* 28.7* 27.4*   PLATELETS 10*3/mm3  --   --  148 131* 112*   INR   --   --   --  1.19*  --      Results from last 7 days   Lab Units 21  0444 21  0757 21  2241 21  0842  21  0747   SODIUM mmol/L 144 149*  --  148*   < > 149*   POTASSIUM mmol/L 3.8 4.3 3.3* 3.1*   < > 3.9   CHLORIDE mmol/L 112* 116*  --  113*   < > 118*   CO2 mmol/L 28.0 27.0  --  28.0   < > 25.0   BUN mg/dL 10 14  --  19   < > 25*   CREATININE mg/dL 0.44* 0.46*  --  0.54*   < > 0.49*   GLUCOSE mg/dL 82 85  --  69   < > 83   CALCIUM mg/dL 8.0* 8.0*  --  8.1*   < > 7.7*   ALT (SGPT) U/L  --   --   --   --   --  <5   AST (SGOT) " U/L  --   --   --   --   --  11    < > = values in this interval not displayed.     Estimated Creatinine Clearance: 65.3 mL/min (A) (by C-G formula based on SCr of 0.44 mg/dL (L)).    Microbiology Results Abnormal     Procedure Component Value - Date/Time    Blood Culture - Blood, Hand, Left [[id number]] Collected: 02/22/21 0321    Lab Status: Final result Specimen: Blood from Hand, Left Updated: 02/27/21 0400     Blood Culture No growth at 5 days      Aerobic bottle only    Blood Culture - Blood, Hand, Right [[id number]] Collected: 02/22/21 0321    Lab Status: Final result Specimen: Blood from Hand, Right Updated: 02/27/21 0400     Blood Culture No growth at 5 days      Aerobic bottle only    Urine Culture - Urine, Urine, Catheter [[id number]]  (Normal) Collected: 02/21/21 1741    Lab Status: Final result Specimen: Urine, Catheter Updated: 02/23/21 0823     Urine Culture No growth    Clostridium Difficile Toxin - Stool, Per Rectum [[id number]]  (Normal) Collected: 02/22/21 0321    Lab Status: Final result Specimen: Stool from Per Rectum Updated: 02/22/21 0719    Narrative:      The following orders were created for panel order Clostridium Difficile Toxin - Stool, Per Rectum.  Procedure                               Abnormality         Status                     ---------                               -----------         ------                     Clostridium Difficile To...[[id number]]  Normal              Final result                 Please view results for these tests on the individual orders.    Clostridium Difficile Toxin, PCR - Stool, Per Rectum [[id number]]  (Normal) Collected: 02/22/21 0321    Lab Status: Final result Specimen: Stool from Per Rectum Updated: 02/22/21 0719     C. Difficile Toxins by PCR Not Detected    Narrative:      Performance characteristics of test not established for patients <2 years of age.  Negative for Toxigenic C. Difficile    Respiratory Panel PCR w/COVID-19(SARS-CoV-2)  [facility] In-House, NP Swab in UTM/VTM, 3-4 HR TAT - Swab, Nasopharynx [[id number]]  (Normal) Collected: 02/21/21 1741    Lab Status: Final result Specimen: Swab from Nasopharynx Updated: 02/21/21 1240     ADENOVIRUS, PCR Not Detected     Coronavirus 229E Not Detected     Coronavirus HKU1 Not Detected     Coronavirus NL63 Not Detected     Coronavirus OC43 Not Detected     COVID19 Not Detected     Human Metapneumovirus Not Detected     Human Rhinovirus/Enterovirus Not Detected     Influenza A PCR Not Detected     Influenza B PCR Not Detected     Parainfluenza Virus 1 Not Detected     Parainfluenza Virus 2 Not Detected     Parainfluenza Virus 3 Not Detected     Parainfluenza Virus 4 Not Detected     RSV, PCR Not Detected     Bordetella pertussis pcr Not Detected     Bordetella parapertussis PCR Not Detected     Chlamydophila pneumoniae PCR Not Detected     Mycoplasma pneumo by PCR Not Detected    Narrative:      Fact sheet for providers: https://[employer]/wp-content/uploads/MCS4166-5070-FZ8.1-EUA-Provider-Fact-Sheet-3.pdf    Fact sheet for patients: https://[employer]/wp-content/uploads/TIH7043-2549-QF4.1-EUA-Patient-Fact-Sheet-1.pdf    Test performed by PCR.          Personally reviewed CT A/P showing findings c/w constipation. Agree with interpretation.       I have reviewed the medications:  Scheduled Meds:atorvastatin, 10 mg, Oral, Nightly  budesonide-formoterol, 2 puff, Inhalation, BID - RT  carbidopa-levodopa, 2 tablet, Oral, TID  Divalproex Sodium, 125 mg, Oral, Q6H  escitalopram, 20 mg, Oral, Daily  folic acid, 1 mg, Oral, Daily  hydrocortisone, 25 mg, Rectal, BID  levothyroxine, 75 mcg, Oral, Q AM  nystatin, , Topical, Q12H  pantoprazole, 40 mg, Intravenous, Q12H  piperacillin-tazobactam, 3.375 g, Intravenous, Q6H  polyethylene glycol, 17 g, Oral, Daily  pramipexole, 0.25 mg, Oral, Daily  propranolol, 30 mg, Oral, Q12H  QUEtiapine, 12.5 mg, Oral, Nightly  sodium chloride, 10  mL, Intravenous, Q12H  topiramate, 100 mg, Oral, Daily      Continuous Infusions:lactated ringers, 9 mL/hr, Last Rate: 9 mL/hr (02/24/21 1024)      PRN Meds:.•  acetaminophen  •  albuterol  •  hydrALAZINE  •  potassium chloride **OR** potassium chloride **OR** potassium chloride  •  sodium chloride  •  sodium chloride    Assessment/Plan   Assessment & Plan     Active Hospital Problems    Diagnosis  POA   • **Colitis [K52.9]  Yes   • Acute lower GI bleeding [K92.2]  Unknown   • CHF (congestive heart failure) (CMS/[facility]) [I50.9]  Yes   • Bipolar 1 disorder (CMS/[facility]) [F31.9]  Yes   • Dementia (CMS/[facility]) [F03.90]  Yes   • Parkinson's disease (CMS/[facility]) [G20]  Yes   • Depression [F32.9]  Yes   • Hypertension [I10]  Yes   • Chronic asthma [J45.909]  Yes   • H/O aortic aneurysm repair [Z98.890, Z86.79]  Not Applicable      Resolved Hospital Problems   No resolved problems to display.        Brief Hospital Course to date:  Amador White is a 85 y.o. male with history of Parkinson's disease, dementia, hypertension AAA, CHF, recent hospitalization for persistent fever suspected secondary to UTI/pneumonia currently on IV antibiotics.  Patient presents to the ED with BRBPR found to have colitis/proctitis, admitted with GI bleed secondary to stercoral ulcers. Had recurrent bleeding 3/1. This is my first day assessing patient's active medical issues.     Rectal bleeding, recurrent  Acute blood loss anemia  -GI following, he is s/p C scope 2/25 with findings of multiple stercoral ulcers, likely a sequela of his recent severe constipation. Also noted to have internal hemorrhoids at that time. Have reconsulted them and they are planning repeat flex sig today.  -Was transfused 1 unit blood this am w/ appropriate response. Continue serial checks.  -Stop asa  -Start steroid suppositories.     Proctocolitis  -CT abdomen pelvis does reveal marked wall thickening and edema of sigmoid colon and rectum with severe fecal loading, concerning for  colitis and proctitis, C. difficile toxin is negative  -Continue antibiotics, currently on  Zosyn per ID  -Pathology from colonoscopy concerning for fragments of tubular adenoma-GI recommends surveillance flex sig in 3 months to evaluate healing of the ulcers and evaluate for carpet polyp     Recent enterococcal septicemia  -Patient recently hospitalized at [facility], discharged on IV ampicillin,  -Discussed with Dr. 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  -Continue Zosyn for now, changed to oral penicillin based antibiotic once IV antibiotics complete     Thrombocytopenia, Coagulopathy  -Suspect malnutrition. Received s/p Vitamin K x1 w/ improvement in INR.  -TCP due to blood loss, better. Monitor.     Chronic left IJ thrombus  -Suspect secondary to recent PICC line in the left arm (in place for 6 weeks, now removed).  Given that the thrombus appears chronic, combined with Mr White's recent GI bleeding, ongoing anemia and thrombocytopenia, will defer anticoagulation at present as the risks appear to outweigh benefits. My partner discussed with patient and spouse at bedside, they are agreeable with this plan.  Hold asa as above.     Hypertension  -Hold propranolol until bleeding has subsided. Will start IR propanolol when able to resume.    Hyperlipidemia-continue statin     Hypothyroidism-baseline TSH wnl, continue Synthroid     Hypokalemia and hypocalcemia-continue to monitor and replete per protocol     Abdominal aortic aneurysm-measuring 3.7 cm per current CT, seems to be chronic, per daughter he is s/p repair in 1993.     Parkinson's disease, dementia  -Progressive, continue carbidopa-levodopa     Depression and bipolar disorder  -Continue divalproex, escitalopram and topiramate     Prolonged QTC  -     Dysphagia  -patient with some aspiration of milk this morning at breakfast. Discussed ongoing risk of  aspiration from dysphagia with both the patient and his wife this morning at bedside.  We again considered the possibility of a PEG tube placement.  At this time Mr. White's wife, who makes medical decisions for him, feels that withholding food and instead giving enteral feeds would remove the great robert in life that eating often provides.  She would prefer to continue to a modified diet with the hope that soft textures and thickened liquids will lessen the risk of aspiration and pneumonia-but understands that according to speech therapy, the patient aspirated with all consistencies.  My partner did briefly touch on larger goals of care and I did mention that  and Mrs. White might benefit from the services of the palliative care team. Once bleeding episode has resolved will plan to continue ongoing discussions.     Unintentional weight loss  - 20 pound weight loss since December.  Asked dietitian to see-patient may not be consuming adequate calories at present.     This patient's problems and plans were partially entered by my partner and updated as appropriate by me 03/01/21.      DVT Prophylaxis: Mechanical     Disposition: TBD    CODE STATUS:   Code Status and Medical Interventions:   Ordered at: 02/21/21 5710     Level Of Support Discussed With:    Health Care Surrogate     Code Status:    CPR     Medical Interventions (Level of Support Prior to Arrest):    Full       Naomie Rivero II, DO  03/01/21

## 2021-03-01 NOTE — PROGRESS NOTES
Continued Stay Note  [facility]     Patient Name: Amador White  MRN: [id number]  Today's Date: 3/1/2021    Admit Date: 2/21/2021    Discharge Plan     Row Name 03/01/21 1444       Plan    Plan  [facility]    Plan Comments  I met with Sandra White, Mr. White's wife at the bedside earlier today. She still wishes for Mr. White to return to [facility] for skilled rehab after this admission. I confirmed with Olivia in admissions at [facility] that they will accept him back into their facility when medically ready. Mr. White is undergoing a procedure today per GI. He will require ambulance transportation back to [facility].    Final Discharge Disposition Code  30 - still a patient        Discharge Codes    No documentation.       Expected Discharge Date and Time     Expected Discharge Date Expected Discharge Time    Mar 3, 2021             Jorge Rizo RN

## 2021-03-01 NOTE — PLAN OF CARE
Goal Outcome Evaluation:  Plan of Care Reviewed With: patient     Outcome Summary: VSS, AO to self only, SR on monitor.  Patient arrived transferred to floor at approximately 0515.  Patient very pleasant and cooperative with care.  Patient had one extra large bowel movement with bright red blood.  Patient taking medications crushed in applesauce with honey thickened liquids.  Fall precautions in place, skin interventions in place.  Patient currently resting comfortably in bed.  Will continue to monitor.

## 2021-03-01 NOTE — PLAN OF CARE
Goal Outcome Evaluation:  Plan of Care Reviewed With: patient  Progress: improving  Outcome Summary: VSS. AO to self. Pt continued to have bright red stool this AM, GI performed flex sigmoidoscopy. No reports of pain. Wife hopeful of discharge soon.

## 2021-03-01 NOTE — PLAN OF CARE
Goal Outcome Evaluation:  Pt had multiple bright red BMs with clots last night, hypotensive around 2300, afebrile, alert but disoriented (baseline). Bolus 500 administered. New Ultrasound guided 20 IV inserted. 1 unit PRBCs administered. On call hospitalist Dr. Sheldon aware. Vitals stable at this time.   Ceiling tiles in pt room leaking and falling. Pt being prepared to transfer to . Will continue to monitor.

## 2021-03-01 NOTE — BRIEF OP NOTE
SIGMOIDOSCOPY FLEXIBLE  Progress Note    Amador White  3/1/2021    Flexible sigmoidoscopy reveals at least 30% healing of rectal stercoral ulcers as compared to last week.  A Dieulafoy lesion was seen, the likely source of recurrent rectal bleeding.  This was treated with an 11/6t [place] clip.    >> Observe for further bleeding.    Anticipate home in 1 to 2 days.    Mark I. Brunner, MD     Date: 3/1/2021  Time: 17:06 EST

## 2021-03-02 NOTE — PLAN OF CARE
Goal Outcome Evaluation:  Plan of Care Reviewed With: patient  Progress: improving  Outcome Summary: Patient alert and pleasently confused, very motivated for therapy. Mild improvements noted in functional mobility. Performs bed mobility with modA x2. Performs sit<>stand transfer with FWW x2 attempts with maxAx2 more likely due to motor control deficit vs strength. Notable difficulty following cuing for sequencing. Performs sit>pivot transfer EOB >recliner with maxA x2. Strong effort with all TE in sitting. Cont to recommend IPR at dc.

## 2021-03-02 NOTE — PLAN OF CARE
Goal Outcome Evaluation:  Plan of Care Reviewed With: patient     Outcome Summary: VSS, AO to self.  Patient pleasant and cooperative with care.  Patient attempted to get out of bed once due to confusion, [place]  Patient easily redirected and repositioned in bed.  Patient currently resting comfortably in bed.  [facility].  Will continue to monitor.

## 2021-03-02 NOTE — PROGRESS NOTES
Amador White  1935  [id number]    Date of Consult: 2/23/21    Requesting Provider: Dr. Guthrie  Evaluating Physician: Raphael Carlos MD    Chief Complaint: BRBPR    Reason for Consultation: recent enterococcus sepsis, presented with proctocolitis    History of present illness:    Amador White is a 85-year-old man with a past medical history significant for Parkinson's disease and h/o aortic aneurysm repair. The patient is known to me from multiple recent admissions to [facility] during which he was initially admitted in 12/2020 and grew enterococcus faecalis that was PCN-S from both urine and blood cultures. The patient had persistently positive blood cultures but did clear his blood cultures on 12/21/20 on IV antibiotics. TTE and TTE during that admission were negative for vegetations. CTA C/A/P was done due to prior aortic aneurysm surgery and he was evaluated by vascular surgery who determined that imaging was unchanged and not concerning for mycotic aneurysm. The patient was continued on IV PCN for a total 2 week IV antibiotic course. He was doing well at his follow up appointment in my clinic on 1/4/21 and IV antibiotics were stopped.     The patient was again admitted to [facility] in 01/2021 for recurrent enterococcal septicemia and again had a negative TTE for infective endocarditis. Blood cultures quickly cleared on Ampicillin and synergistic ceftriaxone but due to concern for endovascular source given his recurrent septicemia with enterococcus faecalis so plan at time of discharge was for IV ampicillin 2g IV q4h + synergistic ceftriaxone 2g IV q12h and for a 6 week course from time of blood culture clearance until 2/26/21. The patient has been followed weekly and has been doing relatively well. He did recently have a decrease in plt count on labs from 2/18 from 111-->83. He was last seen by myself via telehealth on 2/15 and reported no new issues.    The patient presented to [facility] ER on 2/21 with c/o lower  abdominal pain with diarrhea. On arrival, the patient was hypotensive to 82/48. Tmax 100.5F. Labs showed hgb 8.6 (was 10.5 on labs from 2/18), plts 109, lactic acid 2.3, K+ 2.9, calcium 8.5, Cr 0.7. iron level is low. LFTs were WNL.  urinalysis was positive for trace leuk est, nitrites, and 6-12 WBCs.  Respiratory PCR panel was negative including for COVID-19. WBC count has been normal. Blood cultures from 2/22 are NGTD. C diff PCR was negative. Despite PRBC, repeat hgb today was 7.2. CT A/P from 2/21 showed fairly marked wall thickening and edema of the sigmoid colon and rectum compatible with colitis and proctitis. There is severe fecal loading of the colon. The patient has been on ceftriaxone and flagyl. GI is following and planning for colonoscopy. ID consulted for abx recs.      Subjective:    2/24/21: The patient is still very encephalopathic and unable to provide any history. Ddi have fever to 100.5F yesterday but no fevers today. Had stercoral ulcers on colonoscopy today. Biopsy done and pending. I discussed with his wife at bedside today.    2/25/21: patient is responding better today although still with some confusion. More awake. Denies any pain. No fevers. Wife is at bedside. Awaiting biopsy results.     2/26/21: the patient is doing better today and more alert and responding to questions appropriately. He is sitting in bedside chair. Did have some anemia over last 24hrs and received PRBC this AM. No fevers. Ulcer biopsy with no evidence of viral inclusions reported. Fragments of tubular adenoma seen on biopsy but no high grade dysplasia.    2/27/21:  He remains afebrile. 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 No recurrent signs of rectal bleeding.  Wife tearful at bedside.      2/28/21: No fever.  He appears agitated this afternoon. Tentatively scheduled for [place] tomorrow at 11am.  He is up in the chair.      3/2/21:  the patient states that he is feeling poorly and fatigued. No pain anywhere. His LUE swelling and pain has improved. No fevers. Still with significant anemia and had flex sig yesterday with about 30% improvement in his ulcers.     Past Medical History:   Diagnosis Date   • Arthritis    • Asthma    • Bipolar 1 disorder (CMS/HCC)    • CHF (congestive heart failure) (CMS/[facility])    • COPD (chronic obstructive pulmonary disease) (CMS/[facility])    • Dementia (CMS/[facility])    • Depression    • Disease of thyroid gland    • H/O chest x-ray 08/08/2012    Left diaphragm to be elevated chronically. There is evidence of previous surgery on left with line of clips along the left hilum. There is large thoracic aortic aneurysm overlying the chest. There is a graft into the left hilum and a clip in left paratracheal region. There are mirco calcifications in right base. Apices appear hyperlucent, right more so that left. I cannot rule out bullous disease   • Hyperlipidemia    • Hypertension    • Insomnia    • Parkinson's disease (CMS/[facility])    • RLS (restless legs syndrome)    • Tremor of hands and face     Follwed by Dr. Dumont   enterococcal septicemia    Past Surgical History:   Procedure Laterality Date   • ABDOMINAL AORTIC ANEURYSM REPAIR      1993   • CATARACT EXTRACTION, BILATERAL  2017   • CHOLECYSTECTOMY     • COLONOSCOPY N/A 2/24/2021    Procedure: COLONOSCOPY;  Surgeon: Brunner, Mark I, MD;  Location:  [facility];  Service: Gastroenterology;  Laterality: N/A;   • EYE SURGERY     • LARYNX SURGERY     • SIGMOIDOSCOPY N/A 3/1/2021    Procedure: SIGMOIDOSCOPY FLEXIBLE;  Surgeon: Brunner, Mark I, MD;  Location:  [facility];  Service: Gastroenterology;  Laterality: N/A;   • TONSILLECTOMY AND ADENOIDECTOMY         Social History:  . No ETOH, tobacco, or illicit drug use    family history includes Aneurysm in his sister; Asthma in his daughter; Cancer in his sister; Heart disease in his father; Heart failure in his  mother.    Allergies   Allergen Reactions   • Zolpidem Hallucinations       Medication:  Current Facility-Administered Medications   Medication Dose Route Frequency Provider Last Rate Last Admin   • acetaminophen (TYLENOL) tablet 650 mg  650 mg Oral Q6H PRN Brunner, Mark I, MD   650 mg at 02/23/21 1333   • albuterol (PROVENTIL) nebulizer solution 0.083% 2.5 mg/3mL  2.5 mg Nebulization Q6H PRN Brunner, Mark I, MD       • atorvastatin (LIPITOR) tablet 10 mg  10 mg Oral Nightly Brunner, Mark I, MD   10 mg at 03/01/21 2032   • budesonide-formoterol (SYMBICORT) 160-4.5 MCG/ACT inhaler 2 puff  2 puff Inhalation BID - RT Brunner, Mark I, MD   2 puff at 03/02/21 1026   • carbidopa-levodopa (SINEMET)  MG per tablet 2 tablet  2 tablet Oral TID Brunner, Mark I, MD   2 tablet at 03/02/21 0825   • Divalproex Sodium (DEPAKOTE SPRINKLE) capsule 125 mg  125 mg Oral Q6H Brunner, Mark I, MD   125 mg at 03/02/21 0545   • escitalopram (LEXAPRO) tablet 20 mg  20 mg Oral Daily Brunner, Mark I, MD   20 mg at 03/02/21 0825   • folic acid (FOLVITE) tablet 1 mg  1 mg Oral Daily Brunner, Mark I, MD   1 mg at 03/02/21 0825   • hydrALAZINE (APRESOLINE) injection 10 mg  10 mg Intravenous Q6H PRN Brunner, Mark I, MD       • lactated ringers infusion  9 mL/hr Intravenous Continuous Brunner, Mark I, MD 9 mL/hr at 02/24/21 1024 New Bag at 02/24/21 1148   • lactated ringers solution  20 mL/hr Intravenous Continuous Brunner, Mark I, MD 20 mL/hr at 03/01/21 1432 250 mL at 03/01/21 1548   • levothyroxine (SYNTHROID, LEVOTHROID) tablet 75 mcg  75 mcg Oral Q AM Brunner, Mark I, MD   75 mcg at 03/02/21 0545   • nystatin (MYCOSTATIN) powder   Topical Q12H Brunner, Mark I, MD   Given at 03/02/21 0825   • pantoprazole (PROTONIX) injection 40 mg  40 mg Intravenous Q12H Brunner, Mark I, MD   40 mg at 03/02/21 0825   • penicillin v potassium (VEETID) tablet 500 mg  500 mg Oral Q12H Raphael Carlos MD   500 mg at 03/02/21 0953   • polyethylene glycol  (MIRALAX) packet 17 g  17 g Oral Daily Brunner, Mark I, MD   17 g at 03/02/21 0825   • potassium chloride (MICRO-K) CR capsule 40 mEq  40 mEq Oral PRN Brunner, Mark I, MD   40 mEq at 02/25/21 1827    Or   • potassium chloride (KLOR-CON) packet 40 mEq  40 mEq Oral PRN Brunner, Mark I, MD   40 mEq at 02/26/21 0629    Or   • potassium chloride 10 mEq in 100 mL IVPB  10 mEq Intravenous Q1H PRN Brunner, Mark I,  mL/hr at 02/22/21 0542 10 mEq at 02/22/21 0542   • pramipexole (MIRAPEX) tablet 0.25 mg  0.25 mg Oral Daily Brunner, Mark I, MD   0.25 mg at 03/02/21 0825   • QUEtiapine (SEROquel) tablet 12.5 mg  12.5 mg Oral Nightly Brunner, Mark I, MD   12.5 mg at 03/01/21 2032   • sodium chloride 0.9 % flush 10 mL  10 mL Intravenous PRN Brunner, Mark I, MD       • sodium chloride 0.9 % flush 10 mL  10 mL Intravenous Q12H Brunner, Mark I, MD   10 mL at 03/02/21 0826   • sodium chloride 0.9 % flush 10 mL  10 mL Intravenous PRN Brunner, Mark I, MD       • topiramate (TOPAMAX) tablet 100 mg  100 mg Oral Daily Brunner, Mark I, MD   100 mg at 03/02/21 0825         Infectious History:  COVID Screen (preop/placement)    Antibiotics:  Anti-Infectives (From admission, onward)    Ordered     Dose/Rate Route Frequency Start Stop    03/02/21 0843  penicillin v potassium (VEETID) tablet 500 mg     Ordering Provider: Raphael Carlos MD    500 mg Oral Every 12 Hours Scheduled 03/02/21 0930 04/01/21 0859    02/23/21 0711  piperacillin-tazobactam (ZOSYN) 3.375 g in iso-osmotic dextrose 50 ml (premix)     Ordering Provider: Raphael Carlos MD    3.375 g  over 30 Minutes Intravenous Once 02/23/21 0800 02/23/21 0958    02/21/21 1914  cefTRIAXone (ROCEPHIN) 1 g/100 mL 0.9% NS (MBP)     Ordering Provider: Raj Sal MD    1 g  over 30 Minutes Intravenous Once 02/21/21 1916 02/21/21 2012 02/21/21 1914  metroNIDAZOLE (FLAGYL) 500 mg/100mL IVPB     Ordering Provider: Raj Sal MD    500 mg  over 30 Minutes  "Intravenous Once 02/21/21 1916 02/21/21 2022            Review of Systems    Unable to obtain a full 12 point ROS given the patient's severe encephalopathy    Physical Exam:   Vital Signs   /49 (BP Location: Right arm, Patient Position: Lying)   Pulse 59   Temp 97.6 °F (36.4 °C) (Oral)   Resp 18   Ht 172.7 cm (68\")   Wt 70.6 kg (155 lb 11.2 oz)   SpO2 97%   BMI 23.67 kg/m²     GENERAL: awake and alert. NAD  HENT: Normocephalic, atraumatic. No external oral lesions noted  Eyes: anicteric. No conjunctival hemorrhages.  NECK: Supple without nuchal rigidity. No mass.  HEART: RRR; 3/6  murmur, rubs, gallops.   LUNGS: CTAB. Normal respiratory effort. Nonlabored.  ABDOMEN: Soft, nontender, nondistended. NO mass or HSM.  :  Valencia catheter.  SKIN: LUE hematoma improving. No new rashes noted over exposed skin. No peripheral stigmata of infective endocarditis noted.  NEURO: AAOX3. Normal speech and cognition. Responding to questions better   PSYCHIATRIC:  cooperative. Appropriate mood    Laboratory Data    Results from last 7 days   Lab Units 03/02/21  0910 03/01/21  2357 03/01/21  1735  02/28/21  2102 02/28/21  0449   WBC 10*3/mm3 6.90  --   --   --  9.40 5.88   HEMOGLOBIN g/dL 8.7* 7.2* 8.8*   < > 8.6* 8.4*   HEMATOCRIT % 27.4* 23.9* 29.1*   < > 28.3* 28.7*   PLATELETS 10*3/mm3 146  --   --   --  148 131*    < > = values in this interval not displayed.     Results from last 7 days   Lab Units 02/27/21  0444   SODIUM mmol/L 144   POTASSIUM mmol/L 3.8   CHLORIDE mmol/L 112*   CO2 mmol/L 28.0   BUN mg/dL 10   CREATININE mg/dL 0.44*   GLUCOSE mg/dL 82   CALCIUM mg/dL 8.0*                   Estimated Creatinine Clearance: 68.6 mL/min (A) (by C-G formula based on SCr of 0.44 mg/dL (L)).       Microbiology:    Blood Culture   Date Value Ref Range Status   02/22/2021 No growth at 24 hours  Preliminary   02/22/2021 Aerobic bottle only  Preliminary   02/22/2021 No growth at 24 hours  Preliminary   02/22/2021 Aerobic " bottle only  Preliminary         Radiology:  Xr Forearm 2 View Left    Result Date: 2/24/2021  No retained radiopaque foreign body, acute fracture or dislocation. The partially imaged wrist and elbow joints appear well aligned.  This report was finalized on 2/24/2021 3:41 PM by Sameer Owens.      Ct Angiogram Abdomen Pelvis    Result Date: 2/24/2021  Evidence of interval fecal disimpaction with significantly decreased rectal stool burden. There is mildly improved but persistent rectal wall edema compatible with associated proctitis. Small and large bowel loops are otherwise nondilated. No additional acute findings or change from recent comparison.  Vascular examination demonstrates probable chronic abdominal aortic dissection with nonopacified false lumen. 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 The major visceral branching arteries and bilateral iliac branching arteries are otherwise atherosclerotic but patent.   This report was finalized on 2/24/2021 10:18 AM by Sameer Owens.      Fl Video Swallow With Speech Single Contrast    Result Date: 2/26/2021  Fluoroscopy provided for a modified barium swallow. Please see speech therapy report for full details and recommendations.    This report was finalized on 2/26/2021 6:08 PM by Dr. Kaela Ravi MD.      Xr Abdomen Kub    Result Date: 2/23/2021  Advancement of nasogastric tube now terminating within the mid stomach in appropriate location.  D:  02/23/2021 E:  02/23/2021  This report was finalized on 2/23/2021 2:05 PM by Dr. Vic Pressley.      Xr Abdomen Kub    Result Date: 2/23/2021  Nasogastric tube terminates at the GE junction level with advancement recommended.  D:  02/23/2021 E:  02/23/2021  This report was finalized on 2/23/2021 2:05 PM by Dr. Vic Pressley.         Impression:   Amador White is a 85-year-old man with a past medical  history significant for Parkinson's disease and h/o aortic aneurysm repair. 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 He is now presenting with encephalopathy, GI bleeding, and signs of proctocolitis on abdominal imaging. Has stercoral ulcers on colonoscopy that are likely due to fecal impaction per Dr. Brunner but he agrees that it is worth starting empiric acyclovir for HSV pending biopsy results which should be available tomorrow. (especially in the setting of the patient's severe encephalopathy). Renal function ok.     Ulcer biopsy with no evidence of viral inclusions reported. Fragments of tubular adenoma seen on biopsy but no high grade dysplasia.      Problems:  -acute toxic/metabolic encephalopathy- improving   -Fever, improving   -GI bleeding/diarrhea, -no diarrhea. Still anemic  -stercoral ulcers  -tubular adenoma   -proctocolitis  -Enterococcal septicemia/endovascular infection  -Acute blood loss anemia/iron deficiency anemia- ongoing  -Thrombocytopenia  -Hypokalemia  -Hypotension/lactic acidosis  -pneumobilia  -PD  -S/p aortic aneurysm repair  --Chronic PARUL DVT, and acute LUE superfical thrombophlebitis     PLAN: Thank you for asking us to see Amador White, I recommend the following:     -stop Zosyn and start oral PCN    Will likely remain inpatient for another day or 2 per GI. Still has anemia. I am ok with discharge when cleared by GI.    UM/PIPE:  1. PCN  mg PO BID. Will need to continue oral PCN indefinitely given aorta graft material that is likely infected. Will continue at this dosing for now.  2. Follow up with me either in clinic or via telehealth in about 2-3 weeks.    I discussed with his wife today at bedside  Raphael Carlos MD  3/2/2021

## 2021-03-02 NOTE — THERAPY TREATMENT NOTE
Patient Name: Amador White  : 1935    MRN: [id number]                              Today's Date: 3/2/2021       Admit Date: 2021    Visit Dx:     ICD-10-CM ICD-9-CM   1. Sepsis, due to unspecified organism, unspecified whether acute organ dysfunction present (CMS/[facility])  A41.9 038.9     995.91   2. Acute lower GI bleeding  K92.2 578.9   3. Anemia, unspecified type  D64.9 285.9   4. Dysphagia, unspecified type  R13.10 787.20   5. Ulcer of rectum  K62.6 569.41     Patient Active Problem List   Diagnosis   • Chronic fibrosis of lung (CMS/[facility])   • Chronic asthma   • Allergic rhinitis   • Hypertension   • Migraine   • Left Diaphragmatic paralysis/postop   • Bronchiectasis without complication (CMS/[facility])   • Colitis   • CHF (congestive heart failure) (CMS/[facility])   • Bipolar 1 disorder (CMS/[facility])   • Dementia (CMS/[facility])   • Parkinson's disease (CMS/[facility])   • H/O aortic aneurysm repair   • Depression   • Acute lower GI bleeding     Past Medical History:   Diagnosis Date   • Arthritis    • Asthma    • Bipolar 1 disorder (CMS/[facility])    • CHF (congestive heart failure) (CMS/[facility])    • COPD (chronic obstructive pulmonary disease) (CMS/[facility])    • Dementia (CMS/[facility])    • Depression    • Disease of thyroid gland    • H/O chest x-ray 2012    Left diaphragm to be elevated chronically. There is evidence of previous surgery on left with line of clips along the left hilum. There is large thoracic aortic aneurysm overlying the chest. There is a graft into the left hilum and a clip in left paratracheal region. There are mirco calcifications in right base. Apices appear hyperlucent, right more so that left. I cannot rule out bullous disease   • Hyperlipidemia    • Hypertension    • Insomnia    • Parkinson's disease (CMS/H[facility])    • RLS (restless legs syndrome)    • Tremor of hands and face     Follwed by Dr. Dumont     Past Surgical History:   Procedure Laterality Date   • ABDOMINAL AORTIC ANEURYSM REPAIR         • CATARACT  EXTRACTION, BILATERAL  2017   • CHOLECYSTECTOMY     • COLONOSCOPY N/A 2/24/2021    Procedure: COLONOSCOPY;  Surgeon: Brunner, Mark I, MD;  Location:  [facility];  Service: Gastroenterology;  Laterality: N/A;   • EYE SURGERY     • LARYNX SURGERY     • SIGMOIDOSCOPY N/A 3/1/2021    Procedure: SIGMOIDOSCOPY FLEXIBLE;  Surgeon: Brunner, Mark I, MD;  Location:  [facility];  Service: Gastroenterology;  Laterality: N/A;   • TONSILLECTOMY AND ADENOIDECTOMY       General Information     Row Name 03/02/21 1729          OT Time and Intention    Document Type  therapy note (daily note)  (Pended)   -RS     Mode of Treatment  occupational therapy;individual therapy  (Pended)   -RS     Row Name 03/02/21 1729          General Information    Existing Precautions/Restrictions  fall  (Pended)   -RS     Row Name 03/02/21 1729          Cognition    Orientation Status (Cognition)  disoriented to;person;place;situation;time;verbal cues/prompts needed for orientation  (Pended)   -RS     Row Name 03/02/21 1729          Safety Issues, Functional Mobility    Safety Issues Affecting Function (Mobility)  ability to follow commands;awareness of need for assistance;insight into deficits/self-awareness;judgment;problem-solving;safety precaution awareness;safety precautions follow-through/compliance;sequencing abilities  (Pended)   -RS     Impairments Affecting Function (Mobility)  balance;cognition;coordination;endurance/activity tolerance;motor control;motor planning;postural/trunk control;strength  (Pended)   -RS     Cognitive Impairments, Mobility Safety/Performance  attention;awareness, need for assistance;impulsivity;insight into deficits/self-awareness;judgment;problem-solving/reasoning;safety precaution awareness;safety precaution follow-through;sequencing abilities  (Pended)   -RS       User Key  (r) = Recorded By, (t) = Taken By, (c) = Cosigned By    Initials Name Provider Type    RS Valentín Nazario OT Student           Mobility/ADL's     Row Name 03/02/21 1730          Bed Mobility    Bed Mobility  scooting/bridging;supine-sit;sit-supine;rolling left;rolling right  (Pended)   -RS     Rolling Left [place] (Bed Mobility)  moderate assist (50% patient effort);2 person assist;verbal cues;nonverbal cues (demo/gesture)  (Pended)   -RS     Rolling Right [place] (Bed Mobility)  verbal cues;nonverbal cues (demo/gesture);maximum assist (25% patient effort);2 person assist  (Pended)   -RS     Scooting/Bridging [place] (Bed Mobility)  maximum assist (25% patient effort);2 person assist;verbal cues;nonverbal cues (demo/gesture)  (Pended)   -RS     Sit-Supine [place] (Bed Mobility)  maximum assist (25% patient effort);1 person assist;2 person assist;verbal cues;nonverbal cues (demo/gesture)  (Pended)   -RS     Bed Mobility, Safety Issues  cognitive deficits limit understanding;decreased use of legs for bridging/pushing;decreased use of arms for pushing/pulling;impaired trunk control for bed mobility  (Pended)   -RS     Assistive Device (Bed Mobility)  draw sheet;head of bed elevated;bed rails  (Pended)   -RS     Comment (Bed Mobility)  Required vc for hand placement.  (Pended)   -RS     Row Name 03/02/21 1730          Transfers    Transfers  bed-chair transfer;sit-stand transfer  (Pended)   -RS     Comment (Transfers)  Deficits in command following. Unable to follow command to flex L Knee despite  (Pended)   -RS     Bed-Chair [place] (Transfers)  maximum assist (25% patient effort);verbal cues;nonverbal cues (demo/gesture);2 person assist  (Pended)   -RS     Assistive Device (Bed-Chair Transfers)  other (see comments)  (Pended)  BUE support  -RS     Sit-Stand [place] (Transfers)  verbal cues;maximum assist (25% patient effort);2 person assist;nonverbal cues (demo/gesture)  (Pended)   -RS     Row Name 03/02/21 1730          Sit-Stand Transfer    Assistive Device (Sit-Stand Transfers)  other (see comments)   (Pended)  BUE support  -RS     Row Name 03/02/21 1730          Functional Mobility    Functional Mobility- Ind. Level  not tested  (Pended)   -RS     Row Name 03/02/21 1730          Activities of Daily Living    BADL Assessment/Intervention  grooming;upper body dressing;toileting  (Pended)   -RS     Row Name 03/02/21 1730          Toileting Assessment/Training    [place] Level (Toileting)  dependent (less than 25% patient effort);perform perineal hygiene  (Pended)   -RS     Position (Toileting)  supine  (Pended)   -RS     Comment (Toileting)  Pt had BM prior of entry. Was dependent for perineal care.  (Pended)   -RS     Row Name 03/02/21 1730          Upper Body Dressing Assessment/Training    [place] Level (Upper Body Dressing)  upper body dressing skills;doff;don;pajama/robe;dependent (less than 25% patient effort)  (Pended)   -RS     Position (Upper Body Dressing)  supported sitting  (Pended)   -RS       User Key  (r) = Recorded By, (t) = Taken By, (c) = Cosigned By    Initials Name Provider Type    RS Valentín Nazario OT Student        Obj/Interventions     Row Name 03/02/21 1738          Balance    Balance Assessment  sitting static balance;sitting dynamic balance;standing static balance;standing dynamic balance  (Pended)   -RS     Static Sitting Balance  supported;sitting in chair;mild impairment  (Pended)   -RS     Dynamic Sitting Balance  supported;sitting in chair;moderate impairment  (Pended)   -RS     Static Standing Balance  severe impairment;supported;standing  (Pended)   -RS     Dynamic Standing Balance  severe impairment;supported;standing  (Pended)   -RS     Comment, Balance  Severe retrograde posture  (Pended)   -RS       User Key  (r) = Recorded By, (t) = Taken By, (c) = Cosigned By    Initials Name Provider Type    Valentín Che OT Student        Goals/Plan     Row Name 03/02/21 6218          Bed Mobility Goal 1 (OT)    Activity/Assistive Device (Bed Mobility Goal 1, OT)  sit to supine   (Pended)   -RS     [place] Level/Cues Needed (Bed Mobility Goal 1, OT)  moderate assist (50-74% patient effort)  (Pended)   -RS     Time Frame (Bed Mobility Goal 1, OT)  by discharge  (Pended)   -RS     Progress/Outcomes (Bed Mobility Goal 1, OT)  goal ongoing  (Pended)   -RS     Row Name 03/02/21 1741          Transfer Goal 1 (OT)    Activity/Assistive Device (Transfer Goal 1, OT)  bed-to-chair/chair-to-bed;toilet;walker, rolling  (Pended)   -RS     [place] Level/Cues Needed (Transfer Goal 1, OT)  verbal cues required;moderate assist (50-74% patient effort)  (Pended)   -RS     Time Frame (Transfer Goal 1, OT)  by discharge  (Pended)   -RS     Progress/Outcome (Transfer Goal 1, OT)  goal ongoing  (Pended)   -RS     Row Name 03/02/21 9819          Grooming Goal 1 (OT)    Activity/Device (Grooming Goal 1, OT)  hair care;oral care;wash face, hands  (Pended)   -RS     [place] (Grooming Goal 1, OT)  set-up required;supervision required;verbal cues required  (Pended)   -RS     Time Frame (Grooming Goal 1, OT)  by discharge  (Pended)   -RS     Progress/Outcome (Grooming Goal 1, OT)  goal ongoing  (Pended)   -RS     Row Name 03/02/21 6049          Self-Feeding Goal 1 (OT)    Activity/Device (Self-Feeding Goal 1, OT)  liquids to mouth;scoop food and bring to mouth  (Pended)   -RS     [place] Level/Cues Needed (Self-Feeding Goal 1, OT)  set-up required;supervision required  (Pended)   -RS     Time Frame (Self-Feeding Goal 1, OT)  by discharge  (Pended)   -RS     Progress/Outcomes (Self-Feeding Goal 1, OT)  goal ongoing  (Pended)   -RS     Row Name 03/02/21 2396          ROM Goal 1 (OT)    Time Frame (ROM Goal 1, OT)  by discharge  (Pended)   -RS       User Key  (r) = Recorded By, (t) = Taken By, (c) = Cosigned By    Initials Name Provider Type    RS Valentín Nazario OT Student        Clinical Impression     Row Name 03/02/21 6106          Pain Assessment    Additional Documentation  Pain Scale: FACES  Pre/Post-Treatment (Group)  (Pended)   -     Row Name 03/02/21 1739          Pain Scale: FACES Pre/Post-Treatment    Pain: FACES Scale, Pretreatment  0-->no hurt  (Pended)   -RS     Posttreatment Pain Rating  0-->no hurt  (Pended)   -     Row Name 03/02/21 1739          Plan of Care Review    Plan of Care Reviewed With  patient  (Pended)   -RS     Progress  no change  (Pended)   -     Outcome Summary  Pt alert & disoriented to person, place, situation, and time. Pt was limited in session d/t confusion and deficits in command following. Pt perform bed mobility with Max x2. Pt transfers with Max x2 and unable to follow verbal and tactile cuing for R knee flexion. Pt washed face with CGA. Pt was dependent for UBD and perineal care.  Recommend d/c to SNF.  (Pended)   -     Row Name 03/02/21 1739          Therapy Plan Review/Discharge Plan (OT)    Anticipated Discharge Disposition (OT)  skilled nursing facility  (Pended)   -     Row Name 03/02/21 1739          Vital Signs    Pre Systolic BP Rehab  --  (Pended)  VSS  -RS     O2 Delivery Pre Treatment  room air  (Pended)   -RS     O2 Delivery Intra Treatment  room air  (Pended)   -RS     O2 Delivery Post Treatment  room air  (Pended)   -RS     Pre Patient Position  Sitting  (Pended)   -RS     Intra Patient Position  Standing  (Pended)   -RS     Post Patient Position  Supine  (Pended)   -     Row Name 03/02/21 1739          Positioning and Restraints    Pre-Treatment Position  sitting in chair/recliner  (Pended)   -RS     Post Treatment Position  bed  (Pended)   -RS     In Bed  supine;call light within reach;encouraged to call for assist;exit alarm on  (Pended)   -RS       User Key  (r) = Recorded By, (t) = Taken By, (c) = Cosigned By    Initials Name Provider Type    Valentín Che OT Student        Outcome Measures     Row Name 03/02/21 8548          How much help from another is currently needed...    Putting on and taking off regular lower body clothing?   1  (Pended)   -RS     Bathing (including washing, rinsing, and drying)  2  (Pended)   -RS     Toileting (which includes using toilet bed pan or urinal)  1  (Pended)   -RS     Putting on and taking off regular upper body clothing  2  (Pended)   -RS     Taking care of personal grooming (such as brushing teeth)  2  (Pended)   -RS     Eating meals  2  (Pended)   -RS     AM-PAC 6 Clicks Score (OT)  10  (Pended)   -RS     Row Name 03/02/21 1749          Functional Assessment    Outcome Measure Options  AM-PAC 6 Clicks Daily Activity (OT)  (Pended)   -RS       User Key  (r) = Recorded By, (t) = Taken By, (c) = Cosigned By    Initials Name Provider Type    Valentín Che OT Student        Occupational Therapy Education                 Title: PT OT SLP Therapies (In Progress)     Topic: Occupational Therapy (In Progress)     Point: ADL training (In Progress)     Description:   Instruct learner(s) on proper safety adaptation and remediation techniques during self care or transfers.   Instruct in proper use of assistive devices.              Learning Progress Summary           Patient Acceptance, E,TB, NR by RS at 3/2/2021 1750    Acceptance, E,TB, VU by KR at 2/28/2021 0656    Acceptance, E, NR by YOSI at 2/25/2021 0800    Acceptance, E, VU,NR by AC at 2/22/2021 1048    Comment: benefits of activity   Family Acceptance, E, VU,NR by AC at 2/22/2021 1048    Comment: benefits of activity                   Point: Home exercise program (Done)     Description:   Instruct learner(s) on appropriate technique for monitoring, assisting and/or progressing therapeutic exercises/activities.              Learning Progress Summary           Patient Acceptance, E,TB, VU by KR at 2/28/2021 0656                   Point: Precautions (In Progress)     Description:   Instruct learner(s) on prescribed precautions during self-care and functional transfers.              Learning Progress Summary           Patient Acceptance, E,TB, NR by RS at  3/2/2021 1750    Acceptance, E,TB, VU by KR at 2/28/2021 0656    Acceptance, E, NR by YOSI at 2/25/2021 0800                   Point: Body mechanics (In Progress)     Description:   Instruct learner(s) on proper positioning and spine alignment during self-care, functional mobility activities and/or exercises.              Learning Progress Summary           Patient Acceptance, E,TB, NR by RS at 3/2/2021 1750    Acceptance, E,TB, VU by KR at 2/28/2021 0656                               User Key     Initials Effective Dates Name Provider Type Discipline    AC 06/23/15 -  Amanda Arias OT Occupational Therapist OT    KR 01/04/21 -  Miguelina Eugene, RN Registered Nurse Nurse    YOSI 01/29/20 -  Donna Eubanks OT Occupational Therapist OT     12/04/20 -  Valentín Nazario OT Student OT              OT Recommendation and Plan     Plan of Care Review  Plan of Care Reviewed With: (P) patient  Progress: (P) no change  Outcome Summary: (P) Pt alert & disoriented to person, place, situation, and time. Pt was limited in session d/t confusion and deficits in command following. Pt perform bed mobility with Max x2. Pt transfers with Max x2 and unable to follow verbal and tactile cuing for R knee flexion. Pt washed face with CGA. Pt was dependent for UBD and perineal care.  Recommend d/c to SNF.     Time Calculation:   Time Calculation- OT     Row Name 03/02/21 1654             Time Calculation- OT    OT Start Time  1654  (Pended)   -      OT Received On  03/02/21  (Pended)   -      OT Goal Re-Cert Due Date  03/04/21  (Pended)   -         Timed Charges    49984 - OT Self Care/Mgmt Minutes  31  (Pended)   -        User Key  (r) = Recorded By, (t) = Taken By, (c) = Cosigned By    Initials Name Provider Type     Valentín Nazario OT Student        Therapy Charges for Today     Code Description Service Date Service Provider Modifiers Qty    [id number]  OT SELF CARE/MGMT/TRAIN EA 15 MIN 3/2/2021 Valentín Nazario GO 2               Valentín MEDINA  Seeney  3/2/2021

## 2021-03-02 NOTE — PROGRESS NOTES
"GI Daily Progress Note  Subjective:    Chief Complaint: Follow-up rectal bleeding    Patient has dementia.  He reports he has moved his bowels.  He is not sure if there is blood in the stool.  Discussed with nurse, who noted a minuscule amount of blood on her finger when inserting a Anusol suppository today.  There is no blood in the stools.    Objective:    /49 (BP Location: Right arm, Patient Position: Lying)   Pulse 59   Temp 97.6 °F (36.4 °C) (Oral)   Resp 18   Ht 172.7 cm (68\")   Wt 70.6 kg (155 lb 11.2 oz)   SpO2 97%   BMI 23.67 kg/m²     Physical Exam  Constitutional:       General: He is not in acute distress.     Appearance: He is normal weight. He is ill-appearing. He is not toxic-appearing.   HENT:      Head: Normocephalic.      Mouth/Throat:      Mouth: Mucous membranes are moist.      Pharynx: No oropharyngeal exudate.   Eyes:      General: No scleral icterus.     Conjunctiva/sclera: Conjunctivae normal.   Neck:      Musculoskeletal: Normal range of motion.   Cardiovascular:      Rate and Rhythm: Normal rate.      Pulses: Normal pulses.   Pulmonary:      Effort: Pulmonary effort is normal. No respiratory distress.      Breath sounds: No stridor. No wheezing or rales.   Abdominal:      General: Bowel sounds are normal. There is no distension.      Palpations: Abdomen is soft.      Tenderness: There is no abdominal tenderness. There is no guarding.      Hernia: A hernia is present.      Comments: Large ventral hernia, reducible.   Genitourinary:     Comments: Deferred  Musculoskeletal:         General: No deformity.      Right lower leg: No edema.      Left lower leg: Edema present.   Skin:     General: Skin is warm and dry.      Capillary Refill: Capillary refill takes less than 2 seconds.      Coloration: Skin is not jaundiced or pale.      Findings: No erythema or rash.   Neurological:      General: No focal deficit present.      Mental Status: He is alert.      Comments: Oriented to self "   Psychiatric:         Mood and Affect: Mood normal.         Behavior: Behavior normal.         Lab  Lab Results   Component Value Date    WBC 6.90 03/02/2021    HGB 8.7 (L) 03/02/2021    HGB 7.2 (L) 03/01/2021    HGB 8.8 (L) 03/01/2021    MCV 90.1 03/02/2021     03/02/2021    INR 1.19 (H) 02/28/2021    INR 1.42 (H) 02/22/2021    INR 1.33 (H) 02/21/2021       Lab Results   Component Value Date    GLUCOSE 82 02/27/2021    BUN 10 02/27/2021    CREATININE 0.44 (L) 02/27/2021    EGFRIFNONA >150 02/27/2021    BCR 22.7 02/27/2021     02/27/2021    K 3.8 02/27/2021    CO2 28.0 02/27/2021    CALCIUM 8.0 (L) 02/27/2021    ALBUMIN 2.40 (L) 02/23/2021    ALKPHOS 66 02/23/2021    BILITOT 0.4 02/23/2021    ALT <5 02/23/2021    AST 11 02/23/2021       Assessment:    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  Stercoral ulcers in the rectum, 30% healed as compared to last week.  Rectal bleeding, resolved.  Acute blood loss anemia, stable after transfusion.    Plan:    >> Discontinue Anusol suppository.  >> Continue bowel regimen for constipation.    Patient is stable for discharge from GI standpoint.  Will sign off.  Please call with recurrent bleeding, questions, or concerns    Mark I. Brunner, MD  03/02/21  14:19 EST

## 2021-03-02 NOTE — PROGRESS NOTES
"Clinical Nutrition   Reason For Visit: Follow-up protocol    Patient Name: Amador White  YOB: 1935  MRN: [id number]  Date of Encounter: 03/02/21 10:08 EST  Admission date: 2/21/2021     Nutrition Assessment     Admission Problem List:  BRBPR/GIB  Abdominal pain    Additional conditions, tests, procedures  Diarrhea  Colitis  Proctitis  Symptomatic anemia  Large fecal burden  Oropharyngeal dysphagia  Metabolic encephalopathy  ?Aorta-enteric fistula  (2/23) NG tube placed for delivery of moviprep  (2/24) colonoscopy - diverticulosis, multiple stercoral ulcers; ventral hernia  Dysphagia  Chronic L IJ thrombus    (2/25) SLP eval, rec - mechanical soft with no mixed consistencies, nectar thick liquids, other (see comments)(consider making pt NPO if further concerns)  (2/26) SLP MBS rec, - puree, thin liquids, other (see comments)(comfort diet)  (3/1) sigmoidoscopy flexible - Flexible sigmoidoscopy reveals at least 30% healing of rectal stercoral ulcers    Applicable PMH:  HTN, HLD  CHF  Parkinson's disease  Hypothyroidism  Asthma  Bipolar  Depression  Diarrhea/Constipation  Dementia  Hand tremor  Pulmonary fibrosis      Reported/Observed/Food/Nutrition Related History      Patient alert and oriented to self. Still with some confusion, attempting to get out of bed per nursing. Patient consuming ~50% of PO meals provided. Comfort diet requested per family who does not desire artificial nutrition at this time. Per MD note 2/27 \"Spouse wishes to continue with comfort diet, declines Carlos feeds or permanent PEG tube at this time, although she states Mr. White is not eating much\"    Anthropometrics   Height: 68 in  Weight: 140 lbs (bed scale weight 2/24 per nsg doc)  BMI: 21.4  BMI classification: Normal: 18.5-24.9kg/m2   IBW: 154 lbs    Per RD note (2/22):  UBW: 158 lbs prior to recent hospital admissions per wife; per EMR pt weighed 160 lbs on (7/8/20 at [facility])  Weight change: Unintentional weight loss of 26 lbs " (16.5%) within the past couple of months. Exact timeframe not clear.    Labs reviewed   Pertinent: Yes    Medications reviewed   Pertinent: sinemet, folic acid, synthroid, protonix, veetid, miralax, seroquel  GTT: LR @ 20 mL/hr  PRN: ativan    Current Nutrition Prescription     PO: Diet Dysphagia; II - Pureed; Honey Thick; No Straws; GI Soft  Oral Nutrition Supplements: Boost Plus x2/day  Intake: 50% x 4 meals    Nutrition Diagnosis     2/22 updated 2/24, 2/26, 3/2  Problem Inadequate oral intake   Etiology Decreased appetite, food preferences, dysphagia; altered GI function requiring GI test   Signs/Symptoms Wife reports patient has been eating less than usual during the past 1.5-2 months; recent unintentional weight loss of 26 lbs (16.5%) within the past couple of months; NPO/Clear liquids x 3 days during this admission   Status: ongoing    Intervention   1.  Follow treatment progress, Care plan reviewed  2.  Cont to send oral nutrition supplement - Boost Plus x2/day  3. Per previous report, family pursuing comfort diet. No keofeed/artificial nutrition at this time. Clinical nutrition available pending changes in desired GOC    Goal:   General: Nutrition support treatment  PO: Honor food preferences  Additional goals: No further weight loss    Monitoring/Evaluation:   I&O, PO intake, Supplement intake, Pertinent labs, Weight, POC/GOC, Swallow function    Pastora Peguero RDN, LD  Time Spent: 25 minutes

## 2021-03-02 NOTE — PROGRESS NOTES
[facility] Medicine Services  PROGRESS NOTE    Patient Name: Amador White  : 1935  MRN: [id number]    Date of Admission: 2021  Primary Care Physician: Mandy Shahid MD    Subjective   Subjective     CC: f/u GIB    HPI: Up in bed with wife in room. Very minimal residual bleeding this am but otherwise doing much better. No pain.    ROS:  Gen- No fevers, chills  CV- No chest pain, palpitations  Resp- No cough, dyspnea  GI- No N/V/D, abd pain    Objective   Objective     Vital Signs:   Temp:  [97.6 °F (36.4 °C)-98.4 °F (36.9 °C)] 97.6 °F (36.4 °C)  Heart Rate:  [55-71] 59  Resp:  [16-22] 18  BP: (104-153)/(36-63) 114/49        Physical Exam:  Constitutional: No acute distress, awake, alert, looks better  HENT: NCAT, mucous membranes moist  Respiratory: Clear to auscultation bilaterally, respiratory effort normal   Cardiovascular: RRR, no murmurs, rubs, or gallops  Gastrointestinal: Positive bowel sounds, soft, nontender, nondistended  Musculoskeletal: No bilateral ankle edema  Psychiatric: Masked facies  Neurologic: Interactive, mostly appropriate, moves extremities equally.  Skin: No rashes    Results Reviewed:  Results from last 7 days   Lab Units 21  0910 21  2357 21  1735  21  2102 21  0449   WBC 10*3/mm3 6.90  --   --   --  9.40 5.88   HEMOGLOBIN g/dL 8.7* 7.2* 8.8*   < > 8.6* 8.4*   HEMATOCRIT % 27.4* 23.9* 29.1*   < > 28.3* 28.7*   PLATELETS 10*3/mm3 146  --   --   --  148 131*   INR   --   --   --   --   --  1.19*    < > = values in this interval not displayed.     Results from last 7 days   Lab Units 21  0444 21  0757 21  2241 21  0842   SODIUM mmol/L 144 149*  --  148*   POTASSIUM mmol/L 3.8 4.3 3.3* 3.1*   CHLORIDE mmol/L 112* 116*  --  113*   CO2 mmol/L 28.0 27.0  --  28.0   BUN mg/dL 10 14  --  19   CREATININE mg/dL 0.44* 0.46*  --  0.54*   GLUCOSE mg/dL 82 85  --  69   CALCIUM mg/dL 8.0* 8.0*  --  8.1*          Estimated Creatinine Clearance: 68.6 mL/min (A) (by C-G formula based on SCr of 0.44 mg/dL (L)).    Microbiology Results Abnormal     Procedure Component Value - Date/Time    Blood Culture - Blood, Hand, Left [[id number]] Collected: 02/22/21 0321    Lab Status: Final result Specimen: Blood from Hand, Left Updated: 02/27/21 0400     Blood Culture No growth at 5 days      Aerobic bottle only    Blood Culture - Blood, Hand, Right [[id number]] Collected: 02/22/21 0321    Lab Status: Final result Specimen: Blood from Hand, Right Updated: 02/27/21 0400     Blood Culture No growth at 5 days      Aerobic bottle only    Urine Culture - Urine, Urine, Catheter [[id number]]  (Normal) Collected: 02/21/21 1741    Lab Status: Final result Specimen: Urine, Catheter Updated: 02/23/21 0823     Urine Culture No growth    Clostridium Difficile Toxin - Stool, Per Rectum [[id number]]  (Normal) Collected: 02/22/21 0321    Lab Status: Final result Specimen: Stool from Per Rectum Updated: 02/22/21 0719    Narrative:      The following orders were created for panel order Clostridium Difficile Toxin - Stool, Per Rectum.  Procedure                               Abnormality         Status                     ---------                               -----------         ------                     Clostridium Difficile To...[[id number]]  Normal              Final result                 Please view results for these tests on the individual orders.    Clostridium Difficile Toxin, PCR - Stool, Per Rectum [[id number]]  (Normal) Collected: 02/22/21 0321    Lab Status: Final result Specimen: Stool from Per Rectum Updated: 02/22/21 0719     C. Difficile Toxins by PCR Not Detected    Narrative:      Performance characteristics of test not established for patients <2 years of age.  Negative for Toxigenic C. Difficile    Respiratory Panel PCR w/COVID-19(SARS-CoV-2) ZAHEER/ALFONSO/EMILIANO/PAD/COR/MAD/HOLLIE In-House, NP Swab in UTM/VTM, 3-4 HR TAT - Swab, Nasopharynx  [[id number]]  (Normal) Collected: 02/21/21 1741    Lab Status: Final result Specimen: Swab from Nasopharynx Updated: 02/21/21 1850     ADENOVIRUS, PCR Not Detected     Coronavirus 229E Not Detected     Coronavirus HKU1 Not Detected     Coronavirus NL63 Not Detected     Coronavirus OC43 Not Detected     COVID19 Not Detected     Human Metapneumovirus Not Detected     Human Rhinovirus/Enterovirus Not Detected     Influenza A PCR Not Detected     Influenza B PCR Not Detected     Parainfluenza Virus 1 Not Detected     Parainfluenza Virus 2 Not Detected     Parainfluenza Virus 3 Not Detected     Parainfluenza Virus 4 Not Detected     RSV, PCR Not Detected     Bordetella pertussis pcr Not Detected     Bordetella parapertussis PCR Not Detected     Chlamydophila pneumoniae PCR Not Detected     Mycoplasma pneumo by PCR Not Detected    Narrative:      Fact sheet for providers: https://[email or web address]/wp-content/uploads/IDI7899-9277-AD7.1-EUA-Provider-Fact-Sheet-3.pdf    Fact sheet for patients: https://[email or web address]/wp-content/uploads/ERF1432-9683-RW2.1-EUA-Patient-Fact-Sheet-1.pdf    Test performed by PCR.               I have reviewed the medications:  Scheduled Meds:atorvastatin, 10 mg, Oral, Nightly  budesonide-formoterol, 2 puff, Inhalation, BID - RT  carbidopa-levodopa, 2 tablet, Oral, TID  Divalproex Sodium, 125 mg, Oral, Q6H  escitalopram, 20 mg, Oral, Daily  folic acid, 1 mg, Oral, Daily  levothyroxine, 75 mcg, Oral, Q AM  nystatin, , Topical, Q12H  pantoprazole, 40 mg, Intravenous, Q12H  penicillin v potassium, 500 mg, Oral, Q12H  polyethylene glycol, 17 g, Oral, Daily  pramipexole, 0.25 mg, Oral, Daily  QUEtiapine, 12.5 mg, Oral, Nightly  sodium chloride, 10 mL, Intravenous, Q12H  topiramate, 100 mg, Oral, Daily      Continuous Infusions:lactated ringers, 9 mL/hr, Last Rate: 9 mL/hr (02/24/21 1024)  lactated ringers, 20 mL/hr, Last Rate: 20 mL/hr (03/01/21 6662)      PRN Meds:.•  acetaminophen  •   albuterol  •  hydrALAZINE  •  potassium chloride **OR** potassium chloride **OR** potassium chloride  •  sodium chloride  •  sodium chloride    Assessment/Plan   Assessment & Plan     Active Hospital Problems    Diagnosis  POA   • **Colitis [K52.9]  Yes   • Acute lower GI bleeding [K92.2]  Unknown   • CHF (congestive heart failure) (CMS/[facility]) [I50.9]  Yes   • Bipolar 1 disorder (CMS/[facility]) [F31.9]  Yes   • Dementia (CMS/[facility]) [F03.90]  Yes   • Parkinson's disease (CMS/[facility]) [G20]  Yes   • Depression [F32.9]  Yes   • Hypertension [I10]  Yes   • Chronic asthma [J45.909]  Yes   • H/O aortic aneurysm repair [Z98.890, Z86.79]  Not Applicable      Resolved Hospital Problems   No resolved problems to display.        Brief Hospital Course to date:  Amador White is a 85 y.o. male with history of Parkinson's disease, dementia, hypertension AAA, CHF, recent hospitalization for persistent fever suspected secondary to UTI/pneumonia currently on IV antibiotics.  Patient presents to the ED with BRBPR found to have colitis/proctitis, admitted with GI bleed secondary to stercoral ulcers. Had recurrent bleeding 3/1.      Rectal bleeding, recurrent  Acute blood loss anemia  Dieulafoy lesion  -GI following, he is s/p C scope 2/25 with findings of multiple stercoral ulcers, likely a sequela of his recent severe constipation. Repeat flex sig 3/1 showed improvement in stercoral ulcers but with Dieulafoy lesion which was clipped and was likely source of bleeding.  -H/H stable.  -Continue to hold asa  -CBC in am.     Proctocolitis  -CT abdomen pelvis does reveal marked wall thickening and edema of sigmoid colon and rectum with severe fecal loading, concerning for colitis and proctitis, C. difficile toxin is negative  -Continue antibiotics, currently on  Zosyn per ID  -Pathology from colonoscopy concerning for fragments of tubular adenoma-GI recommends surveillance flex sig in 3 months to evaluate healing of the ulcers and evaluate for carpet  polyp     Recent enterococcal septicemia  -Patient recently hospitalized at [facility], discharged on IV ampicillin,  -Discussed with Dr. 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  -Continue Zosyn for now, changed to oral penicillin based antibiotic once IV antibiotics complete     Thrombocytopenia, Coagulopathy  -Suspect malnutrition. Received s/p Vitamin K x1 w/ improvement in INR.  -TCP due to blood loss, better. Monitor.     Chronic left IJ thrombus  -Suspect secondary to recent PICC line in the left arm (in place for 6 weeks, now removed).  Given that the thrombus appears chronic, combined with Mr White's recent GI bleeding, ongoing anemia and thrombocytopenia, will defer anticoagulation at present as the risks appear to outweigh benefits. My partner discussed with patient and spouse at bedside, they are agreeable with this plan.  Hold asa as above.     Hypertension  -Hold propranolol until bleeding has subsided. Will start IR propanolol when able to resume.     Hyperlipidemia-continue statin     Hypothyroidism-baseline TSH wnl, continue Synthroid     Hypokalemia and hypocalcemia-continue to monitor and replete per protocol     Abdominal aortic aneurysm-measuring 3.7 cm per current CT, seems to be chronic, per daughter he is s/p repair in 1993.     Parkinson's disease, dementia  -Progressive, continue carbidopa-levodopa     Depression and bipolar disorder  -Continue divalproex, escitalopram and topiramate     Prolonged QTC  -     Dysphagia  -patient with some aspiration of milk this morning at breakfast. Discussed ongoing risk of aspiration from dysphagia with both the patient and his wife this morning at bedside.  We again considered the possibility of a PEG tube placement.  At this time Mr. White's wife, who makes medical decisions for him, feels that withholding food and instead giving enteral feeds would remove  the great robert in life that eating often provides.  She would prefer to continue to a modified diet with the hope that soft textures and thickened liquids will lessen the risk of aspiration and pneumonia-but understands that according to speech therapy, the patient aspirated with all consistencies.  My partner did briefly touch on larger goals of care and I did mention that  and Mrs. White might benefit from the services of the palliative care team. Once bleeding episode has resolved will plan to continue ongoing discussions.     Unintentional weight loss  - 20 pound weight loss since December.  Asked dietitian to see-patient may not be consuming adequate calories at present.      This patient's problems and plans were partially entered by my partner and updated as appropriate by me 03/02/21.     DVT Prophylaxis: Mechanical     Disposition: To SNF in 1-2 days.    CODE STATUS:   Code Status and Medical Interventions:   Ordered at: 02/21/21 4910     Level Of Support Discussed With:    Health Care Surrogate     Code Status:    CPR     Medical Interventions (Level of Support Prior to Arrest):    Full       Naomie Rivero II, DO  03/02/21

## 2021-03-02 NOTE — PLAN OF CARE
Goal Outcome Evaluation:  Plan of Care Reviewed With: (P) patient  Progress: (P) no change  Outcome Summary: (P) Pt alert & disoriented to person, place, situation, and time. Pt was limited in session d/t confusion and deficits in command following. Pt perform bed mobility with Max x2. Pt transfers with Max x2 and unable to follow verbal and tactile cuing for R knee flexion. Pt washed face with CGA. Pt was dependent for UBD and perineal care.  Recommend d/c to SNF.

## 2021-03-02 NOTE — THERAPY TREATMENT NOTE
Patient Name: Amador White  : 1935    MRN: [id number]                              Today's Date: 3/2/2021       Admit Date: 2021    Visit Dx:     ICD-10-CM ICD-9-CM   1. Sepsis, due to unspecified organism, unspecified whether acute organ dysfunction present (CMS/[facility])  A41.9 038.9     995.91   2. Acute lower GI bleeding  K92.2 578.9   3. Anemia, unspecified type  D64.9 285.9   4. Dysphagia, unspecified type  R13.10 787.20   5. Ulcer of rectum  K62.6 569.41     Patient Active Problem List   Diagnosis   • Chronic fibrosis of lung (CMS/[facility])   • Chronic asthma   • Allergic rhinitis   • Hypertension   • Migraine   • Left Diaphragmatic paralysis/postop   • Bronchiectasis without complication (CMS/[facility])   • Colitis   • CHF (congestive heart failure) (CMS/[facility])   • Bipolar 1 disorder (CMS/[facility])   • Dementia (CMS/[facility])   • Parkinson's disease (CMS/[facility])   • H/O aortic aneurysm repair   • Depression   • Acute lower GI bleeding     Past Medical History:   Diagnosis Date   • Arthritis    • Asthma    • Bipolar 1 disorder (CMS/[facility])    • CHF (congestive heart failure) (CMS/[facility])    • COPD (chronic obstructive pulmonary disease) (CMS/[facility])    • Dementia (CMS/[facility])    • Depression    • Disease of thyroid gland    • H/O chest x-ray 2012    Left diaphragm to be elevated chronically. There is evidence of previous surgery on left with line of clips along the left hilum. There is large thoracic aortic aneurysm overlying the chest. There is a graft into the left hilum and a clip in left paratracheal region. There are mirco calcifications in right base. Apices appear hyperlucent, right more so that left. I cannot rule out bullous disease   • Hyperlipidemia    • Hypertension    • Insomnia    • Parkinson's disease (CMS/[facility])    • RLS (restless legs syndrome)    • Tremor of hands and face     Follwed by Dr. Dumont     Past Surgical History:   Procedure Laterality Date   • ABDOMINAL AORTIC ANEURYSM REPAIR         • CATARACT  EXTRACTION, BILATERAL  2017   • CHOLECYSTECTOMY     • COLONOSCOPY N/A 2/24/2021    Procedure: COLONOSCOPY;  Surgeon: Brunner, Mark I, MD;  Location:  [facility];  Service: Gastroenterology;  Laterality: N/A;   • EYE SURGERY     • LARYNX SURGERY     • SIGMOIDOSCOPY N/A 3/1/2021    Procedure: SIGMOIDOSCOPY FLEXIBLE;  Surgeon: Brunner, Mark I, MD;  Location:  [facility];  Service: Gastroenterology;  Laterality: N/A;   • TONSILLECTOMY AND ADENOIDECTOMY       General Information     Row Name 03/02/21 1443          Physical Therapy Time and Intention    Document Type  therapy note (daily note)  -LO     Mode of Treatment  physical therapy  -LO     Row Name 03/02/21 1443          General Information    Patient Profile Reviewed  yes  -LO     Existing Precautions/Restrictions  fall  -LO     Row Name 03/02/21 1443          Cognition    Orientation Status (Cognition)  oriented to;person  -LO     Row Name 03/02/21 1443          Safety Issues, Functional Mobility    Impairments Affecting Function (Mobility)  balance;cognition;coordination;endurance/activity tolerance;motor control;motor planning;postural/trunk control;strength  -LO       User Key  (r) = Recorded By, (t) = Taken By, (c) = Cosigned By    Initials Name Provider Type    LO Ashleigh Murrell PT Physical Therapist        Mobility     Row Name 03/02/21 1444          Bed Mobility    Bed Mobility  scooting/bridging;supine-sit;sit-supine;rolling left;rolling right  -LO     Rolling Left [place] (Bed Mobility)  moderate assist (50% patient effort);2 person assist;verbal cues;nonverbal cues (demo/gesture)  -LO     Rolling Right [place] (Bed Mobility)  moderate assist (50% patient effort);verbal cues;nonverbal cues (demo/gesture)  -LO     Scooting/Bridging [place] (Bed Mobility)  maximum assist (25% patient effort);2 person assist;verbal cues;nonverbal cues (demo/gesture)  -LO     Supine-Sit [place] (Bed Mobility)  moderate assist (50% patient  effort);nonverbal cues (demo/gesture);verbal cues;2 person assist  -     Assistive Device (Bed Mobility)  draw sheet;head of bed elevated  -     Comment (Bed Mobility)  Patient requires modA x 2 with bed mobility, vc for hand placement. Very little assist requried at trunk  -     Row Name 03/02/21 1444          Transfers    Comment (Transfers)  maxAx2 for sit<>stand transfer using FWW. Unable to follow through with vc or tactile cuing for B knee flexion prior to standing.  -     Row Name 03/02/21 1444          Bed-Chair Transfer    Bed-Chair [place] (Transfers)  maximum assist (25% patient effort);verbal cues;nonverbal cues (demo/gesture);2 person assist  -     Assistive Device (Bed-Chair Transfers)  other (see comments) BUE support  -     Row Name 03/02/21 1444          Sit-Stand Transfer    Sit-Stand [place] (Transfers)  verbal cues;maximum assist (25% patient effort);2 person assist  -     Assistive Device (Sit-Stand Transfers)  walker, front-wheeled  -     Row Name 03/02/21 1444          Gait/Stairs (Locomotion)    Comment (Gait/Stairs)  Unable to achieve safe standing postion with BLE under hips so gait not attempted  -       User Key  (r) = Recorded By, (t) = Taken By, (c) = Cosigned By    Initials Name Provider Type    Ashleigh Regalado PT Physical Therapist        Obj/Interventions     Row Name 03/02/21 1448          Motor Skills    Therapeutic Exercise  hip;knee;ankle;shoulder;other (see comments) forward trunk flexion in sitting x5  -     Row Name 03/02/21 1448          Shoulder (Therapeutic Exercise)    Shoulder (Therapeutic Exercise)  AROM (active range of motion)  -     Shoulder AROM (Therapeutic Exercise)  bilateral;flexion  -     Row Name 03/02/21 1448          Hip (Therapeutic Exercise)    Hip (Therapeutic Exercise)  AROM (active range of motion)  -     Hip AROM (Therapeutic Exercise)  bilateral;flexion;10 repetitions  -     Row Name 03/02/21 1448          Knee  (Therapeutic Exercise)    Knee (Therapeutic Exercise)  AROM (active range of motion)  -LO     Knee AROM (Therapeutic Exercise)  LAQ (long arc quad);sitting;10 repetitions  -LO     Knee Strengthening (Therapeutic Exercise)  marching while seated;LAQ (long arc quad);10 repetitions  -LO     Row Name 03/02/21 1448          Balance    Balance Assessment  sitting static balance;sitting dynamic balance;standing static balance;standing dynamic balance  -LO     Static Sitting Balance  WNL;supported;sitting in chair  -LO     Dynamic Sitting Balance  WNL;supported;sitting in chair  -LO     Static Standing Balance  moderate impairment;supported;standing  -LO     Dynamic Standing Balance  severe impairment;supported;standing  -LO     Comment, Balance  requires BUE support and modAx2 for maintaining balance in standing  -LO       User Key  (r) = Recorded By, (t) = Taken By, (c) = Cosigned By    Initials Name Provider Type    Ashleigh Regalado, PT Physical Therapist        Goals/Plan    No documentation.       Clinical Impression     Row Name 03/02/21 1451          Pain Scale: Numbers Pre/Post-Treatment    Pretreatment Pain Rating  0/10 - no pain  -LO     Posttreatment Pain Rating  0/10 - no pain  -LO     Row Name 03/02/21 1451          Plan of Care Review    Plan of Care Reviewed With  patient  -LO     Progress  improving  -     Outcome Summary  Patient alert and pleasently confused, very motivated for therapy. Mild improvements noted in functional mobility. Performs bed mobility with modA x2. Performs sit<>stand transfer with FWW x2 attempts with maxAx2 more likely due to motor control deficit vs strength. Notable difficulty following cuing for sequencing. Performs sit>pivot transfer EOB >recliner with maxA x2. Strong effort with all TE in sitting. Cont to recommend IPR at dc.  -     Row Name 03/02/21 1453          Therapy Assessment/Plan (PT)    Rehab Potential (PT)  good, to achieve stated therapy goals  -     Criteria for  Skilled Interventions Met (PT)  yes;meets criteria  -LO     Row Name 03/02/21 1451          Vital Signs    O2 Delivery Pre Treatment  room air  -LO     O2 Delivery Intra Treatment  room air  -LO     O2 Delivery Post Treatment  room air  -LO     Pre Patient Position  Supine  -LO     Intra Patient Position  Standing  -LO     Post Patient Position  Sitting  -LO     Row Name 03/02/21 1451          Positioning and Restraints    Pre-Treatment Position  in bed  -LO     Post Treatment Position  chair  -LO     In Chair  notified nsg;reclined;sitting;call light within reach;encouraged to call for assist;exit alarm on;waffle cushion;on mechanical lift sling  -LO       User Key  (r) = Recorded By, (t) = Taken By, (c) = Cosigned By    Initials Name Provider Type    Ashleigh Regalado, KATIE Physical Therapist        Outcome Measures     Row Name 03/02/21 1455          How much help from another person do you currently need...    Turning from your back to your side while in flat bed without using bedrails?  2  -LO     Moving from lying on back to sitting on the side of a flat bed without bedrails?  2  -LO     Moving to and from a bed to a chair (including a wheelchair)?  2  -LO     Standing up from a chair using your arms (e.g., wheelchair, bedside chair)?  2  -LO     Climbing 3-5 steps with a railing?  1  -LO     To walk in hospital room?  1  -LO     AM-PAC 6 Clicks Score (PT)  10  -LO       User Key  (r) = Recorded By, (t) = Taken By, (c) = Cosigned By    Initials Name Provider Type    Ashleigh Regalado, KATIE Physical Therapist        Physical Therapy Education                 Title: PT OT SLP Therapies (In Progress)     Topic: Physical Therapy (In Progress)     Point: Mobility training (In Progress)     Learning Progress Summary           Patient Acceptance, E, VU,NR by FABI at 3/2/2021 1440    Comment: Patient education on sequencing for bed mobility and cuing    Acceptance, E,TB, VU by RAUDEL at 2/28/2021 0656    Acceptance, E, NR,DU by BRIDGETTE  at 2/26/2021 1420    Acceptance, E, NR by AS at 2/25/2021 0914    Acceptance, E, NR by VG at 2/22/2021 1120   Family Acceptance, E, NR,DU by BRIDGETTE at 2/26/2021 1420   Significant Other Acceptance, E, NR by VG at 2/22/2021 1120                   Point: Home exercise program (Done)     Learning Progress Summary           Patient Acceptance, E,TB, VU by KR at 2/28/2021 0656    Acceptance, E, NR,DU by BRIDGETTE at 2/26/2021 1420    Acceptance, E, NR by AS at 2/25/2021 0914   Family Acceptance, E, NR,DU by BRIDGETTE at 2/26/2021 1420                   Point: Body mechanics (In Progress)     Learning Progress Summary           Patient Acceptance, E,TB, VU by KR at 2/28/2021 0656    Acceptance, E, NR,DU by BRIDGETTE at 2/26/2021 1420    Acceptance, E, NR by AS at 2/25/2021 0914    Acceptance, E, NR by VG at 2/22/2021 1120   Family Acceptance, E, NR,DU by BRIDGETTE at 2/26/2021 1420   Significant Other Acceptance, E, NR by VG at 2/22/2021 1120                   Point: Precautions (In Progress)     Learning Progress Summary           Patient Acceptance, E,TB, VU by KR at 2/28/2021 0656    Acceptance, E, NR,DU by BRIDGETTE at 2/26/2021 1420    Acceptance, E, NR by AS at 2/25/2021 0914    Acceptance, E, NR by VG at 2/22/2021 1120   Family Acceptance, E, NR,DU by BRIDGETTE at 2/26/2021 1420   Significant Other Acceptance, E, NR by VG at 2/22/2021 1120                               User Key     Initials Effective Dates Name Provider Type Discipline    AS 06/22/15 -  Pauline Chew, PTA Physical Therapy Assistant PT    VG 05/29/18 -  Kendal Jarrell, PT Physical Therapist PT    BRIDGETTE 08/30/18 -  Megan Shell, PT Physical Therapist PT    KR 01/04/21 -  Miguelina Eugene, RN Registered Nurse Nurse    LO 03/11/20 -  Ashleigh Murrell, PT Physical Therapist PT              PT Recommendation and Plan     Plan of Care Reviewed With: patient  Progress: improving  Outcome Summary: Patient alert and pleasently confused, very motivated for therapy. Mild improvements noted in functional  mobility. Performs bed mobility with modA x2. Performs sit<>stand transfer with FWW x2 attempts with maxAx2 more likely due to motor control deficit vs strength. Notable difficulty following cuing for sequencing. Performs sit>pivot transfer EOB >recliner with maxA x2. Strong effort with all TE in sitting. Cont to recommend IPR at dc.     Time Calculation:   PT Charges     Row Name 03/02/21 1440             Time Calculation    Start Time  1440  -LO      PT Received On  03/02/21  -LO      PT Goal Re-Cert Due Date  03/04/21  -LO         Timed Charges    98817 - PT Therapeutic Exercise Minutes  18  -LO      05217 - PT Therapeutic Activity Minutes  15  -LO        User Key  (r) = Recorded By, (t) = Taken By, (c) = Cosigned By    Initials Name Provider Type    LO Ashleigh Murrell, PT Physical Therapist        Therapy Charges for Today     Code Description Service Date Service Provider Modifiers Qty    52023458338 HC PT THER PROC EA 15 MIN 3/2/2021 Ashleigh Murrell, PT GP 1    57490952314 HC PT THERAPEUTIC ACT EA 15 MIN 3/2/2021 Ashleigh Murrell, PT GP 1    83132129478 HC PT THER SUPP EA 15 MIN 3/2/2021 Ashleigh Murrell, PT GP 1          PT G-Codes  Outcome Measure Options: AM-PAC 6 Clicks Basic Mobility (PT)  AM-PAC 6 Clicks Score (PT): 10  AM-PAC 6 Clicks Score (OT): 12    Ashleigh Murrell PT  3/2/2021

## 2021-03-02 NOTE — PROGRESS NOTES
Case Management Discharge Note      Final Note: Per Olivia at [facility], they can accept Mr. White back into a skilled rehab bed tomorrow if medically ready. I have tentatively scheduled [facility] ambulance service to transport him at 11:00. Nurse will need to call report to [phone].     I have discussed this with his wife Sandra. She is agreeable to this plan.         Selected Continued Care - Admitted Since 2/21/2021     Destination Coordination complete    Service Provider Selected Services Address Phone Fax Patient Preferred    [facility]  Skilled Nursing [address] [address] [phone] --          Durable Medical Equipment    No services have been selected for the patient.              Dialysis/Infusion    No services have been selected for the patient.              Home Medical Care    No services have been selected for the patient.              Therapy    No services have been selected for the patient.              Community Resources    No services have been selected for the patient.                  Transportation Services  Ambulance: [facility]    Final Discharge Disposition Code: 03 - skilled nursing facility (SNF)

## 2021-03-03 NOTE — CONSULTS
Stroke Consult Note    Patient Name: Amador White   MRN: [id number]  Age: 85 y.o.  Sex: male  : 1935    Primary Care Physician: Mandy Shahid MD  Referring Physician: Dr. Rivero    TIME STROKE TEAM CALLED: 03:50 EST     TIME PATIENT SEEN: 03:55 EST    Race:     Chief Complaint/Reason for Consultation: decreased LOC, aphasia, left facial droop, left sided weakness    Subjective .  HPI: Amador White is an 85-year-old,  male whom I am evaluating for acute stroke symptoms.  Bedside nurse reports patient LKW 0200.  At approximately 0345 patient was found with decreased LOC, eye deviation, left-sided weakness, and left-sided facial droop code stroke was called.  Upon my exam patient is drowsy, he is only able to tell me his name.  Speech is dysarthric, patient makes little attempt at speech.  Does follow commands with encouragement.  Left facial droop.  Eyes are deviated to the right, PERRLA, visual fields appear to be intact to visual threat.  Patient does have drift in bilateral upper extremities, worse on the left.  He does have minimal spontaneous movement with no antigravity strength of bilateral lower extremities, I am told this is baseline.      Mr. White has a known diagnosis of HTN, HLD, AAA s/p repair (), Parkinson's, dementia, CHF, hypothyroidism, depression and bipolar disorder who was admitted to [facility] on 2021 with GI bleed secondary to stercoral ulcers, and toxic/metabolic encephalopathy. 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  Patient is also receiving antibiotics for proctocolitis, pathology concerning for fragments of tubular adenoma. Patient has had multiple admissions at [facility] for suspected UTI/PNA, last 2020.  He is followed by ID and has been receiving treatment for presumed endovascular infection with Enterococcus.  Of note, patient does have a chronic left IJ thrombus likely secondary to recent PICC line in the left arm.   Not anticoagulated due to patient's ongoing anemia and thrombocytopenia.  Additionally patient has had ongoing problems with dysphagia, PEG tube placement had been considered but patient's wife prefers to continue with a modified diet to hopefully give patient's greater quality of life.  Patient has been a resident of [facility] for the past couple of months.  At baseline he is oriented only to self, though he does follow commands.  At baseline patient has equal upper extremity strength, he is very weak in his lower extremities at baseline and is not ambulatory.     *IV access obtained and patient taking emergently for CT scan      Last Known Normal Date/Time: 0200 EST     Review of Systems   Unable to perform ROS: Mental status change      Past Medical History:   Diagnosis Date   • Arthritis    • Asthma    • Bipolar 1 disorder (CMS/HCC)    • CHF (congestive heart failure) (CMS/HCC)    • COPD (chronic obstructive pulmonary disease) (CMS/HCC)    • Dementia (CMS/HCC)    • Depression    • Disease of thyroid gland    • H/O chest x-ray 08/08/2012    Left diaphragm to be elevated chronically. There is evidence of previous surgery on left with line of clips along the left hilum. There is large thoracic aortic aneurysm overlying the chest. There is a graft into the left hilum and a clip in left paratracheal region. There are mirco calcifications in right base. Apices appear hyperlucent, right more so that left. I cannot rule out bullous disease   • Hyperlipidemia    • Hypertension    • Insomnia    • Parkinson's disease (CMS/HCC)    • RLS (restless legs syndrome)    • Tremor of hands and face     Follwed by Dr. Dumont     Past Surgical History:   Procedure Laterality Date   • ABDOMINAL AORTIC ANEURYSM REPAIR      1993   • CATARACT EXTRACTION, BILATERAL  2017   • CHOLECYSTECTOMY     • COLONOSCOPY N/A 2/24/2021    Procedure: COLONOSCOPY;  Surgeon: Brunner, Mark I, MD;  Location: [facility] ENDOSCOPY;  Service: Gastroenterology;   Laterality: N/A;   • EYE SURGERY     • LARYNX SURGERY     • SIGMOIDOSCOPY N/A 3/1/2021    Procedure: SIGMOIDOSCOPY FLEXIBLE;  Surgeon: Brunner, Mark I, MD;  Location: [facility] ENDOSCOPY;  Service: Gastroenterology;  Laterality: N/A;   • TONSILLECTOMY AND ADENOIDECTOMY       Family History   Problem Relation Age of Onset   • Heart failure Mother         Congestive   • Heart disease Father         CAD   • Asthma Daughter         history of allergies and asthma   • Aneurysm Sister    • Cancer Sister         colon     Social History     Socioeconomic History   • Marital status:      Spouse name: Not on file   • Number of children: Not on file   • Years of education: Not on file   • Highest education level: Not on file   Tobacco Use   • Smoking status: Former Smoker     Packs/day: 0.50     Years: 10.00     Pack years: 5.00     Types: Cigarettes     Quit date:      Years since quittin.2   • Smokeless tobacco: Never Used   Substance and Sexual Activity   • Alcohol use: No   • Drug use: No   • Sexual activity: Defer     Allergies   Allergen Reactions   • Zolpidem Hallucinations     Prior to Admission medications    Medication Sig Start Date End Date Taking? Authorizing Provider   albuterol (VENTOLIN HFA) 108 (90 Base) MCG/ACT inhaler Inhale 2 puffs Every 6 (Six) Hours As Needed for Wheezing or Shortness of Air. 18   Case, Kailyn ISLAS, DO   aspirin 81 MG tablet aspirin 81 mg tablet   Daily    ProviderSb MD   carbidopa-levodopa (SINEMET)  MG per tablet Take 2 tablets by mouth 3 (three) times a day. 16   Sb Garnica MD   cyclobenzaprine (FLEXERIL) 5 MG tablet As Needed. 18   Sb Garnica MD   divalproex (DEPAKOTE) 500 MG 24 hr tablet Take 1 tablet by mouth daily. 16   Sb Garnica MD   escitalopram (LEXAPRO) 20 MG tablet Take 1 tablet by mouth daily. 16   Sb Garnica MD   fluticasone-salmeterol (ADVAIR) 250-50 MCG/DOSE DISKUS Inhale 1  puff 2 (Two) Times a Day. 11/18/20   Jade Duggan APRN   gabapentin (NEURONTIN) 300 MG capsule Take 10 capsules by mouth daily. 9/8/16   Sb Garnica MD   levothyroxine (SYNTHROID, LEVOTHROID) 75 MCG tablet Take 75 mcg by mouth Daily. 1/30/18   Sb Garnica MD   pramipexole (MIRAPEX) 0.25 MG tablet Take 0.25 mg by mouth Daily. 3/19/18   Sb Garnica MD   propranolol LA (INDERAL LA) 120 MG 24 hr capsule Take 1 capsule by mouth daily. 7/31/16   Sb Garnica MD   simvastatin (ZOCOR) 20 MG tablet Take 0.5 tablets by mouth daily. 9/1/16   Sb Garnica MD   SUMAtriptan (IMITREX) 100 MG tablet as needed. 8/30/16   Sb Garnica MD   topiramate (TOPAMAX) 100 MG tablet Take 100 mg by mouth Daily. 4/2/18   Sb Garnica MD             Objective     Temp:  [97.6 °F (36.4 °C)-97.9 °F (36.6 °C)] 97.7 °F (36.5 °C)  Heart Rate:  [56-72] 69  Resp:  [15-22] 15  BP: (114-142)/(49-75) 124/62  Neurological Exam  Mental Status  Drowsy. Oriented only to person. Mild dysarthria present. Expressive aphasia present.    Cranial Nerves  CN II: Visual fields full to confrontation. To visual threat.  CN III, IV, VI: Abnormal extraocular movements: Eyes are deviated right and do not cross midline. Normal lids and orbits bilaterally. Pupils equal round and reactive to light bilaterally.  CN V: Facial sensation is normal.  CN VII:  Right: There is no facial weakness.  Left: There is central facial weakness.  CN XII: Tongue midline without atrophy or fasciculations.    Motor  Normal muscle bulk throughout. No fasciculations present. Normal muscle tone. The following abnormal movements were seen: Tremor of bilateral upper extremities. Strength is 5/5 in all four extremities except as noted.  RUE strength 4/5, LUE strength 3/5, BLE strength 1/5.    Sensory  Light touch abnormality:   Decreased sensation to light touch on left upper and lower extremities.    Coordination  Unable to  assess secondary to mental status.    Gait  Not tested.      Physical Exam  Vitals signs reviewed.   Constitutional:       Appearance: He is ill-appearing.   HENT:      Head: Normocephalic and atraumatic.      Mouth/Throat:      Mouth: Mucous membranes are dry.   Eyes:      General: Lids are normal.      Extraocular Movements: EOM normal     Pupils: Pupils are equal, round, and reactive to light.   Neck:      Musculoskeletal: Normal range of motion and neck supple.   Cardiovascular:      Rate and Rhythm: Normal rate. Rhythm irregular.   Pulmonary:      Effort: Pulmonary effort is normal. No respiratory distress.   Abdominal:      General: There is no distension.      Palpations: Abdomen is soft.   Musculoskeletal:         General: No swelling or tenderness.   Skin:     General: Skin is warm and dry.      Coloration: Skin is pale.   Neurological:      Cranial Nerves: Cranial nerve deficit and dysarthria present.      Sensory: Sensory deficit present.      Motor: Weakness present.   Psychiatric:      Comments: Seems to have some visual hallucination, pointing at things around the room with his right hand         Acute Stroke Data    Alteplase (tPA) Inclusion / Exclusion Criteria    Time: 04:11 EST  Person Administering Scale: PETEY Persaud    Inclusion Criteria  [x]   18 years of age or greater   [x]   Onset of symptoms < 4.5 hours before beginning treatment (stroke onset = time patient was last seen well or without symptoms).   [x]   Diagnosis of acute ischemic stroke causing measurable disabling deficit (Complete Hemianopia, Any Aphasia, Visual or Sensory Extinction, Any weakness limiting sustained effort against gravity)   []   Any remaining deficit considered potentially disabling in view of patient and practitioner   Exclusion criteria (Do not proceed with Alteplase if any are checked under exclusion criteria)  []   Onset unknown or GREATER than 4.5 hours   []   ICH on CT/MRI   []   CT demonstrates  hypodensity representing acute or subacute infarct   []   Significant head trauma or prior stroke in the previous 3 months   []   Symptoms suggestive of subarachnoid hemorrhage   []   History of un-ruptured intracranial aneurysm GREATER than 10 mm   []   Recent intracranial or intraspinal surgery within the last 3 months   []   Arterial puncture at a non-compressible site in the previous 7 days   []   Active internal bleeding   []   Acute bleeding tendency   []   Platelet count LESS than 100,000 for known hematological diseases such as leukemia, thrombocytopenia or chronic cirrhosis   []   Current use of anticoagulant with INR GREATER than 1.7 or PT GREATER than 15 seconds, aPTT GREATER than 40 seconds   []   Heparin received within 48 hours, resulting in abnormally elevated aPTT GREATER than upper limit of normal   []   Current use of direct thrombin inhibitors or direct factor Xa inhibitors in the past 48 hours   []   Elevated blood pressure refractory to treatment (systolic GREATER than 185 mm/Hg or diastolic  GREATER than 110 mm/Hg   []   Suspected infective endocarditis and aortic arch dissection   []   Current use of therapeutic treatment dose of low-molecular-weight heparin (LMWH) within the previous 24 hours   [x]   Structural GI malignancy or bleed   Relative exclusion for all patients  []   Only minor non-disabling symptoms   []   Pregnancy   []   Seizure at onset with postictal residual neurological impairments   []   Major surgery or previous trauma within past 14 days   []   History of previous spontaneous ICH, intracranial neoplasm, or AV malformation   []   Postpartum (within previous 14 days)   []   Recent GI or urinary tract hemorrhage (within previous 21 days)   []   Recent acute MI (within previous 3 months)   []   History of un-ruptured intracranial aneurysm LESS than 10 mm   []   History of ruptured intracranial aneurysm   []   Blood glucose LESS than 50 mg/dL (2.7 mmol/L)   []   Dural puncture  within the last 7 days   []   Known GREATER than 10 cerebral microbleeds   Additional exclusions for patients with symptoms onset between 3 and 4.5 hours.  [x]   Age > 80.   []   On any anticoagulants regardless of INR  >>> Warfarin (Coumadin), Heparin, Enoxaparin (Lovenox), fondaparinux (Arixtra), bivalirudin (Angiomax), Argatroban, dabigatran (Pradaxa), rivaroxaban (Xarelto), or apixaban (Eliquis)   []   Severe stroke (NIHSS > 25).   []   History of BOTH diabetes and previous ischemic stroke.   []   The risks and benefits have been discussed with the patient or family related to the administration of IV Alteplase for stroke symptoms.   []   I have discussed and reviewed the patient's case and imaging with the attending prior to IV Alteplase.    Time Alteplase administered       Hospital Meds:  Scheduled- atorvastatin, 10 mg, Oral, Nightly  budesonide-formoterol, 2 puff, Inhalation, BID - RT  carbidopa-levodopa, 2 tablet, Oral, TID  Divalproex Sodium, 125 mg, Oral, Q6H  escitalopram, 20 mg, Oral, Daily  folic acid, 1 mg, Oral, Daily  levothyroxine, 75 mcg, Oral, Q AM  nystatin, , Topical, Q12H  pantoprazole, 40 mg, Intravenous, Q12H  penicillin v potassium, 500 mg, Oral, Q12H  polyethylene glycol, 17 g, Oral, Daily  pramipexole, 0.25 mg, Oral, Daily  QUEtiapine, 12.5 mg, Oral, Nightly  sodium chloride, 10 mL, Intravenous, Q12H  topiramate, 100 mg, Oral, Daily      Infusions- lactated ringers, 9 mL/hr, Last Rate: 9 mL/hr (02/24/21 1024)  lactated ringers, 20 mL/hr, Last Rate: 20 mL/hr (03/01/21 1432)       PRNs- •  acetaminophen  •  albuterol  •  hydrALAZINE  •  potassium chloride **OR** potassium chloride **OR** potassium chloride  •  sodium chloride  •  sodium chloride    Functional Status Prior to Current Stroke/Preemption Score: MRS 4    NIH Stroke Scale  Time: 04:00 EST  Person Administering Scale: PETEY Persaud    1a  Level of consciousness: 1=not alert but arousable by minor stimulation to obey, answer or  respond   1b. LOC questions:  2= answers neither question correctly   1c. LOC commands: 0=Performs both tasks correctly   2.  Best Gaze: 2=forced deviation, or total gaze paresis not overcome by oculocephalic maneuver   3.  Visual: 0=No visual loss   4. Facial Palsy: 2=Partial paralysis (total or near total paralysis of the lower face)   5a.  Motor left arm: 1=Drift, limb holds 90 (or 45) degrees but drifts down before full 10 seconds: does not hit bed   5b.  Motor right arm: 1=Drift, limb holds 90 (or 45) degrees but drifts down before full 10 seconds: does not hit bed   6a. motor left leg: 3=No effort against gravity, limb falls   6b  Motor right leg:  3=No effort against gravity, limb falls   7. Limb Ataxia: 0=Absent   8.  Sensory: 1=Mild to moderate sensory loss; patient feels pinprick is less sharp or is dull on the affected side; there is a loss of superficial pain with pinprick but patient is aware He is being touched   9. Best Language:  2=Severe aphasia   10. Dysarthria: 1=Mild to moderate, patient slurs at least some words and at worst, can be understood with some difficulty   11. Extinction and Inattention: 1=Visual, tactile, auditory, spatial or personal inattention or extinction to bilateral simultaneous stimulation in one of the sensory modalities    Total:   20       Results Reviewed:  I have personally reviewed current lab, radiology, and data and agree with results.  WBC   Date Value Ref Range Status   03/03/2021 4.27 3.40 - 10.80 10*3/mm3 Final     RBC   Date Value Ref Range Status   03/03/2021 2.71 (L) 4.14 - 5.80 10*6/mm3 Final     Hemoglobin   Date Value Ref Range Status   03/03/2021 7.7 (L) 13.0 - 17.7 g/dL Final     Hematocrit   Date Value Ref Range Status   03/03/2021 25.7 (L) 37.5 - 51.0 % Final     MCV   Date Value Ref Range Status   03/03/2021 94.8 79.0 - 97.0 fL Final     MCH   Date Value Ref Range Status   03/03/2021 28.4 26.6 - 33.0 pg Final     MCHC   Date Value Ref Range Status    03/03/2021 30.0 (L) 31.5 - 35.7 g/dL Final     RDW   Date Value Ref Range Status   03/03/2021 18.3 (H) 12.3 - 15.4 % Final     RDW-SD   Date Value Ref Range Status   03/03/2021 58.2 (H) 37.0 - 54.0 fl Final     MPV   Date Value Ref Range Status   03/03/2021 10.0 6.0 - 12.0 fL Final     Platelets   Date Value Ref Range Status   03/03/2021 150 140 - 450 10*3/mm3 Final     Neutrophil %   Date Value Ref Range Status   03/03/2021 69.5 42.7 - 76.0 % Final     Lymphocyte %   Date Value Ref Range Status   03/03/2021 20.8 19.6 - 45.3 % Final     Monocyte %   Date Value Ref Range Status   03/03/2021 7.0 5.0 - 12.0 % Final     Eosinophil %   Date Value Ref Range Status   03/03/2021 1.6 0.3 - 6.2 % Final     Basophil %   Date Value Ref Range Status   03/03/2021 0.2 0.0 - 1.5 % Final     Immature Grans %   Date Value Ref Range Status   03/03/2021 0.9 (H) 0.0 - 0.5 % Final     Neutrophils, Absolute   Date Value Ref Range Status   03/03/2021 2.96 1.70 - 7.00 10*3/mm3 Final     Lymphocytes, Absolute   Date Value Ref Range Status   03/03/2021 0.89 0.70 - 3.10 10*3/mm3 Final     Monocytes, Absolute   Date Value Ref Range Status   03/03/2021 0.30 0.10 - 0.90 10*3/mm3 Final     Eosinophils, Absolute   Date Value Ref Range Status   03/03/2021 0.07 0.00 - 0.40 10*3/mm3 Final     Basophils, Absolute   Date Value Ref Range Status   03/03/2021 0.01 0.00 - 0.20 10*3/mm3 Final     Immature Grans, Absolute   Date Value Ref Range Status   03/03/2021 0.04 0.00 - 0.05 10*3/mm3 Final     nRBC   Date Value Ref Range Status   03/03/2021 0.0 0.0 - 0.2 /100 WBC Final     Lab Results   Component Value Date    GLUCOSE 81 03/03/2021    BUN 11 03/03/2021    CREATININE 0.47 (L) 03/03/2021    EGFRIFNONA >150 03/03/2021    BCR 23.4 03/03/2021    K 2.9 (L) 03/03/2021    CO2 29.0 03/03/2021    CALCIUM 7.6 (L) 03/03/2021    ALBUMIN 2.30 (L) 03/03/2021    AST 8 03/03/2021    ALT <5 03/03/2021     Ct Angiogram Neck    Result Date: 3/3/2021  1.  Abrupt and  complete occlusion of the superior division of the right MCA just beyond the M1 bifurcation. 2.  No additional intracranial large vessel occlusion or high-grade stenosis. 3.  Prior thoracic aortic surgery with potential great vessel grafts at the base of the neck (no details available). All vessels in the neck appear widely patent with no narrowing or dissection. NOTIFICATION: Critical Value/emergent results were telephoned by me to stroke team nurse navigatorDevi, at 05:00 hours on 3/3/2021. Signer Name: Edgar Caballero MD  Signed: 3/3/2021 5:10 AM  Workstation Name: [facility]    Mri Brain Without Contrast    Result Date: 3/3/2021  1.  Large region of acute ischemia corresponding to the anterior portion of the right MCA territory as detailed above. 2.  No additional intracranial lesions. Signer Name: Edgar Caballero MD  Signed: 3/3/2021 5:33 AM  Workstation Name: [facility]    Xr Chest 1 View    Result Date: 3/3/2021  Suspected mild vascular congestion. Left lung base consolidation and/or pleural effusion. Signer Name: Edgar Caballero MD  Signed: 3/3/2021 4:44 AM  Workstation Name: [facility]    Ct Angiogram Head    Result Date: 3/3/2021  1.  Abrupt and complete occlusion of the superior division of the right MCA just beyond the M1 bifurcation. 2.  No additional intracranial large vessel occlusion or high-grade stenosis. 3.  Prior thoracic aortic surgery with potential great vessel grafts at the base of the neck (no details available). All vessels in the neck appear widely patent with no narrowing or dissection. NOTIFICATION: Critical Value/emergent results were telephoned by me to stroke team nurse navigDevi phillips, at 05:00 hours on 3/3/2021. Signer Name: Edgar Caballero MD  Signed: 3/3/2021 5:10 AM  Workstation Name: [facility]    Ct Head  Without Contrast Stroke Protocol    Result Date: 3/3/2021  1. No acute intracranial abnormality. 2. Mild diffuse chronic changes as noted above. NOTIFICATION: Critical Value/emergent results were relayed from me to the ED treatment team by telephone through the CT technologist, Hetal, at 04:34 on 3/3/2021. Signer Name: Edgar Caballero MD  Signed: 3/3/2021 4:36 AM  Workstation Name: [facility]  Radiology [facility]    Ct Cerebral Perfusion With & Without Contrast    Result Date: 3/3/2021  1.  Acute ischemia in the anterior portion of the right MCA distribution centered on the frontal and temporal operculum likely involving superior MCA division. 2.  Smaller region of core infarction within the region of ischemia as detailed above. Signer Name: Edgar Caballero MD  Signed: 3/3/2021 4:54 AM  Workstation Name: [facility]  Radiology [facility]           Assessment/Plan: 85-year-old,  male with a known diagnosis of HTN, HLD, AAA s/p repair (1993), Parkinson's, dementia, CHF, hypothyroidism, depression and bipolar disorder who was admitted to [facility] on 2/21/2021 with GI bleed secondary to stercoral ulcers and dieulafoy ulcer (s/p clipping on 3/1/2021) now with acute neurologic decline for which a code stroke was called.  Left-sided deficits, eye deviation, aphasia, and dysarthria, LKW 0200, NIHS 20, /90.    Diagnostic/imaging review  CT head negative for acute abnormality  CT perfusion shows acute ischemia in the anterior portion of the right MCA territory  CTA head and neck shows abrupt occlusion of the superior division of the right MCA just beyond the M1, no additional intracranial LVO or high-grade stenosis.  MRI shows large region of acute ischemia in the right MCA territory involving posterior frontal lobe, anterior temporal lobe, and anterior insular cortex corresponding with CTP/CTA      1. Acute right MCA territory infarct - possibly cardioembolic.  Not a  candidate for TPA 2/2 GI bleeding.  Not a candidate for neuro intervention 2/2 unfavorable aspects score and poor baseline level of functioning-Case and imaging was reviewed with Dr. Ramos.   -TIA/ischemic stroke order set without thrombolytics has been initiated   -No aspirin 2/2 GI bleeding, ongoing anemia and thrombocytopenia   -Escalate statin to atorvastatin 80 mg   -A1c and lipid panel added to a.m. labs   -Patient should be n.p.o. until reevaluated by speech, was previously on modified diet   -Stroke neurology will follow    *Patient's wife at bedside.  I have updated her as to the patient's condition, imaging, and plan of care.  She is aware that this is unfortunately a large stroke likely resulting in significant disability.  In conjunction with patient's other medical problems she would like to make patient's DNR/DNI and is interested in speaking with the palliative team.  I have updated the patient's CODE STATUS and placed a palliative consult.      Kristan Acosta, PETEY  March 3, 2021  04:11 EST

## 2021-03-03 NOTE — PLAN OF CARE
Goal Outcome Evaluation:  Plan of Care Reviewed With: patient, spouse  Progress: declining   SLP evaluations completed. Will address suspected cont'd severe oropharyngeal dysphagia and cog/comm dx/tx pending GOC decisions. Please see note for further details and recommendations.

## 2021-03-03 NOTE — PLAN OF CARE
Goal Outcome Evaluation:  Plan of Care Reviewed With: patient  Progress: no change  Outcome Summary: VSS, AO to self only, SR on monitor.  Patient very pleasant and cooperative with care.  Patient taking meds crushed in applesauce.  Plan [facility] at 1100.  Patient currently resting comfortably in bed.  Will continue to monitor.     0345 - Code Stroke initiated.  Patient found to have left facial droop, left sided weakness, dysarthria, and garbled speech.  VS, FSBG, EKG obtained. Stat imaging obtained.  See Results pane for more information.  NIH of 20.  See stroke navigator's note for more information.  Will continue to monitor.

## 2021-03-03 NOTE — SIGNIFICANT NOTE
Code stroke called for acute onset of altered mentation. Upon examination, the patient is lying in bed in no acute distress. HR 60s, /50, saturating 98% on room air. Patient noted to have right-sided facial droop, aphasia, and left-sided weakness. The patient attempts to follow directions. Will get labs, ECG, and CXR. Stroke navigator at the bedside, recommends CT head, CTA head/neck, CT perfusion and MRI brain. Will place formal neurology consult for the a.m. Spoke with the patient's wife, Sandra and updated her on the patient's current condition.

## 2021-03-03 NOTE — PLAN OF CARE
Goal Outcome Evaluation:  Plan of Care Reviewed With: spouse  Progress: declining  Outcome Summary: Met with wife Sandra who was very tearful. She states she has talked with her 3 dtrs who are all in [place] and they want to try a keofeed although pt is already restless and tachypneic. Discussed that pt had a progressive disease and now a stroke both affecting his swallowing. She verbalized that he is going to die. She was not expecting pt to have a stroke. She knows that he will likely pull out keofeed and she would not want reinserted. Dr Rivero to come and talk with wife. She states I am not changing my mind and this is what the girls want.  Support to wife who has her own health issues and plans to start radiation for her breast cancer. Encouraged her to let dtrs know that if they want to be present or see their Dad that they should be making arrangements to come or call. Discussed hospice and palliative support  Problem: Palliative Care  Goal: Enhanced Quality of Life  Intervention: Optimize Psychosocial Wellbeing  Flowsheets (Taken 3/3/2021 1517)  Supportive Measures:   active listening utilized   verbalization of feelings encouraged   problem-solving facilitated   decision-making supported   goal setting facilitated  Family/Support System Care:   caregiver stress acknowledged   involvement promoted   presence promoted   self-care encouraged   support provided

## 2021-03-03 NOTE — THERAPY EVALUATION
Acute Care - Speech Language Pathology Initial Evaluation  [facility]   Cognitive-Communication Evaluation  Clinical Swallow Evaluation     Patient Name: Amador White  : 1935  MRN: [id number]  Today's Date: 3/3/2021               Admit Date: 2021     Visit Dx:    ICD-10-CM ICD-9-CM   1. Sepsis, due to unspecified organism, unspecified whether acute organ dysfunction present (CMS/[facility])  A41.9 038.9     995.91   2. Acute lower GI bleeding  K92.2 578.9   3. Anemia, unspecified type  D64.9 285.9   4. Oropharyngeal dysphagia  R13.12 787.22   5. Ulcer of rectum  K62.6 569.41   6. Cognitive communication deficit  R41.841 799.52     Patient Active Problem List   Diagnosis   • Chronic fibrosis of lung (CMS/[facility])   • Chronic asthma   • Allergic rhinitis   • Hypertension   • Migraine   • Left Diaphragmatic paralysis/postop   • Bronchiectasis without complication (CMS/[facility])   • Colitis   • CHF (congestive heart failure) (CMS/[facility])   • Bipolar 1 disorder (CMS/[facility])   • Dementia (CMS/[facility])   • Parkinson's disease (CMS/[facility])   • H/O aortic aneurysm repair   • Depression   • Acute lower GI bleeding     Past Medical History:   Diagnosis Date   • Arthritis    • Asthma    • Bipolar 1 disorder (CMS/[facility])    • CHF (congestive heart failure) (CMS/[facility])    • COPD (chronic obstructive pulmonary disease) (CMS/[facility])    • Dementia (CMS/[facility])    • Depression    • Disease of thyroid gland    • H/O chest x-ray 2012    Left diaphragm to be elevated chronically. There is evidence of previous surgery on left with line of clips along the left hilum. There is large thoracic aortic aneurysm overlying the chest. There is a graft into the left hilum and a clip in left paratracheal region. There are mirco calcifications in right base. Apices appear hyperlucent, right more so that left. I cannot rule out bullous disease   • Hyperlipidemia    • Hypertension    • Insomnia    • Parkinson's disease (CMS/[facility])    • RLS (restless legs syndrome)    •  Tremor of hands and face     Follwed by Dr. Dumont     Past Surgical History:   Procedure Laterality Date   • ABDOMINAL AORTIC ANEURYSM REPAIR      1993   • CATARACT EXTRACTION, BILATERAL  2017   • CHOLECYSTECTOMY     • COLONOSCOPY N/A 2/24/2021    Procedure: COLONOSCOPY;  Surgeon: Brunner, Mark I, MD;  Location:  [facility];  Service: Gastroenterology;  Laterality: N/A;   • EYE SURGERY     • LARYNX SURGERY     • SIGMOIDOSCOPY N/A 3/1/2021    Procedure: SIGMOIDOSCOPY FLEXIBLE;  Surgeon: Brunner, Mark I, MD;  Location:  [facility];  Service: Gastroenterology;  Laterality: N/A;   • TONSILLECTOMY AND ADENOIDECTOMY          SLP EVALUATION (last 72 hours)      SLP SLC Evaluation     Row Name 03/03/21 5746                   Communication Assessment/Intervention    Document Type  re-evaluation  -RD        Subjective Information  no complaints  -RD        Patient Observations  alert  -RD        Patient/Family/Caregiver Comments/Observations  Spouse present  -RD        Patient Effort  fair  -RD           General Information    Patient Profile Reviewed  yes  -RD        Pertinent History Of Current Problem  Re-consult on CVA pathway 2' new R MCA CVA per MRI. Failed RN dysphagia screen and made NPO. Pt w/ aspiration of all consistencies w/ severe oropharyngeal dysphagia prior to CVA and opted for comfort diet after MBS results 2/26/21. Hx of Parkinson's dz, Dementia, CHF, HTN, Prior GI concerns- collitis. Current CXR revealed LLB consolidation.   -RD        Precautions/Limitations, Vision  vision impairment, bilaterally;corrective lenses needed for reading  -RD        Precautions/Limitations, Hearing  WFL;for purposes of eval  -RD        Prior Level of Function-Communication  cognitive-linguistic impairment;other (see comments) PD & dementia prior to CVA  -RD        Plans/Goals Discussed with  patient;spouse/S.O.  -RD        Barriers to Rehab  cognitive status;previous functional deficit;medically complex  -RD         Patient's Goals for Discharge  patient could not state  -RD        Family Goals for Discharge  patient able to return to previous activities/roles  -RD           Pain    Additional Documentation  Pain Scale: FACES Pre/Post-Treatment (Group)  -RD           Pain Scale: FACES Pre/Post-Treatment    Pain: FACES Scale, Pretreatment  0-->no hurt  -RD        Posttreatment Pain Rating  0-->no hurt  -RD           Comprehension Assessment/Intervention    Comprehension Assessment/Intervention  Auditory Comprehension  -RD           Auditory Comprehension Assessment/Intervention    Auditory Comprehension (Communication)  moderate impairment;unable/difficult to assess  -RD        Able to Identify Objects/Pictures (Communication)  unable/difficult to assess;other (see comments) suspected visual deficit  -RD        Answers Questions (Communication)  moderate impairment;severe impairment;simple;wh questions;mild impairment;personal;unable/difficult to assess  -RD        Able to Follow Commands (Communication)  moderate impairment;severe impairment;1-step  -RD        Narrative Discourse  unable/difficult to assess  -RD        Auditory Comprehension Communication, Comment  Suspect cognition impacting  -RD           Expression Assessment/Intervention    Expression Assessment/Intervention  verbal expression  -RD           Verbal Expression Assessment/Intervention    Verbal Expression  unable/difficult to assess  -RD        Automatic Speech (Communication)  WFL;response to greeting  -RD        Spontaneous/Functional Words  WFL;simple  -RD        Sentence Formulation  unable/difficult to assess  -RD        Conversational Discourse/Fluency  unable/difficult to assess  -RD        Verbal Expression, Comment  Difficult to fully assess 2' cognition & severity of dysarthria  -RD           Motor Speech Assessment/Intervention    Motor Speech Function  moderate impairment;severe impairment  -RD        Characteristics Consistent with Dysarthria   decreased articulation;decreased intensity;hypernasality;slurred speech  -RD        Motor Speech, Comment  Speech more intellgible w/ single word responses. Unintelligible in conversation.   -RD           Cognitive Assessment Intervention- SLP    Cognitive Function (Cognition)  moderate impairment;severe impairment;unable/difficult to assess  -RD        Orientation Status (Cognition)  moderate impairment;severe impairment;place;time;situation;WFL;person  -RD        Memory (Cognitive)  unable/difficult to assess;other (see comments) 2' severity of dysarthria; dementia at baseline  -RD        Attention (Cognitive)  moderate impairment;severe impairment;sustained  -RD        Thought Organization (Cognitive)  unable/difficult to assess  -RD        Reasoning (Cognitive)  unable/difficult to assess  -RD        Problem Solving (Cognitive)  unable/difficult to assess  -RD        Functional Math (Cognitive)  unable/difficult to assess  -RD        Executive Function (Cognition)  unable/difficult to assess  -RD        Pragmatics (Communication)  moderate impairment;severe impairment;initiation;eye contact;topic maintenance  -RD        Right Hemisphere Function  deficit awareness;safety awareness;visuo-spatial;visuo-perceptual;pragmatics  -RD        Cognition, Comment  Difficult to fully assess 2' severity of dysarthria and baseline PD & dementia.  -RD           SLP Clinical Impressions    SLP Diagnosis  Moderate-severe cognitive-communication deficit, although difficult to fully assess all areas 2' severity of dysarthria. Moderate-severe flaccid dysarthria noted. Pt appears unaware of deficit. Difficulty following commands and only oriented to person. Continue dx/tx pending GOC.   -RD        Rehab Potential/Prognosis  guarded  -RD        SLC Criteria for Skilled Therapy Interventions Met  yes  -RD        Functional Impact  difficulty communicating wants, needs;difficulty communicating in an emergency;difficulty in expressing  complex messages;decreased ability to respond to situations safely;Poor Judgement;difficulty completing home management task;functional impact in social situations;functional impact in ADLs;unable to make medical decisions  -RD           Recommendations    Therapy Frequency (SLP SLC)  5 days per week  -RD        Predicted Duration Therapy Intervention (Days)  until discharge  -RD        Anticipated Discharge Disposition (SLP)  unknown;anticipate therapy at next level of care;other (see comments) pending GOC decisions  -RD          User Key  (r) = Recorded By, (t) = Taken By, (c) = Cosigned By    Initials Name Effective Dates    RD Pita Gonsalez, MS CCC-SLP 04/03/18 -              EDUCATION  The patient has been educated in the following areas:     Cognitive Impairment Communication Impairment.    SLP Recommendation and Plan  SLP Diagnosis: Moderate-severe cognitive-communication deficit, although difficult to fully assess all areas 2' severity of dysarthria. Moderate-severe flaccid dysarthria noted. Pt appears unaware of deficit. Difficulty following commands and only oriented to person. Continue dx/tx pending GOC.         Swallow Criteria for Skilled Therapeutic Interventions Met: demonstrates skilled criteria  SLC Criteria for Skilled Therapy Interventions Met: yes  Anticipated Discharge Disposition (SLP): unknown, anticipate therapy at next level of care, other (see comments)(pending GOC decisions)     Therapy Frequency (Swallow): PRN  Predicted Duration Therapy Intervention (Days): until discharge                   Plan of Care Reviewed With: patient, spouse  Progress: declining      SLP GOALS     Row Name 03/03/21 0930             Reduce Perception of Hypernasality Goal 1 (SLP)    Reduce Perception of Hypernasality By Goal 1 (SLP)  opening mouth wider for speech;60%;with moderate cues (50-74%)  -RD      Time Frame (Perception of Hypernasality Goal 1, SLP)  short term goal (STG)  -RD         Articulation  Goal 1 (SLP)    Improve Articulation Goal 1 (SLP)  by over-articulating at word level;60%;with moderate cues (50-74%)  -RD      Time Frame (Articulation Goal 1, SLP)  short term goal (STG)  -RD         Attention Goal 1 (SLP)    Improve Attention by Goal 1 (SLP)  looking at speaker;attending to task;70%;with moderate cues (50-74%)  -RD      Time Frame (Attention Goal 1, SLP)  short term goal (STG)  -RD         Orientation Goal 1 (SLP)    Improve Orientation Through Goal 1 (SLP)  demonstrating orientation to disease/impairment;demonstrating orientation to month;demonstrating orientation to year;demonstrating orientation to place;80%;with moderate cues (50-74%)  -RD      Time Frame (Orientation Goal 1, SLP)  short term goal (STG)  -RD         Pragmatic Skills Goal 1 (SLP)    Improve Pragmatic Skills Goal 1 (SLP)  demonstrate topic initiation;80%;with moderate cues (50-74%)  -RD      Time Frame (Pragmatic Skills Goal 1, SLP)  short term goal (STG)  -RD         Additional Goal 1 (SLP)    Additional Goal 1, SLP  LTG: Pt will improve cognitive-communication skills to convey wants/needs w/ 70% accuracy w/o cues  -RD      Time Frame (Additional Goal 1, SLP)  short term goal (STG)  -RD        User Key  (r) = Recorded By, (t) = Taken By, (c) = Cosigned By    Initials Name Provider Type    Pita White MS CCC-SLP Speech and Language Pathologist                  Time Calculation:     Time Calculation- SLP     Row Name 03/03/21 1228             Time Calculation- SLP    SLP Start Time  0930  -RD      SLP Received On  03/03/21  -RD        User Key  (r) = Recorded By, (t) = Taken By, (c) = Cosigned By    Initials Name Provider Type    Pita White MS CCC-SLP Speech and Language Pathologist          Therapy Charges for Today     Code Description Service Date Service Provider Modifiers Qty    04613008761  ST EVAL ORAL PHARYNG SWALLOW 4 3/3/2021 Pita Gonsalez MS CCC-SLP GN 1    [id number]  ST EVAL SPEECH  AND PROD W LANG  3 3/3/2021 Pita Gonsalez, MS CCC-SLP GN 1                     Pita Gonsalez, MS CCC-SLP  3/3/2021 and Acute Care - Speech Language Pathology   Swallow Initial Evaluation [facility]     Patient Name: Amador White  : 1935  MRN: [id number]  Today's Date: 3/3/2021               Admit Date: 2021    Visit Dx:     ICD-10-CM ICD-9-CM   1. Sepsis, due to unspecified organism, unspecified whether acute organ dysfunction present (CMS/[facility])  A41.9 038.9     995.91   2. Acute lower GI bleeding  K92.2 578.9   3. Anemia, unspecified type  D64.9 285.9   4. Oropharyngeal dysphagia  R13.12 787.22   5. Ulcer of rectum  K62.6 569.41   6. Cognitive communication deficit  R41.841 799.52     Patient Active Problem List   Diagnosis   • Chronic fibrosis of lung (CMS/[facility])   • Chronic asthma   • Allergic rhinitis   • Hypertension   • Migraine   • Left Diaphragmatic paralysis/postop   • Bronchiectasis without complication (CMS/[facility])   • Colitis   • CHF (congestive heart failure) (CMS/[facility])   • Bipolar 1 disorder (CMS/[facility])   • Dementia (CMS/[facility])   • Parkinson's disease (CMS/[facility])   • H/O aortic aneurysm repair   • Depression   • Acute lower GI bleeding     Past Medical History:   Diagnosis Date   • Arthritis    • Asthma    • Bipolar 1 disorder (CMS/[facility])    • CHF (congestive heart failure) (CMS/[facility])    • COPD (chronic obstructive pulmonary disease) (CMS/[facility])    • Dementia (CMS/[facility])    • Depression    • Disease of thyroid gland    • H/O chest x-ray 2012    Left diaphragm to be elevated chronically. There is evidence of previous surgery on left with line of clips along the left hilum. There is large thoracic aortic aneurysm overlying the chest. There is a graft into the left hilum and a clip in left paratracheal region. There are mirco calcifications in right base. Apices appear hyperlucent, right more so that left. I cannot rule out bullous disease   • Hyperlipidemia    • Hypertension    • Insomnia     • Parkinson's disease (CMS/[facility])    • RLS (restless legs syndrome)    • Tremor of hands and face     Follwed by Dr. Dumont     Past Surgical History:   Procedure Laterality Date   • ABDOMINAL AORTIC ANEURYSM REPAIR      1993   • CATARACT EXTRACTION, BILATERAL  2017   • CHOLECYSTECTOMY     • COLONOSCOPY N/A 2/24/2021    Procedure: COLONOSCOPY;  Surgeon: Brunner, Mark I, MD;  Location:  [facility];  Service: Gastroenterology;  Laterality: N/A;   • EYE SURGERY     • LARYNX SURGERY     • SIGMOIDOSCOPY N/A 3/1/2021    Procedure: SIGMOIDOSCOPY FLEXIBLE;  Surgeon: Brunner, Mark I, MD;  Location:  [facility];  Service: Gastroenterology;  Laterality: N/A;   • TONSILLECTOMY AND ADENOIDECTOMY          SWALLOW EVALUATION (last 72 hours)      SLP Adult Swallow Evaluation     Row Name 03/03/21 0930                   General Information    Current Method of Nutrition  NPO  -RD        Prior Level of Function-Communication  cognitive-linguistic impairment  -RD        Prior Level of Function-Swallowing  other (see comments) Severe oropharyngeal dysphagia w/ comfort diet prior to CVA  -RD        Patient's Goals for Discharge  patient could not state  -RD        Family Goals for Discharge  patient able to eat/drink without coughing/choking;patient able to return to PO diet;other (see comments) unsure of GOC   -RD           Oral Motor Structure and Function    Dentition Assessment  natural, present and adequate  -RD        Secretion Management  problems swallowing secretions  -RD        Mucosal Quality  moist, healthy  -RD        Volitional Swallow  unable to elicit  -RD        Volitional Cough  unable to elicit  -RD           Oral Musculature and Cranial Nerve Assessment    Oral Motor General Assessment  oral labial or buccal impairment;lingual impairment;generalized oral motor weakness;velar impairment  -RD        Oral Labial or Buccal Impairment, Detail, Cranial Nerve VII (Facial):  left labial droop;reduced ROM  -RD         Lingual Impairment, Detail. Cranial Nerves IX, XII (Glossopharyngeal and Hypoglossal)  reduced strength left;reduced strength;bilaterally;reduced lingual ROM  -RD        Velar Impairment, Detail, Cranial Nerves X, XI (Vagus and Accessory)  CN10/11: Motor Impairment;reduced velar strength  -RD           General Eating/Swallowing Observations    Respiratory Support Currently in Use  room air  -RD        Eating/Swallowing Skills  fed by SLP;unsafe self-feeding skills observed;unaware of safety concerns  -RD        Positioning During Eating  upright 90 degree;upright in bed  -RD        Utensils Used  spoon;cup  -RD        Consistencies Trialed  thin liquids;nectar/syrup-thick liquids;pureed  -RD           Clinical Swallow Eval    Oral Prep Phase  impaired  -RD        Oral Transit  impaired  -RD        Oral Residue  impaired  -RD        Pharyngeal Phase  suspected pharyngeal impairment  -RD           Oral Prep Concerns    Oral Prep Concerns  incomplete bolus preparation;incomplete or weak lip closure around spoon;anterior loss;reduced lip opening;bolus removed from mouth manually;oral holding  -RD        Oral Holding  all consistencies  -RD        Reduced Lip Opening  all consistencies  -RD        Incomplete or Weak Lip Closure Around Spoon  all consistencies  -RD        Anterior Loss  thin  -RD        Incomplete Bolus Preparation  all consistencies  -RD        Bolus Removed from Mouth Manually  all consistencies  -RD           Oral Transit Concerns    Oral Transit Concerns  unable to initiate oral transit  -RD        Oral Transit Concerns, Comment  w/ all consistencies  -RD           Oral Residue Concerns    Oral Residue Concerns  diffuse residue throughout oral cavity  -RD        Diffuse Residue Throughout Oral Cavity  thin;nectar;pudding;all consistencies  -RD        Oral Residue Concerns, Comment  Removed via swab  -RD           Pharyngeal Phase Concerns    Pharyngeal Phase Concerns  wet vocal quality;other (see  "comments) no visible swallow initiation   -RD        Wet Vocal Quality  thin  -RD        Pharyngeal Phase Concerns, Comment  Repeat clinical dysphagia evaluation after R MCA CVA. Pt was previously aspirating all consistencies 2' severe dysphagia and opted for a comfort diet. Now NPO given change in status. Poor oral phase. Unable to initiate bolus transit. Wet vocal quality noted after thin trials. No visible swallow initiation w/ any trial despite max cues and pt attempts to initiate. Spouse present and attempted to force pt to drink \"coke\" stating that he \"wants to drink it\". Pt unable to initiate swallow even w/ spouse attempts. Extensive education regarding hx of silent aspiration of all PO, comfort diet, feeding tube options, and GOC. Pt will likely not be able to maintain any form of nutrition w/ PO given severity of dysphagia and cognition d/t new CVA. Discussed PEG, but also explained that long term alternate nutrition would likely not prolong or improve overall quality of life given prior dementia & PD. Spouse unsure of GOC decisions, but feels like pt would not want feeding tube. Will f/u pending GOC decisions. Notified MD of discussion and pt severity.   -RD           Clinical Impression    SLP Swallowing Diagnosis  severe;oral dysphagia;suspected pharyngeal dysphagia;other (see comments) suspect cont'd severe pharyngeal dysphagia  -RD        Functional Impact  risk of aspiration/pneumonia;risk of malnutrition;risk of dehydration  -RD        Rehab Potential/Prognosis, Swallowing  re-evaluate goals as necessary  -RD        Swallow Criteria for Skilled Therapeutic Interventions Met  demonstrates skilled criteria  -RD           Recommendations    Therapy Frequency (Swallow)  PRN  -RD        SLP Diet Recommendation  NPO;other (see comments) vs. comfort diet/measures pending GOC  -RD        Recommended Diagnostics  reassess via clinical swallow evaluation  -RD        Oral Care Recommendations  Oral Care " BID/PRN  -RD        SLP Rec. for Method of Medication Administration  meds via alternate route  -RD        Monitor for Signs of Aspiration  yes;notify SLP if any concerns  -RD          User Key  (r) = Recorded By, (t) = Taken By, (c) = Cosigned By    Initials Name Effective Dates    MARBIN Markymichaelpepe Pita AMARI, MS CCC-SLP 04/03/18 -           EDUCATION  The patient has been educated in the following areas:   Dysphagia (Swallowing Impairment) Oral Care/Hydration NPO rationale comfort diet education.    SLP Recommendation and Plan  SLP Swallowing Diagnosis: severe, oral dysphagia, suspected pharyngeal dysphagia, other (see comments)(suspect cont'd severe pharyngeal dysphagia)  SLP Diet Recommendation: NPO, other (see comments)(vs. comfort diet/measures pending GO)     SLP Rec. for Method of Medication Administration: meds via alternate route     Monitor for Signs of Aspiration: yes, notify SLP if any concerns  Recommended Diagnostics: reassess via clinical swallow evaluation  Swallow Criteria for Skilled Therapeutic Interventions Met: demonstrates skilled criteria  Anticipated Discharge Disposition (SLP): unknown, anticipate therapy at next level of care, other (see comments)(pending GOC decisions)  Rehab Potential/Prognosis, Swallowing: re-evaluate goals as necessary  Therapy Frequency (Swallow): PRN  Predicted Duration Therapy Intervention (Days): until discharge                         Plan of Care Reviewed With: patient, spouse  Progress: declining    SLP GOALS     Row Name 03/03/21 0930             Reduce Perception of Hypernasality Goal 1 (SLP)    Reduce Perception of Hypernasality By Goal 1 (SLP)  opening mouth wider for speech;60%;with moderate cues (50-74%)  -RD      Time Frame (Perception of Hypernasality Goal 1, SLP)  short term goal (STG)  -RD         Articulation Goal 1 (SLP)    Improve Articulation Goal 1 (SLP)  by over-articulating at word level;60%;with moderate cues (50-74%)  -RD      Time Frame  (Articulation Goal 1, SLP)  short term goal (STG)  -RD         Attention Goal 1 (SLP)    Improve Attention by Goal 1 (SLP)  looking at speaker;attending to task;70%;with moderate cues (50-74%)  -RD      Time Frame (Attention Goal 1, SLP)  short term goal (STG)  -RD         Orientation Goal 1 (SLP)    Improve Orientation Through Goal 1 (SLP)  demonstrating orientation to disease/impairment;demonstrating orientation to month;demonstrating orientation to year;demonstrating orientation to place;80%;with moderate cues (50-74%)  -RD      Time Frame (Orientation Goal 1, SLP)  short term goal (STG)  -RD         Pragmatic Skills Goal 1 (SLP)    Improve Pragmatic Skills Goal 1 (SLP)  demonstrate topic initiation;80%;with moderate cues (50-74%)  -RD      Time Frame (Pragmatic Skills Goal 1, SLP)  short term goal (STG)  -RD         Additional Goal 1 (SLP)    Additional Goal 1, SLP  LTG: Pt will improve cognitive-communication skills to convey wants/needs w/ 70% accuracy w/o cues  -RD      Time Frame (Additional Goal 1, SLP)  short term goal (STG)  -RD        User Key  (r) = Recorded By, (t) = Taken By, (c) = Cosigned By    Initials Name Provider Type    Pita White MS CCC-SLP Speech and Language Pathologist             Time Calculation:   Time Calculation- SLP     Row Name 03/03/21 1228             Time Calculation- SLP    SLP Start Time  0930  -RD      SLP Received On  03/03/21  -        User Key  (r) = Recorded By, (t) = Taken By, (c) = Cosigned By    Initials Name Provider Type    Pita White MS CCC-SLP Speech and Language Pathologist          Therapy Charges for Today     Code Description Service Date Service Provider Modifiers Qty    [id number] HC ST EVAL ORAL PHARYNG SWALLOW 4 3/3/2021 Pita Gonsalez MS CCC-SLP GN 1    [id number] HC ST EVAL SPEECH AND PROD W LANG  3 3/3/2021 Pita Gonsalez MS CCC-SLP GN 1            Patient was not wearing a face mask and did not exhibit coughing during  this therapy encounter.  Procedure performed was aerosolizing, involved close contact (within 6 feet for at least 15 minutes or longer), and did not involve contact with infectious secretions or specimens.  Therapist used appropriate personal protective equipment including gloves, standard procedure mask and eye protection.  Appropriate PPE was worn during the entire therapy session.  Hand hygiene was completed before and after therapy session.  Manuela Dumas, SLP student was also present during this encounter and the aforementioned applies to them, as well.     Pita Gonsalez MS CCC-SLP  3/3/2021

## 2021-03-03 NOTE — PROGRESS NOTES
[facility] Medicine Services  PROGRESS NOTE    Patient Name: Amador White  : 1935  MRN: [id number]    Date of Admission: 2021  Primary Care Physician: Mandy Shahid MD    Subjective   Subjective     CC: CVA    HPI: Overnight patient developed AMS and facial droop. Unfortunately was found to have massive right MCA stroke. Patient is essentially unresponsive at time of my arrival. Wife at bedside expressing wishes to make him comfortable.    ROS:  UTO due to AMS.    Objective   Objective     Vital Signs:   Temp:  [97.6 °F (36.4 °C)-98 °F (36.7 °C)] 98 °F (36.7 °C)  Heart Rate:  [56-74] 71  Resp:  [15-16] 16  BP: (118-134)/(53-75) 132/53  Total (NIH Stroke Scale): 20     Physical Exam:  Constitutional: Largely nonresponsive  HENT: NCAT, mucous membranes moist  Respiratory: Clear to auscultation bilaterally, respiratory effort normal   Cardiovascular: RRR, no murmurs, rubs, or gallops  Gastrointestinal: Positive bowel sounds, soft, nontender, nondistended  Musculoskeletal: No bilateral ankle edema  Psychiatric: RAMIRO  Neurologic: RAMIRO  Skin: No rashes    Results Reviewed:  Results from last 7 days   Lab Units 21  0910 217  219   WBC 10*3/mm3 4.27 6.90  --   --  9.40 5.88   HEMOGLOBIN g/dL 7.7* 8.7* 7.2*   < > 8.6* 8.4*   HEMATOCRIT % 25.7* 27.4* 23.9*   < > 28.3* 28.7*   PLATELETS 10*3/mm3 150 146  --   --  148 131*   INR   --   --   --   --   --  1.19*    < > = values in this interval not displayed.     Results from last 7 days   Lab Units 214 21  0757   SODIUM mmol/L 141 144 149*   POTASSIUM mmol/L 2.9* 3.8 4.3   CHLORIDE mmol/L 107 112* 116*   CO2 mmol/L 29.0 28.0 27.0   BUN mg/dL 11 10 14   CREATININE mg/dL 0.47* 0.44* 0.46*   GLUCOSE mg/dL 81 82 85   CALCIUM mg/dL 7.6* 8.0* 8.0*   ALT (SGPT) U/L <5  --   --    AST (SGOT) U/L 8  --   --    TROPONIN T ng/mL 0.015  --   --           Estimated Creatinine Clearance: 66.7 mL/min (A) (by C-G formula based on SCr of 0.47 mg/dL (L)).    Microbiology Results Abnormal     Procedure Component Value - Date/Time    COVID PRE-OP / PRE-PROCEDURE SCREENING ORDER (NO ISOLATION) - Swab, Nasopharynx [[id number]]  (Normal) Collected: 03/02/21 1214    Lab Status: Final result Specimen: Swab from Nasopharynx Updated: 03/02/21 1401    Narrative:      The following orders were created for panel order COVID PRE-OP / PRE-PROCEDURE SCREENING ORDER (NO ISOLATION) - Swab, Nasopharynx.  Procedure                               Abnormality         Status                     ---------                               -----------         ------                     COVID-19,CEPHEID,ALFONSO IN-...[[id number]]  Normal              Final result                 Please view results for these tests on the individual orders.    COVID-19,CEPHEID,ALFONSO IN-HOUSE(OR EMERGENT/ADD-ON),NP SWAB IN TRANSPORT MEDIA 3-4 HR TAT - Swab, Nasopharynx [[id number]]  (Normal) Collected: 03/02/21 1214    Lab Status: Final result Specimen: Swab from Nasopharynx Updated: 03/02/21 1401     COVID19 Not Detected    Narrative:      Fact sheet for providers: https://www.fda.gov/media/893086/download     Fact sheet for patients: https://www.fda.gov/media/736274/download    Blood Culture - Blood, Hand, Left [[id number]] Collected: 02/22/21 0321    Lab Status: Final result Specimen: Blood from Hand, Left Updated: 02/27/21 0400     Blood Culture No growth at 5 days      Aerobic bottle only    Blood Culture - Blood, Hand, Right [[id number]] Collected: 02/22/21 0321    Lab Status: Final result Specimen: Blood from Hand, Right Updated: 02/27/21 0400     Blood Culture No growth at 5 days      Aerobic bottle only    Urine Culture - Urine, Urine, Catheter [[id number]]  (Normal) Collected: 02/21/21 1741    Lab Status: Final result Specimen: Urine, Catheter Updated: 02/23/21 0823     Urine Culture No growth    Clostridium Difficile  Toxin - Stool, Per Rectum [[id number]]  (Normal) Collected: 02/22/21 0321    Lab Status: Final result Specimen: Stool from Per Rectum Updated: 02/22/21 0719    Narrative:      The following orders were created for panel order Clostridium Difficile Toxin - Stool, Per Rectum.  Procedure                               Abnormality         Status                     ---------                               -----------         ------                     Clostridium Difficile To...[[id number]]  Normal              Final result                 Please view results for these tests on the individual orders.    Clostridium Difficile Toxin, PCR - Stool, Per Rectum [[id number]]  (Normal) Collected: 02/22/21 0321    Lab Status: Final result Specimen: Stool from Per Rectum Updated: 02/22/21 0719     C. Difficile Toxins by PCR Not Detected    Narrative:      Performance characteristics of test not established for patients <2 years of age.  Negative for Toxigenic C. Difficile    Respiratory Panel PCR w/COVID-19(SARS-CoV-2) ZAHEER/ALFONSO/EMILIANO/PAD/COR/MAD/HOLLIE In-House, NP Swab in UTM/VTM, 3-4 HR TAT - Swab, Nasopharynx [[id number]]  (Normal) Collected: 02/21/21 1741    Lab Status: Final result Specimen: Swab from Nasopharynx Updated: 02/21/21 1850     ADENOVIRUS, PCR Not Detected     Coronavirus 229E Not Detected     Coronavirus HKU1 Not Detected     Coronavirus NL63 Not Detected     Coronavirus OC43 Not Detected     COVID19 Not Detected     Human Metapneumovirus Not Detected     Human Rhinovirus/Enterovirus Not Detected     Influenza A PCR Not Detected     Influenza B PCR Not Detected     Parainfluenza Virus 1 Not Detected     Parainfluenza Virus 2 Not Detected     Parainfluenza Virus 3 Not Detected     Parainfluenza Virus 4 Not Detected     RSV, PCR Not Detected     Bordetella pertussis pcr Not Detected     Bordetella parapertussis PCR Not Detected     Chlamydophila pneumoniae PCR Not Detected     Mycoplasma pneumo by PCR Not Detected     Narrative:      Fact sheet for providers: https://docs.[employer]/wp-content/uploads/JSY7224-4504-WL2.1-EUA-Provider-Fact-Sheet-3.pdf    Fact sheet for patients: https://docs.[employer]/wp-content/uploads/FYU2296-9623-NF0.1-EUA-Patient-Fact-Sheet-1.pdf    Test performed by PCR.        MRI brain personally reviewed showing large right MCA CVA. Agree with interpretation.  Imaging Results (Last 24 Hours)     Procedure Component Value Units Date/Time    MRI Brain Without Contrast [[id number]] Collected: 03/03/21 0533     Updated: 03/03/21 0536    Narrative:      MRI BRAIN WITHOUT CONTRAST, 3/3/2021    HISTORY:    85-year-old male hospital inpatient with code stroke this morning for altered mentation. Right side facial droop, aphasia, left-sided weakness.    TECHNIQUE:  Multiplanar multisequence MR imaging of the brain without IV contrast.    COMPARISON:  *  CT head, CT perfusion study and CTA head and neck tonight.    FINDINGS:  Large region of restricted diffusion corresponding to the anterior portion of the right MCA territory involving posterior frontal lobe, anterior temporal lobe and anterior insular cortex centered on the sylvian fissure. This corresponds to the superior  division of the MCA with acute occlusion of this vessel just beyond the M1 bifurcation seen on CTA tonight and matches the zone of acute ischemia on CT perfusion.    No additional foci of restricted diffusion elsewhere within the brain. No evidence of hemorrhagic transformation.    No intracranial mass, mass effect, significant cerebral edema or hydrocephalus. Mild chronic small vessel type white matter changes.      Impression:      1.  Large region of acute ischemia corresponding to the anterior portion of the right MCA territory as detailed above.  2.  No additional intracranial lesions.    Signer Name: Edgar Caballero MD   Signed: 3/3/2021 5:33 AM   Workstation Name: [email or web address]    [facility]    CT Angiogram  Head [[id number]] Collected: 03/03/21 0510     Updated: 03/03/21 0512    Narrative:      CT ANGIOGRAM, HEAD AND NECK, 3/3/2021    HISTORY:  85-year-old male hospital inpatient with code stroke call this morning for altered mentation. Right side facial droop, aphasia, left-sided weakness. CTA examination shows large zone of acute ischemia in the anterior portion of the right MCA territory.    TECHNIQUE:  CT angiogram of the head and neck with contrast. 3-D postprocessing was performed and reviewed. Evaluation for a significant carotid arterial stenosis is based on NASCET criteria. Radiation dose reduction techniques included automated exposure control.  Radiation audit for known CT and nuclear cardiology exams in the last 12 months: 2.    COMPARISON:  CT head and CT perfusion exams tonight.    CTA NECK:  The patient has apparently had significant thoracic aorta and great vessel surgery in the past, but no details are available. The images suggest potential prior aortic arch graft and possible prior innominate and common carotid artery grafts with the  left subclavian artery arising from the left common carotid conduit. These vessels are all widely patent with no evidence of stenosis or dissection. Chest vessels are incompletely imaged on this neck study. Both vertebral arteries are widely patent and  codominant.    CTA HEAD:  There is abrupt vessel cut off of the superior division of the right MCA just beyond the bifurcation, and there is poor arterial vascular opacification throughout the posterior right frontal lobe and anterior temporal lobe at the sylvian fissure and the  anterior portion of the insular cortex. This corresponds to the zone of ischemia seen on perfusion examination.    No additional intracranial large vessel occlusion or high-grade stenosis. Normal vascular contrast distribution throughout the remaining anterior, middle and posterior cerebral artery territories. No intracranial aneurysm or vascular  malformation is  identified. Dural venous sinuses appear normal.      Impression:      1.  Abrupt and complete occlusion of the superior division of the right MCA just beyond the M1 bifurcation.  2.  No additional intracranial large vessel occlusion or high-grade stenosis.  3.  Prior thoracic aortic surgery with potential great vessel grafts at the base of the neck (no details available). All vessels in the neck appear widely patent with no narrowing or dissection.    NOTIFICATION: Critical Value/emergent results were telephoned by me to stroke team nurse navigator, Devi, at 05:00 hours on 3/3/2021.    Signer Name: Edgar Caballero MD   Signed: 3/3/2021 5:10 AM   [facility]: [facility]    [facility]    CT Angiogram Neck [[id number]] Collected: 03/03/21 0510     Updated: 03/03/21 0512    Narrative:      CT ANGIOGRAM, HEAD AND NECK, 3/3/2021    HISTORY:  85-year-old male hospital inpatient with code stroke call this morning for altered mentation. Right side facial droop, aphasia, left-sided weakness. CTA examination shows large zone of acute ischemia in the anterior portion of the right MCA territory.    TECHNIQUE:  CT angiogram of the head and neck with contrast. 3-D postprocessing was performed and reviewed. Evaluation for a significant carotid arterial stenosis is based on NASCET criteria. Radiation dose reduction techniques included automated exposure control.  Radiation audit for known CT and nuclear cardiology exams in the last 12 months: 2.    COMPARISON:  CT head and CT perfusion exams tonight.    CTA NECK:  The patient has apparently had significant thoracic aorta and great vessel surgery in the past, but no details are available. The images suggest potential prior aortic arch graft and possible prior innominate and common carotid artery grafts with the  left subclavian artery arising from the left common carotid conduit. These vessels are all widely patent with no evidence  of stenosis or dissection. Chest vessels are incompletely imaged on this neck study. Both vertebral arteries are widely patent and  codominant.    CTA HEAD:  There is abrupt vessel cut off of the superior division of the right MCA just beyond the bifurcation, and there is poor arterial vascular opacification throughout the posterior right frontal lobe and anterior temporal lobe at the sylvian fissure and the  anterior portion of the insular cortex. This corresponds to the zone of ischemia seen on perfusion examination.    No additional intracranial large vessel occlusion or high-grade stenosis. Normal vascular contrast distribution throughout the remaining anterior, middle and posterior cerebral artery territories. No intracranial aneurysm or vascular malformation is  identified. Dural venous sinuses appear normal.      Impression:      1.  Abrupt and complete occlusion of the superior division of the right MCA just beyond the M1 bifurcation.  2.  No additional intracranial large vessel occlusion or high-grade stenosis.  3.  Prior thoracic aortic surgery with potential great vessel grafts at the base of the neck (no details available). All vessels in the neck appear widely patent with no narrowing or dissection.    NOTIFICATION: Critical Value/emergent results were telephoned by me to stroke team nurse navigatorDevi, at 05:00 hours on 3/3/2021.    Signer Name: Edgar Caballero MD   Signed: 3/3/2021 5:10 AM   Workstation Name: LEIGH [email or web address]    [facility]    CT Cerebral Perfusion With & Without Contrast [[id number]] Collected: 03/03/21 0454     Updated: 03/03/21 0457    Narrative:      CT CEREBRAL PERFUSION, WITH AND WITHOUT CONTRAST, 3/3/2021    HISTORY:   85-year-old male hospital inpatient with code stroke call this morning for altered mentation. Right side facial droop, aphasia, left-sided weakness    TECHNIQUE:   Axial CT images of the brain without and with intravenous contrast using  cerebral perfusion protocol. Post-processing parametric maps were created using RAPID software and reviewed. Radiation dose reduction techniques included automated exposure control  or exposure modulation based on body size. CT and nuclear cardiology exams in the last 12 months: 2.    COMPARISON:   CT head tonight.    FINDINGS:   Arterial input function is optimal.    Region of acute ischemia in the anterior portion of the right MCA distribution in the territory of the superior MCA division with smaller region of core infarction. This involves the frontal and temporal opercula and anterior operculum.    PERFUSION PARAMETERS:  *  Ischemic tissue volume (Tmax > 6 sec.): 64 mL.  *  Infarct core volume (CBF < 30%): 23 mL.  *  Mismatch (penumbra) volume: 41 mL.  *  Mismatch ratio: 2.8.  *  Location/territory: Anterior portion right MCA.    COLLATERAL CIRCULATION PARAMETERS:  *  Volume poor collateral perfusion (Tmax > 10 sec.): 38 mL.  *  Hypoperfusion index (Tmax >10 sec./Tmax > 6 sec.): 0.6.  *  CBV Index (rCBV in Tmax > 6 sec. region): 0.6.        Impression:      1.  Acute ischemia in the anterior portion of the right MCA distribution centered on the frontal and temporal operculum likely involving superior MCA division.  2.  Smaller region of core infarction within the region of ischemia as detailed above.        Signer Name: Edgar Caballero MD   Signed: 3/3/2021 4:54 AM   Workstation Name: [email or web address]-    [facility] Chest 1 View [[id number]] Collected: 03/03/21 0444     Updated: 03/03/21 0447    Narrative:      CHEST X-RAY, 3/3/2021      HISTORY:    85-year-old male hospital inpatient undergoing acute stroke evaluation. New onset neurologic deficit tonight.      TECHNIQUE:  AP portable chest x-ray.      FINDINGS:  Mildly prominent and indistinct diffuse interstitial markings may represent mild pulmonary edema. There is more dense airspace opacification of the left lower lobe which  may represent lung consolidation and/or left pleural effusion.    Chronic pleural calcification at the right lung base and along the descending thoracic aorta. Surgical clip at the level of the aortic arch. Chronic pleural thickening at the left lung apex.      Impression:      Suspected mild vascular congestion. Left lung base consolidation and/or pleural effusion.    Signer Name: Edgar Caballero MD   Signed: 3/3/2021 4:44 AM   Workstation Name: [facility]    [facility]    CT Head Without Contrast Stroke Protocol [[id number]] Collected: 03/03/21 0436     Updated: 03/03/21 0439    Narrative:      CT HEAD, NONCONTRAST, STROKE PROTOCOL, 3/3/2021    HISTORY:  85-year-old male hospital inpatient with code stroke call this morning for altered mentation. Right side facial droop, aphasia, left-sided weakness    TECHNIQUE:  CT imaging of the head without IV contrast. Radiation dose reduction techniques included automated exposure control. Radiation audit for CT and nuclear cardiology exams in the last 12 months: 2.  *  CT exam time, 04:27.  *  CT images in PACS for interpretation, 04:32.  *  Stat telephone preliminary report, 04:34.    FINDINGS:  No visible acute intracranial abnormality.    Mild generalized age-appropriate cerebral volume loss. Mild diffuse chronic small vessel type white matter changes.    No evidence of acute intracranial hemorrhage. No visible mass, mass effect, cerebral edema, extra-axial fluid collection or hydrocephalus.      Impression:      1. No acute intracranial abnormality.  2. Mild diffuse chronic changes as noted above.    NOTIFICATION: Critical Value/emergent results were relayed from me to the ED treatment team by telephone through the CT technologist, Hetal, at 04:34 on 3/3/2021.    Signer Name: Edgar Caballero MD   Signed: 3/3/2021 4:36 AM   Workstation Name: [facility]    [facility]               I have reviewed the  medications:  Scheduled Meds:atorvastatin, 80 mg, Oral, Nightly  budesonide-formoterol, 2 puff, Inhalation, BID - RT  carbidopa-levodopa, 2 tablet, Oral, TID  Divalproex Sodium, 125 mg, Oral, Q6H  docusate sodium, 100 mg, Oral, BID  escitalopram, 20 mg, Oral, Daily  folic acid, 1 mg, Oral, Daily  levothyroxine, 75 mcg, Oral, Q AM  nystatin, , Topical, Q12H  pantoprazole, 40 mg, Intravenous, Q12H  penicillin v potassium, 500 mg, Oral, Q12H  polyethylene glycol, 17 g, Oral, Daily  pramipexole, 0.25 mg, Oral, Daily  QUEtiapine, 12.5 mg, Oral, Nightly  sodium chloride, 10 mL, Intravenous, Q12H  sodium chloride, 10 mL, Intravenous, Q12H  topiramate, 100 mg, Oral, Daily      Continuous Infusions:lactated ringers, 9 mL/hr, Last Rate: 9 mL/hr (02/24/21 1024)  lactated ringers, 20 mL/hr, Last Rate: 20 mL/hr (03/01/21 1432)      PRN Meds:.•  acetaminophen  •  albuterol  •  bisacodyl  •  hydrALAZINE  •  polyvinyl alcohol  •  potassium chloride **OR** potassium chloride **OR** potassium chloride  •  sodium chloride  •  sodium chloride  •  sodium chloride    Assessment/Plan   Assessment & Plan     Active Hospital Problems    Diagnosis  POA   • **Colitis [K52.9]  Yes   • Acute lower GI bleeding [K92.2]  Unknown   • CHF (congestive heart failure) (CMS/[facility]) [I50.9]  Yes   • Bipolar 1 disorder (CMS/[facility]) [F31.9]  Yes   • Dementia (CMS/[facility]) [F03.90]  Yes   • Parkinson's disease (CMS/[facility]) [G20]  Yes   • Depression [F32.9]  Yes   • Hypertension [I10]  Yes   • Chronic asthma [J45.909]  Yes   • H/O aortic aneurysm repair [Z98.890, Z86.79]  Not Applicable      Resolved Hospital Problems   No resolved problems to display.        Brief Hospital Course to date:  Amador White is a 85 y.o. male with history of Parkinson's disease, dementia, hypertension AAA, CHF, recent hospitalization for persistent fever suspected secondary to UTI/pneumonia currently on IV antibiotics.  Patient presents to the ED with BRBPR found to have colitis/proctitis,  admitted with GI bleed secondary to stercoral ulcers. Had recurrent bleeding 3/1. Unfortunately patient had acute neurological decompensation 3/3 and was found to have massive right MCA stroke.    Metabolic encephalopathy  Large right MCA stroke  --I had a long d/w patient's wife this am. She expressed multiple times to me that more than anything she wants to make patient comfortable with which I agree. He seems comfortable at time of my exam. Have consulted palliative as well as hospice.  --I also d/w SLP. Patient already had severe dysphagia at baseline but now is unable to initiate swallow at all and is not even safe for comfort diet. He appears to be actively dying and given his baseline dementia and overall clinical picture a PEG is not indicated.  --Cannot have asa due to GIB. NPO so no statin.  --D/C tele.    Severe Dysphagia  -Prior to his CVA the patient was already having issues with aspiraiton. My partner discussed ongoing risk of aspiration from dysphagia with both the patient and his wife this morning at bedside.  We again considered the possibility of a PEG tube placement.  At that time Mr. Whtie's wife, who makes medical decisions for him, feels that withholding food and instead giving enteral feeds would remove the great robert in life that eating often provides. Given his acute decline I would no longer even recommend PEG. Have d/w spouse.    Rectal bleeding, recurrent  Acute blood loss anemia  Dieulafoy lesion  -GI following, he is s/p C scope 2/25 with findings of multiple stercoral ulcers, likely a sequela of his recent severe constipation. Repeat flex sig 3/1 showed improvement in stercoral ulcers but with Dieulafoy lesion which was clipped and was likely source of bleeding. H/H has remained stable.  -Continue to hold asa  -CBC in am.     Proctocolitis  -CT abdomen pelvis does reveal marked wall thickening and edema of sigmoid colon and rectum with severe fecal loading, concerning for colitis and  proctitis, C. difficile toxin is negative  -Continue antibiotics. Was switched to PO abx yesterday but cannot take PO at this time. Given overall GOC will not restart IV abx.  -Pathology from colonoscopy concerning for fragments of tubular adenoma - GI recommends surveillance flex sig in 3 months to evaluate healing of the ulcers and evaluate for carpet polyp     Recent enterococcal septicemia  -Patient recently hospitalized at [facility], discharged on IV ampicillin,  -Discussed with Dr. 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  -ABX as above.     Thrombocytopenia, Coagulopathy  -Suspect malnutrition. Received s/p Vitamin K x1 w/ improvement in INR.  -TCP due to blood loss, better. Monitor.     Chronic left IJ thrombus  -Suspect secondary to recent PICC line in the left arm (in place for 6 weeks, now removed).  Given that the thrombus appears chronic, combined with Mr White's recent GI bleeding, ongoing anemia and thrombocytopenia, will defer anticoagulation at present as the risks appear to outweigh benefits. My partner discussed with patient and spouse at bedside, they are agreeable with this plan.  Hold asa as above.     Hypertension  Hyperlipidemia  Hypothyroidism  Hypokalemia and hypocalcemia  Abdominal aortic aneurysm  Parkinson's disease, dementia   Depression and bipolar disorder   Prolonged QTC     Unintentional weight loss  - 20 pound weight loss since December.  Asked dietitian to see-patient may not be consuming adequate calories at present.      This patient's problems and plans were partially entered by my partner and updated as appropriate by me 03/03/21.     DVT Prophylaxis: Mechanical    CODE STATUS:   Code Status and Medical Interventions:   Ordered at: 03/03/21 0572     Limited Support to NOT Include:    Cardioversion/Defibrillation    Dialysis    Intubation    Vasopressors    Antiarrhythmic Drugs     Level  Of Support Discussed With:    Health Care Surrogate     Code Status:    No CPR     Medical Interventions (Level of Support Prior to Arrest):    Limited       Naomie Rivero II, DO  03/03/21

## 2021-03-03 NOTE — CONSULTS
Chart review for diabetes educator consult.    At the time of this review patient A1c is 4.9 , they have no noted history of diabetes and no home medications noted for treatment of diabetes. At this time we do not feel the patient would benefit from diabetes education. Thank you for this consult, should patient needs change please re consult us. Thank you for this referral

## 2021-03-03 NOTE — CONSULTS
Mandy Shahid MD  Consulting physician: Kristan Acosta  Reason for referral: goc discussion, ACP, hospice discussion  Chief Complaint   Patient presents with   • Rectal Bleeding     HPI:   84 yo male with a known diagnosis of HTN, HLD, AAA s/p repair (1993), Parkinson's, dementia, CHF, hypothyroidism, depression and bipolar disorder who was admitted to [facility] on 2/21/2021 with BRBPR. Patient found to have colitis/proctitis with bleed secondary to rectal stercoral ulcers and  per colonoscopy on 2/25 and sigmoidoscopy on 3/1 found Dieulafoy ulcer s/p clipping. Gastroenterology signed off on 3/2. Patient has been managed for dysphagia with discussion about ongoing management with underlying dementia. Early this am, 3/3, patient had acute neurologic decline (Left-sided deficits, eye deviation, aphasia and dysarthria) with code stroke. MRI brain showed large region of acute ischemia to anterior portion of the R MCA territory. CTA head, neck show complete occlusion of superior division of Right MCA just beyond M1 bifurcation.  Dr Rivero met with patient's wife and updated on current clinical status and expected outcomes with recognition of further impact on any functional recovery.    Symptoms:   Wife, Sandra, at bedside. She sees patient to be comfortable.   Noted some restlessness with movement of both upper extremities and increase breathing effort.  Advance care planning discussed:   Code Status:   Current Code Status     Date Active Code Status Order ID Comments User Context       3/3/2021 0558 No CPR [id number]  Kristan Acosta APRN Inpatient     Advance Care Planning Activity      Questions for Current Code Status     Question Answer Comment    Code Status No CPR     Medical Interventions (Level of Support Prior to Arrest) Limited     Limited Support to NOT Include Cardioversion/Defibrillation      Dialysis      Intubation      Vasopressors      Antiarrhythmic Drugs     Level Of Support Discussed With Health  Care Surrogate         Advance Directive: none on medical record  Surrogate decision maker: SHIRIN, wife Sandra  Past Medical History:   Diagnosis Date   • Arthritis    • Asthma    • Bipolar 1 disorder (CMS/[facility])    • CHF (congestive heart failure) (CMS/[facility])    • COPD (chronic obstructive pulmonary disease) (CMS/[facility])    • Dementia (CMS/[facility])    • Depression    • Disease of thyroid gland    • H/O chest x-ray 08/08/2012    Left diaphragm to be elevated chronically. There is evidence of previous surgery on left with line of clips along the left hilum. There is large thoracic aortic aneurysm overlying the chest. There is a graft into the left hilum and a clip in left paratracheal region. There are mirco calcifications in right base. Apices appear hyperlucent, right more so that left. I cannot rule out bullous disease   • Hyperlipidemia    • Hypertension    • Insomnia    • Parkinson's disease (CMS/[facility])    • RLS (restless legs syndrome)    • Tremor of hands and face     Follwed by Dr. Dumont     Past Surgical History:   Procedure Laterality Date   • ABDOMINAL AORTIC ANEURYSM REPAIR      1993   • CATARACT EXTRACTION, BILATERAL  2017   • CHOLECYSTECTOMY     • COLONOSCOPY N/A 2/24/2021    Procedure: COLONOSCOPY;  Surgeon: Brunner, Mark I, MD;  Location:  [facility];  Service: Gastroenterology;  Laterality: N/A;   • EYE SURGERY     • LARYNX SURGERY     • SIGMOIDOSCOPY N/A 3/1/2021    Procedure: SIGMOIDOSCOPY FLEXIBLE;  Surgeon: Brunner, Mark I, MD;  Location:  [facility];  Service: Gastroenterology;  Laterality: N/A;   • TONSILLECTOMY AND ADENOIDECTOMY         Reviewed current scheduled and prn medications for route, type, dose and frequency.    Current Facility-Administered Medications   Medication Dose Route Frequency Provider Last Rate Last Admin   • acetaminophen (TYLENOL) tablet 650 mg  650 mg Oral Q6H PRN Brunner, Mark I, MD   650 mg at 02/23/21 1333   • albuterol (PROVENTIL) nebulizer solution 0.083% 2.5 mg/3mL  2.5  mg Nebulization Q6H PRN Brunner, Mark I, MD       • atorvastatin (LIPITOR) tablet 80 mg  80 mg Oral Nightly Kristan Acosta APRN       • bisacodyl (DULCOLAX) suppository 10 mg  10 mg Rectal Daily PRN Ermelinda Russell PA-C       • budesonide-formoterol (SYMBICORT) 160-4.5 MCG/ACT inhaler 2 puff  2 puff Inhalation BID - RT Brunner, Mark I, MD   2 puff at 03/03/21 0705   • carbidopa-levodopa (SINEMET)  MG per tablet 2 tablet  2 tablet Oral TID Brunner, Mark I, MD   Stopped at 03/03/21 0903   • Divalproex Sodium (DEPAKOTE SPRINKLE) capsule 125 mg  125 mg Oral Q6H Brunner, Mark I, MD   125 mg at 03/02/21 2353   • docusate sodium (COLACE) capsule 100 mg  100 mg Oral BID Ermelinda Russell PA-C   Stopped at 03/03/21 0904   • escitalopram (LEXAPRO) tablet 20 mg  20 mg Oral Daily Brunner, Mark I, MD   Stopped at 03/03/21 0903   • folic acid (FOLVITE) tablet 1 mg  1 mg Oral Daily Brunner, Mark I, MD   Stopped at 03/03/21 0904   • hydrALAZINE (APRESOLINE) injection 10 mg  10 mg Intravenous Q6H PRN Brunner, Mark I, MD       • lactated ringers infusion  9 mL/hr Intravenous Continuous Brunner, Mark I, MD 9 mL/hr at 02/24/21 1024 New Bag at 02/24/21 1148   • lactated ringers solution  20 mL/hr Intravenous Continuous Brunner, Mark I, MD 20 mL/hr at 03/01/21 1432 250 mL at 03/01/21 1548   • levothyroxine (SYNTHROID, LEVOTHROID) tablet 75 mcg  75 mcg Oral Q AM Brunner, Mark I, MD   75 mcg at 03/02/21 0545   • nystatin (MYCOSTATIN) powder   Topical Q12H Brunner, Mark I, MD   Given at 03/03/21 0929   • pantoprazole (PROTONIX) injection 40 mg  40 mg Intravenous Q12H Brunner, Mark I, MD   40 mg at 03/03/21 0929   • penicillin v potassium (VEETID) tablet 500 mg  500 mg Oral Q12H Raphael Carlos MD   Stopped at 03/03/21 0904   • polyethylene glycol (MIRALAX) packet 17 g  17 g Oral Daily Brunner, Mark I, MD   Stopped at 03/03/21 0904   • polyvinyl alcohol (LIQUIFILM) 1.4 % ophthalmic solution 1 drop  1 drop Both Eyes Q1H PRN  Naomie Rivero II, DO       • potassium chloride (MICRO-K) CR capsule 40 mEq  40 mEq Oral PRN Brunner, Mark I, MD   40 mEq at 02/25/21 1827    Or   • potassium chloride (KLOR-CON) packet 40 mEq  40 mEq Oral PRN Brunner, Mark I, MD   40 mEq at 02/26/21 0629    Or   • potassium chloride 10 mEq in 100 mL IVPB  10 mEq Intravenous Q1H PRN Brunner, Mark I,  mL/hr at 02/22/21 0542 10 mEq at 02/22/21 0542   • pramipexole (MIRAPEX) tablet 0.25 mg  0.25 mg Oral Daily Brunner, Mark I, MD   Stopped at 03/03/21 0904   • QUEtiapine (SEROquel) tablet 12.5 mg  12.5 mg Oral Nightly Brunner, Mark I, MD   12.5 mg at 03/02/21 2029   • sodium chloride 0.9 % flush 10 mL  10 mL Intravenous PRN Brunner, Mark I, MD       • sodium chloride 0.9 % flush 10 mL  10 mL Intravenous Q12H Brunner, Mark I, MD   10 mL at 03/03/21 0929   • sodium chloride 0.9 % flush 10 mL  10 mL Intravenous PRN Brunner, Mark I, MD       • sodium chloride 0.9 % flush 10 mL  10 mL Intravenous Q12H Kristan Acosta APRN       • sodium chloride 0.9 % flush 10 mL  10 mL Intravenous PRN Kristan Acosta APRN       • topiramate (TOPAMAX) tablet 100 mg  100 mg Oral Daily Brunner, Mark I, MD   Stopped at 03/03/21 0904     lactated ringers, 9 mL/hr, Last Rate: 9 mL/hr (02/24/21 1024)  lactated ringers, 20 mL/hr, Last Rate: 20 mL/hr (03/01/21 1432)      •  acetaminophen  •  albuterol  •  bisacodyl  •  hydrALAZINE  •  polyvinyl alcohol  •  potassium chloride **OR** potassium chloride **OR** potassium chloride  •  sodium chloride  •  sodium chloride  •  sodium chloride  Allergies   Allergen Reactions   • Zolpidem Hallucinations     Family History   Problem Relation Age of Onset   • Heart failure Mother         Congestive   • Heart disease Father         CAD   • Asthma Daughter         history of allergies and asthma   • Aneurysm Sister    • Cancer Sister         colon     Social History     Socioeconomic History   • Marital status:      Spouse name: Not on file  "  • Number of children: Not on file   • Years of education: Not on file   • Highest education level: Not on file   Tobacco Use   • Smoking status: Former Smoker     Packs/day: 0.50     Years: 10.00     Pack years: 5.00     Types: Cigarettes     Quit date: 1966     Years since quittin.2   • Smokeless tobacco: Never Used   Substance and Sexual Activity   • Alcohol use: No   • Drug use: No   • Sexual activity: Defer       Review of Systems - +/- per HPI and symptom review.    PPS: 10%  /53 (BP Location: Left arm, Patient Position: Lying)   Pulse 71   Temp 98 °F (36.7 °C) (Oral)   Resp 16   Ht 172.7 cm (68\")   Wt 68.6 kg (151 lb 3.2 oz)   SpO2 97%   BMI 22.99 kg/m²   68.6 kg (151 lb 3.2 oz) Body mass index is 22.99 kg/m².  Intake & Output (last day)        07 -  0700  07 -  0700    P.O. 160     IV Piggyback      Total Intake(mL/kg) 160 (2.3)     Urine (mL/kg/hr) 200 (0.1)     Total Output 200     Net -40           Urine Unmeasured Occurrence 2 x     Stool Unmeasured Occurrence 1 x           Physical Exam:  General Appearance: No acute distress, ill appearing  Head: Normocephalic without obvious abnormality, atraumatic  Eyes: Conjunctivae and sclerae normal, no icterus, HELENA  Lungs: Clear to auscultation, respirations regular, even, shallow and slightly labored  Heart: Regular rhythm and normal rate, normal S1  Abdomen: Normal bowel sounds, soft, non-distended, non-tender  Extremities: Moves upper extremities, no redness, no cyanosis, no edema  Pulses: Distal pulses palpable and equal bilaterally  Skin: Warm and dry  Neurological: eyes closed, responds with repetitive short replies, no myoclonus, unable to follow commands  Reviewed labs and diagnostic results.  Lab Results   Component Value Date    HGBA1C 4.90 2021     Results from last 7 days   Lab Units 21  0418   WBC 10*3/mm3 4.27   HEMOGLOBIN g/dL 7.7*   HEMATOCRIT % 25.7*   PLATELETS 10*3/mm3 150     Results " from last 7 days   Lab Units 03/03/21  0418   SODIUM mmol/L 141   POTASSIUM mmol/L 2.9*   CHLORIDE mmol/L 107   CO2 mmol/L 29.0   BUN mg/dL 11   CREATININE mg/dL 0.47*   CALCIUM mg/dL 7.6*   BILIRUBIN mg/dL 0.4   ALK PHOS U/L 83   ALT (SGPT) U/L <5   AST (SGOT) U/L 8   GLUCOSE mg/dL 81     Results from last 7 days   Lab Units 03/03/21  0418   SODIUM mmol/L 141   POTASSIUM mmol/L 2.9*   CHLORIDE mmol/L 107   CO2 mmol/L 29.0   BUN mg/dL 11   CREATININE mg/dL 0.47*   GLUCOSE mg/dL 81   CALCIUM mg/dL 7.6*     Imaging Results (Last 72 Hours)     Procedure Component Value Units Date/Time    MRI Brain Without Contrast [[id number]] Collected: 03/03/21 0533     Updated: 03/03/21 0536    Narrative:      MRI BRAIN WITHOUT CONTRAST, 3/3/2021    HISTORY:    85-year-old male hospital inpatient with code stroke this morning for altered mentation. Right side facial droop, aphasia, left-sided weakness.    TECHNIQUE:  Multiplanar multisequence MR imaging of the brain without IV contrast.    COMPARISON:  *  CT head, CT perfusion study and CTA head and neck tonight.    FINDINGS:  Large region of restricted diffusion corresponding to the anterior portion of the right MCA territory involving posterior frontal lobe, anterior temporal lobe and anterior insular cortex centered on the sylvian fissure. This corresponds to the superior  division of the MCA with acute occlusion of this vessel just beyond the M1 bifurcation seen on CTA tonight and matches the zone of acute ischemia on CT perfusion.    No additional foci of restricted diffusion elsewhere within the brain. No evidence of hemorrhagic transformation.    No intracranial mass, mass effect, significant cerebral edema or hydrocephalus. Mild chronic small vessel type white matter changes.      Impression:      1.  Large region of acute ischemia corresponding to the anterior portion of the right MCA territory as detailed above.  2.  No additional intracranial lesions.    Signer Name:  Edgar Caballero MD   Signed: 3/3/2021 5:33 AM   Workstation Name: [facility]-    [facility]    CT Angiogram Head [[id number]] Collected: 03/03/21 0510     Updated: 03/03/21 0512    Narrative:      CT ANGIOGRAM, HEAD AND NECK, 3/3/2021    HISTORY:  85-year-old male hospital inpatient with code stroke call this morning for altered mentation. Right side facial droop, aphasia, left-sided weakness. CTA examination shows large zone of acute ischemia in the anterior portion of the right MCA territory.    TECHNIQUE:  CT angiogram of the head and neck with contrast. 3-D postprocessing was performed and reviewed. Evaluation for a significant carotid arterial stenosis is based on NASCET criteria. Radiation dose reduction techniques included automated exposure control.  Radiation audit for known CT and nuclear cardiology exams in the last 12 months: 2.    COMPARISON:  CT head and CT perfusion exams tonight.    CTA NECK:  The patient has apparently had significant thoracic aorta and great vessel surgery in the past, but no details are available. The images suggest potential prior aortic arch graft and possible prior innominate and common carotid artery grafts with the  left subclavian artery arising from the left common carotid conduit. These vessels are all widely patent with no evidence of stenosis or dissection. Chest vessels are incompletely imaged on this neck study. Both vertebral arteries are widely patent and  codominant.    CTA HEAD:  There is abrupt vessel cut off of the superior division of the right MCA just beyond the bifurcation, and there is poor arterial vascular opacification throughout the posterior right frontal lobe and anterior temporal lobe at the sylvian fissure and the  anterior portion of the insular cortex. This corresponds to the zone of ischemia seen on perfusion examination.    No additional intracranial large vessel occlusion or high-grade stenosis. Normal vascular  contrast distribution throughout the remaining anterior, middle and posterior cerebral artery territories. No intracranial aneurysm or vascular malformation is  identified. Dural venous sinuses appear normal.      Impression:      1.  Abrupt and complete occlusion of the superior division of the right MCA just beyond the M1 bifurcation.  2.  No additional intracranial large vessel occlusion or high-grade stenosis.  3.  Prior thoracic aortic surgery with potential great vessel grafts at the base of the neck (no details available). All vessels in the neck appear widely patent with no narrowing or dissection.    NOTIFICATION: Critical Value/emergent results were telephoned by me to stroke team nurse navigatorDevi, at 05:00 hours on 3/3/2021.    Signer Name: Edgar Caballero MD   Signed: 3/3/2021 5:10 AM   Workstation Name: [email or web address]    [facility]    CT Angiogram Neck [[id number]] Collected: 03/03/21 0510     Updated: 03/03/21 0512    Narrative:      CT ANGIOGRAM, HEAD AND NECK, 3/3/2021    HISTORY:  85-year-old male hospital inpatient with code stroke call this morning for altered mentation. Right side facial droop, aphasia, left-sided weakness. CTA examination shows large zone of acute ischemia in the anterior portion of the right MCA territory.    TECHNIQUE:  CT angiogram of the head and neck with contrast. 3-D postprocessing was performed and reviewed. Evaluation for a significant carotid arterial stenosis is based on NASCET criteria. Radiation dose reduction techniques included automated exposure control.  Radiation audit for known CT and nuclear cardiology exams in the last 12 months: 2.    COMPARISON:  CT head and CT perfusion exams tonight.    CTA NECK:  The patient has apparently had significant thoracic aorta and great vessel surgery in the past, but no details are available. The images suggest potential prior aortic arch graft and possible prior innominate and common carotid  artery grafts with the  left subclavian artery arising from the left common carotid conduit. These vessels are all widely patent with no evidence of stenosis or dissection. Chest vessels are incompletely imaged on this neck study. Both vertebral arteries are widely patent and  codominant.    CTA HEAD:  There is abrupt vessel cut off of the superior division of the right MCA just beyond the bifurcation, and there is poor arterial vascular opacification throughout the posterior right frontal lobe and anterior temporal lobe at the sylvian fissure and the  anterior portion of the insular cortex. This corresponds to the zone of ischemia seen on perfusion examination.    No additional intracranial large vessel occlusion or high-grade stenosis. Normal vascular contrast distribution throughout the remaining anterior, middle and posterior cerebral artery territories. No intracranial aneurysm or vascular malformation is  identified. Dural venous sinuses appear normal.      Impression:      1.  Abrupt and complete occlusion of the superior division of the right MCA just beyond the M1 bifurcation.  2.  No additional intracranial large vessel occlusion or high-grade stenosis.  3.  Prior thoracic aortic surgery with potential great vessel grafts at the base of the neck (no details available). All vessels in the neck appear widely patent with no narrowing or dissection.    NOTIFICATION: Critical Value/emergent results were telephoned by me to stroke team nurse navigator, Devi, at 05:00 hours on 3/3/2021.    Signer Name: Edgar Caballero MD   Signed: 3/3/2021 5:10 AM   Workstation Name: [email or web address]    [facility]    CT Cerebral Perfusion With & Without Contrast [[id number]] Collected: 03/03/21 0454     Updated: 03/03/21 0457    Narrative:      CT CEREBRAL PERFUSION, WITH AND WITHOUT CONTRAST, 3/3/2021    HISTORY:   85-year-old male hospital inpatient with code stroke call this morning for altered  mentation. Right side facial droop, aphasia, left-sided weakness    TECHNIQUE:   Axial CT images of the brain without and with intravenous contrast using cerebral perfusion protocol. Post-processing parametric maps were created using RAPID software and reviewed. Radiation dose reduction techniques included automated exposure control  or exposure modulation based on body size. CT and nuclear cardiology exams in the last 12 months: 2.    COMPARISON:   CT head tonight.    FINDINGS:   Arterial input function is optimal.    Region of acute ischemia in the anterior portion of the right MCA distribution in the territory of the superior MCA division with smaller region of core infarction. This involves the frontal and temporal opercula and anterior operculum.    PERFUSION PARAMETERS:  *  Ischemic tissue volume (Tmax > 6 sec.): 64 mL.  *  Infarct core volume (CBF < 30%): 23 mL.  *  Mismatch (penumbra) volume: 41 mL.  *  Mismatch ratio: 2.8.  *  Location/territory: Anterior portion right MCA.    COLLATERAL CIRCULATION PARAMETERS:  *  Volume poor collateral perfusion (Tmax > 10 sec.): 38 mL.  *  Hypoperfusion index (Tmax >10 sec./Tmax > 6 sec.): 0.6.  *  CBV Index (rCBV in Tmax > 6 sec. region): 0.6.        Impression:      1.  Acute ischemia in the anterior portion of the right MCA distribution centered on the frontal and temporal operculum likely involving superior MCA division.  2.  Smaller region of core infarction within the region of ischemia as detailed above.        Signer Name: Edgar Caballero MD   Signed: 3/3/2021 4:54 AM   Workstation Name: [email or web address]-    [facility]    XR Chest 1 View [[id number]] Collected: 03/03/21 0444     Updated: 03/03/21 0447    Narrative:      CHEST X-RAY, 3/3/2021      HISTORY:    85-year-old male hospital inpatient undergoing acute stroke evaluation. New onset neurologic deficit tonight.      TECHNIQUE:  AP portable chest x-ray.      FINDINGS:  Mildly prominent  and indistinct diffuse interstitial markings may represent mild pulmonary edema. There is more dense airspace opacification of the left lower lobe which may represent lung consolidation and/or left pleural effusion.    Chronic pleural calcification at the right lung base and along the descending thoracic aorta. Surgical clip at the level of the aortic arch. Chronic pleural thickening at the left lung apex.      Impression:      Suspected mild vascular congestion. Left lung base consolidation and/or pleural effusion.    Signer Name: Edgar Caballero MD   Signed: 3/3/2021 4:44 AM   Workstation [facility]: [facility]    [facility]    CT Head Without Contrast Stroke Protocol [[id number]] Collected: 03/03/21 0436     Updated: 03/03/21 0439    Narrative:      CT HEAD, NONCONTRAST, STROKE PROTOCOL, 3/3/2021    HISTORY:  85-year-old male hospital inpatient with code stroke call this morning for altered mentation. Right side facial droop, aphasia, left-sided weakness    TECHNIQUE:  CT imaging of the head without IV contrast. Radiation dose reduction techniques included automated exposure control. Radiation audit for CT and nuclear cardiology exams in the last 12 months: 2.  *  CT exam time, 04:27.  *  CT images in PACS for interpretation, 04:32.  *  Stat telephone preliminary report, 04:34.    FINDINGS:  No visible acute intracranial abnormality.    Mild generalized age-appropriate cerebral volume loss. Mild diffuse chronic small vessel type white matter changes.    No evidence of acute intracranial hemorrhage. No visible mass, mass effect, cerebral edema, extra-axial fluid collection or hydrocephalus.      Impression:      1. No acute intracranial abnormality.  2. Mild diffuse chronic changes as noted above.    NOTIFICATION: Critical Value/emergent results were relayed from me to the ED treatment team by telephone through the CT technologistHetal, at 04:34 on 3/3/2021.    Signer Name: Edgar  MD Federico   Signed: 3/3/2021 4:36 AM   Workstation Name: [email or web address]-    [facility]        Active Hospital Problems       Diagnosis   POA   • **Colitis [K52.9]   Yes   • Acute lower GI bleeding [K92.2]   Unknown   • CHF (congestive heart failure) (CMS/[facility]) [I50.9]   Yes   • Bipolar 1 disorder (CMS/[facility]) [F31.9]   Yes   • Dementia (CMS/[facility]) [F03.90]   Yes   • Parkinson's disease (CMS/[facility]) [G20]   Yes   • Depression [F32.9]   Yes   • Hypertension [I10]   Yes   • Chronic asthma [J45.909]   Yes   • H/O aortic aneurysm repair [Z98.890, Z86.79]           Acute R MCA ischemic stroke    Impression: 85 y.o. male with acute R MCA ischemic stroke, colitis, GIB s/p clipping Dieulafoy ulcer  Plan:   Dysphagia - complicated with acute large Right MCA stroke    Restlessness, dyspnea - prn iv lorazepam and morphine    Goals of care discussion - wife sitting at bedside, holding patient's hand, tearful.  She has had discussion with Dr Rivero.  Listened and answered questions about current status, expected outcomes and changes with plan of care and recognition of end of life.   Patient and wife have three daughters who all live in [place].   Patient's wife is undergoing treatment for breast cancer with radiation on Friday.   Patient's daughter called to speak with patient's wife, so left room.   Hospice case manager to follow up.  Palliative team provide support to patient/family     María Elena Franco, APRN  [phone]  03/03/21  10:59 EST      Time: 60  minutes spent reviewing medical and medication records, assessing and examining patient, discussing with patient and family, answering questions, formulating a plan and documentation of care. > 50% time spent face to face

## 2021-03-03 NOTE — PROGRESS NOTES
Continued Stay Note  [facility]     Patient Name: Amador White  MRN: [id number]  Today's Date: 3/3/2021    Admit Date: 2/21/2021    Discharge Plan     Row Name 03/03/21 1515       Plan    Plan  Hospice consult received. Chart reviewed. In pt. Hospice will be meeting with the spouse tomorrow morning to assess for in pt. criteria & a potential in pt. hospice admission.        Discharge Codes    No documentation.             Hoda Dumas     Former smoker Prednisone Pregnancy And Lactation Text: This medication is Pregnancy Category C and it isn't know if it is safe during pregnancy. This medication is excreted in breast milk.

## 2021-03-04 PROBLEM — I63.511 ACUTE RIGHT MCA STROKE (HCC): Status: ACTIVE | Noted: 2021-01-01

## 2021-03-04 NOTE — PLAN OF CARE
Goal Outcome Evaluation:     Progress: no change  Outcome Summary: Received from telemetry. Awake but nonverbal. Medicated with prns for discomfort. Left arm is edematous and elevated on pillow. No family in room. Valencia in place. Did accept ice chip upon arrival to unit.

## 2021-03-04 NOTE — DISCHARGE PLACEMENT REQUEST
"Shubham White (85 y.o. Male)     Date of Birth Social Security Number Address Home Phone MRN    1935  [address] [place] [id number] [phone] [id number]    [employer] Marital Status          Unknown        Admission Date Admission Type Admitting Provider Attending Provider Department, Room/Bed    2/21/21 Emergency Anthony Agustin MD Schnell, Aaron, MD [facility] 3E, S334/1    Discharge Date Discharge Disposition Discharge Destination                       Attending Provider: Anthony Agustin MD    Allergies: Zolpidem    Isolation: None   Infection: None   Code Status: No CPR    Ht: 172.7 cm (68\")   Wt: 67.8 kg (149 lb 8 oz)    Admission Cmt: None   Principal Problem: Colitis [K52.9]                 Active Insurance as of 2/21/2021     Primary Coverage     Payor Plan Insurance Group Employer/Plan Group    MEDICARE MEDICARE A & B      Payor Plan Address Payor Plan Phone Number Payor Plan Fax Number Effective Dates    [address] [phone]  11/1/2001 - None Entered    [facility] [id number]       Subscriber Name Subscriber Birth Date Member ID       SHUBHAM WHITE 1935 [id number]           Secondary Coverage     Payor Plan Insurance Group Employer/Plan Group    AARP [facility] SUP AARP HEALTH CARE OPTIONS      Payor Plan Address Payor Plan Phone Number Payor Plan Fax Number Effective Dates    [facility] [phone]  1/1/2016 - None Entered    [address]       [facility] [address]       Subscriber Name Subscriber Birth Date Member ID       SHUBHAM WHITE 1935 [id number]                 Emergency Contacts      (Rel.) Home Phone Work Phone Mobile Phone    Sandra White (Spouse) -- -- [phone]            Emergency Contact Information     Name Relation Home Work Mobile    Sandra White Spouse   [phone]          Insurance Information                MEDICARE/MEDICARE A & B Phone: [phone]    Subscriber: Shubham White Subscriber#: " [id number]    Group#:  Precert#:         [facility] SUP/[facility] HEALTH CARE OPTIONS Phone: [phone]    Subscriber: Amador White Subscriber#: [id number]    Group#:  Precert#:              History & Physical      Anita Gautam MD at [phone]              [facility] Medicine Services  HISTORY AND PHYSICAL    Patient Name: Amador White  : 1935  MRN: [id number]  Primary Care Physician: Mandy Shahid MD  Date of admission: 2021    Subjective   Subjective     Chief Complaint:  Bright red blood per rectum     HPI:  Amador White is a 85 y.o. male with a medical hx significant for hypertension, hyperlipidemia, hypothyroidism, Parkinson's disease, asthma, bipolar 1 disorer with depression. The patient was brought to [facility] from [facility] for bright red blood per rectum and abdominal pain.  History is primarily obtained from the patient's wife and personal records sent with the patient. The patient was recently hospitalized several times at [facility] for a persistent fever thought to be from a UTI and then subsequently pneumonia. He was discharged to [facility] at the end of January/beginning of February with a PICC line for intravenous antibiotics. The patient's wife reports he has been doing well since that time. Records sent from [facility] indicate the patient has been receiving 2g of Rocephin daily since 21. Labs on 21 revealed a hemoglobin on 10.5. The patient's wife states he has had issues with diarrhea/constipation for several years. No history of GI bleed, colon cancer, c.diff or colitis. Patient's sister with hx of colon cancer. The patient c/o lower abdominal pain with diarrhea today but unable to provide any other history due to dementia. Of note, the patient has been showing signs of sundowning. At his baseline the patient is orientated to time and people.     Upon arrival, the patient was hypotensive (82/48). Labs revealed hemoglobin 8.6,  platelets 109, lactate 2.3, potassium 2.9, calcium 8.5, BUN/Cr 37.1. Urinalysis positive for trace leukocytes, nitrites without bacteria.  COVID-19 not detected. CT abdomen pelvis demonstrates wall thickening and edema of the sigmoid colon and rectum compatible with colitis and proctitis with severe fecal loading of the colon, pneumobilia, and aneurysmal dilatation of the abdominal aorta measuring 3.7 cm. He was started on ceftriaxone and flagyl in the ED. The patient will be admitted to the hospitalist service for further medical management.     Current COVID Risks are:  [] Fever []  Cough [] Shortness of breath [] Fatigue [] Change in taste or smell    [] Exposure to COVID positive patient  [] High risk facility   [x]  NONE    Review of Systems   Constitutional: Negative for chills, diaphoresis, fatigue and fever.   HENT: Negative for congestion, sore throat and trouble swallowing.    Eyes: Negative for pain and visual disturbance.   Respiratory: Negative for cough, shortness of breath and wheezing.    Cardiovascular: Negative for chest pain, palpitations and leg swelling.   Gastrointestinal: Positive for abdominal pain, blood in stool and diarrhea. Negative for constipation, nausea and vomiting.   Genitourinary: Negative for difficulty urinating and dysuria.   Musculoskeletal: Negative for back pain and gait problem.   Skin: Negative for rash and wound.   Neurological: Negative for dizziness, syncope, speech difficulty, weakness and headaches.   Hematological: Negative for adenopathy. Does not bruise/bleed easily.   Psychiatric/Behavioral: Negative for agitation and confusion.      All other systems reviewed and are negative.     Personal History     Past Medical History:   Diagnosis Date   • Arthritis    • Bipolar 1 disorder (CMS/[facility])    • CHF (congestive heart failure) (CMS/[facility])    • Dementia (CMS/[facility])    • Depression    • H/O aortic aneurysm repair 1993    S/P thoracic. Residual paralyzed left hemidiaphragm w/  mild restrictive defect   • H/O chest x-ray 04/07/2015    Heart size normal. There is evidence of thoracic surgery in left chest with clips and sututes bhaskar the medial aspect of the enlarged thoracic aorta. Left lung volum is small from previous surgery and there is pleural reaction, best seen on lateral film. There are chronic pleural changes in the right chest which are probably en face pleural plaque.Film looks exactly like films from 2009,2010, 2012   • H/O chest x-ray 08/08/2012    Left diaphragm to be elevated chronically. There is evidence of previous surgery on left with line of clips along the left hilum. There is large thoracic aortic aneurysm overlying the chest. There is a graft into the left hilum and a clip in left paratracheal region. There are mirco calcifications in right base. Apices appear hyperlucent, right more so that left. I cannot rule out bullous disease   • H/O chest x-ray 06/16/2010    Chronic changes consistent with thoracic aortic aneurysm repair. The microcalcifications are unchanges in the right base   • History of PFTs 04/07/2015    Mixed mild obstruction, mild restrictive lung disease with normal diffusion capacity   • History of PFTs 08/08/2011    Pt gave good effort   • History of PFTs 12/09/2010    PFT acceptable and reproducible. Pt given 3 puffs of xopenex. Pt gave good effort   • Parkinson's disease (CMS/HCC)    • Tremor of hands and face     Follwed by Dr. Dumont       Past Surgical History:   Procedure Laterality Date   • ABDOMINAL AORTIC ANEURYSM REPAIR     • CATARACT EXTRACTION, BILATERAL  2017   • LARYNX SURGERY     • OTHER SURGICAL HISTORY  1993    Thoracic aortic aneuysm repair   • TONSILLECTOMY AND ADENOIDECTOMY         Family History: family history includes Aneurysm in his sister; Asthma in his daughter; Cancer in his sister; Heart disease in his father; Heart failure in his mother. Otherwise pertinent FHx was reviewed and unremarkable.     Social History:  reports that  he quit smoking about 55 years ago. His smoking use included cigarettes. He has a 5.00 pack-year smoking history. He has never used smokeless tobacco. He reports that he does not drink alcohol or use drugs.  Social History     Social History Narrative   • Not on file       Medications:  SUMAtriptan, albuterol sulfate HFA, aspirin, carbidopa-levodopa, cyclobenzaprine, divalproex, escitalopram, fluticasone-salmeterol, gabapentin, levothyroxine, pramipexole, propranolol LA, simvastatin, and topiramate    Allergies   Allergen Reactions   • Zolpidem Hallucinations       Objective   Objective     Vital Signs:   Temp:  [97 °F (36.1 °C)-97.7 °F (36.5 °C)] 97.7 °F (36.5 °C)  Heart Rate:  [70-85] 75  Resp:  [16-18] 16  BP: ()/(27-72) 116/41    Physical Exam   Constitutional: Awake, alert, lying in bed, pale    Eyes: PERRLA, sclerae anicteric, bilateral conjunctival palor  HENT: NCAT, mucous membranes dry  Neck: Supple, no thyromegaly, no lymphadenopathy, trachea midline  Respiratory: Clear to auscultation bilaterally, nonlabored respirations   Cardiovascular: RRR, no murmurs appreciated, palpable pedal pulses bilaterally  Gastrointestinal: Positive bowel sounds, soft, RLQ and LLQ tender to palpation, no distention or guarding   Musculoskeletal: No bilateral ankle edema, no clubbing or cyanosis to extremities  Psychiatric: Appropriate affect, cooperative  Neurologic: Oriented to time only, strength symmetric in all extremities, Cranial Nerves grossly intact to confrontation, speech clear  Skin: No rashes or wounds appreciated     Results Reviewed:  I have personally reviewed most recent indicated data and agree with findings including:  [x]  Laboratory  [x]  Radiology  []  EKG/Telemetry  []  Pathology  []  Cardiac/Vascular Studies  []  Old records  []  Other:    Most pertinent findings include:  CT abdomen pelvis demonstrates wall thickening and edema of the sigmoid colon and rectum compatible with colitis and proctitis  with severe fecal loading of the colon, pneumobilia, and aneurysmal dilatation of the abdominal aorta measuring 3.7 cm.     LAB RESULTS:      Lab 02/21/21 2049 02/21/21  1655   WBC  --  5.05   HEMOGLOBIN  --  8.6*   HEMATOCRIT  --  29.0*   PLATELETS  --  109*   NEUTROS ABS  --  3.69   IMMATURE GRANS (ABS)  --  0.02   LYMPHS ABS  --  0.79   MONOS ABS  --  0.44   EOS ABS  --  0.09   MCV  --  95.4   PROCALCITONIN  --  0.07   LACTATE 2.0 2.3*   PROTIME  --  16.2*         Lab 02/21/21  1655   SODIUM 149*   POTASSIUM 2.9*   CHLORIDE 110*   CO2 30.0*   ANION GAP 9.0   BUN 26*   CREATININE 0.70*   GLUCOSE 114*   CALCIUM 8.5*         Lab 02/21/21 1655   TOTAL PROTEIN 5.6*   ALBUMIN 3.20*   GLOBULIN 2.4   ALT (SGPT) <5   AST (SGOT) 12   BILIRUBIN 0.3   ALK PHOS 66         Lab 02/21/21 1655   PROTIME 16.2*   INR 1.33*             Lab 02/21/21 1952 02/21/21  1750   ABO TYPING O O   RH TYPING Positive Positive   ANTIBODY SCREEN  --  Negative         Brief Urine Lab Results  (Last result in the past 365 days)      Color   Clarity   Blood   Leuk Est   Nitrite   Protein   CREAT   Urine HCG        02/21/21 1741 Dark Yellow Clear Negative Trace Positive 100 mg/dL (2+)             Microbiology Results (last 10 days)     Procedure Component Value - Date/Time    Respiratory Panel PCR w/COVID-19(SARS-CoV-2) ZAHEER/ALFONSO/EMILIANO/PAD/COR/MAD/HOLLIE In-House, NP Swab in UTM/VTM, 3-4 HR TAT - Swab, Nasopharynx [[id number]]  (Normal) Collected: 02/21/21 1741    Lab Status: Final result Specimen: Swab from Nasopharynx Updated: 02/21/21 1850     ADENOVIRUS, PCR Not Detected     Coronavirus 229E Not Detected     Coronavirus HKU1 Not Detected     Coronavirus NL63 Not Detected     Coronavirus OC43 Not Detected     COVID19 Not Detected     Human Metapneumovirus Not Detected     Human Rhinovirus/Enterovirus Not Detected     Influenza A PCR Not Detected     Influenza B PCR Not Detected     Parainfluenza Virus 1 Not Detected     Parainfluenza Virus 2 Not  Detected     Parainfluenza Virus 3 Not Detected     Parainfluenza Virus 4 Not Detected     RSV, PCR Not Detected     Bordetella pertussis pcr Not Detected     Bordetella parapertussis PCR Not Detected     Chlamydophila pneumoniae PCR Not Detected     Mycoplasma pneumo by PCR Not Detected    Narrative:      Fact sheet for providers: https://docs.OP3Nvoice/wp-content/uploads/JRT2846-2027-YN9.1-EUA-Provider-Fact-Sheet-3.pdf    Fact sheet for patients: https://docs.OP3Nvoice/wp-content/uploads/KTV1691-3178-GE8.1-EUA-Patient-Fact-Sheet-1.pdf    Test performed by PCR.          Ct Abdomen Pelvis Without Contrast    Result Date: 2/21/2021  EXAMINATION: CT ABDOMEN PELVIS WO CONTRAST-  INDICATION: upper abdominal pain, bright red rectal bleeding  TECHNIQUE: Axial noncontrast CT of the cervical spine with multiplanar reconstruction  The radiation dose reduction device was turned on for each scan per the ALARA (As Low as Reasonably Achievable) protocol.  COMPARISON: NONE  FINDINGS: The lung bases are grossly clear. Body wall soft tissues demonstrate no acute findings. There is no evidence of acute fracture or aggressive osseous lesion. The liver demonstrates no evidence of suspicious focal lesion. There is fairly pronounced pneumobilia, nonspecific kidneys demonstrates several nonobstructing stones, otherwise unremarkable. There is no overt pneumoperitoneum. The spleen, pancreas and bilateral adrenal glands demonstrate homogeneous attenuation without evidence of suspicious focal lesion. There is aneurysmal dilatation of the abdominal aorta with suggestion of a false lumen and possible dissection, potentially chronic, without definite adjacent acute inflammatory signs. There is large fecal burden within the large bowel. There is fairly pronounced circumferential wall edema distal sigmoid colon and rectum compatible with probable sigmoid colitis and probable proctitis. There is mild diffuse mesenteric edema. Pelvic  viscera are otherwise unremarkable.      Impression: Fairly marked wall thickening and edema of the sigmoid colon and rectum compatible with colitis and proctitis. There is severe fecal loading of the colon.  There is nonspecific pronounced pneumobilia. Correlate with any associated recent instrumentation or other etiologies.  Limited noncontrast evaluation demonstrates aneurysmal dilatation of the abdominal aorta measuring up to 3.7 cm, with suggestion of a false lumen and possible dissection, favored chronic given absence of adjacent acute inflammatory signs.  This report was finalized on 2/21/2021 6:49 PM by Sameer Owens.             Assessment/Plan   Assessment & Plan       Colitis    Chronic asthma    Hypertension    CHF (congestive heart failure) (CMS/[facility])    Bipolar 1 disorder (CMS/[facility])    Dementia (CMS/[facility])    Parkinson's disease (CMS/[facility])    H/O aortic aneurysm repair    Depression    Amador White is a 85 y.o. male with a medical hx significant for hypertension, hyperlipidemia, hypothyroidism, Parkinson's disease, asthma, bipolar 1 disorer with depression who came from [facility] for [facility].     1. Colitis, Proctitis   2. Lactic acidosis  - reflex pending    - blood cultures x 2  - c.diff toxin pending   - Rocephin and Flagyl   - continue IVF overnight   - NPO   - contact spore isolation     3. GI bleed  4. Symptomatic Anemia  - consult GI   - obtain iron studies  - transfuse 2 units of PRBCs   - administer IV Protonix BID  - keep npo  - serial H&H q6h   - strict I&Os   - fall precautions     5. Hypokalemia, Hypocalcemia    - check magnesium and ionized calcium     - replace per protocol     6. Hypertension   - hypotensive in ED  - resolved after 1L of fluid in ED  - hold propranolol     7. Hyperlipidemia  - continue simvastatin     8. Hypothyroidism  - continue levothyroxine   - check TSH     9. Aortic aneurysm   - request records     10. Parkinson's disease, Dementia   - hx of sundowning   - continue  carbidopa-levodopa   - QTc 502  - fall precautions     11. Bipolar 1 disorder, Depression   - continue divalproex, escitalopram, topiramate     DVT prophylaxis:  SCDs    CODE STATUS:    Code Status and Medical Interventions:   Ordered at: 02/21/21 2320     Level Of Support Discussed With:    Health Care Surrogate     Code Status:    CPR     Medical Interventions (Level of Support Prior to Arrest):    Full     This note has been completed as part of a split-shared workflow.     Signature: Electronically signed by Ermelinda Russell PA-C, 02/21/21, 11:20 PM EST.        Brief Attending Admission Attestation     I have seen and examined the patient, performing an independent face-to-face diagnostic evaluation with plan of care reviewed and developed with the advanced practice clinician (APC).    Old records reviewed and summarized in PM hx.    Brief Summary Statement:  Patient has been hospitalized to [facility]-with persistent fever, treated for UTI and subsequently pneumonia-with IV antibiotics, discharged to [facility] on PICC line for more antibiotics (Rocephin).  Sent to ED from 1012-secondary to complaint of lower abdominal pain, bright red blood per rectum-patient poor historian secondary to dementia.  Was initially hypotensive in ED, afebrile, confused.    In ER patient got Rocephin, Flagyl, normal saline 2 L.  Remainder of detailed HPI is as noted above and has been reviewed and/or edited by me for completeness.    PM HX:  Parkinson's-Sinemet 25/100-2 tabs every 8  Bipolar-Depakote 500 mg daily, Mirapex 0.25 mg p.o. daily  Mood disorder-Lexapro 20 mg p.o. daily  Asthma/pulmonary fibrosis/bronchiectasis-follows up with pulmonary.  -Advair  Hypothyroid-Synthroid 75 mcg  CAD-aspirin 81 mg p.o. daily  Hyperlipidemia-propranolol 120 mg p.o. daily  Topamax 100 mg p.o. daily  BPH-was scheduled for surgery  Anemia-with baseline hemoglobin around 10    Vitals:    02/21/21 2330   BP: (!) 112/38-82/48   Pulse:  75   Resp:  16    Temp:  Afebrile   SpO2:  100% on room air       Attending Physical Exam:  RS- CTA-BL, ,  No wheezing , no crackles, good effort.  CVS- s1s2 regular, no murmur.  ABD-firm, especially on the right periumbilical area, somewhat tender on deep palpation  EXT-2+ pitting edema  NEURO-patient is sleeping, not a good historian, does not follow command    LABS:  Glucose-114  Potassium-2.9, sodium 149, BUN/creatinine 26/0.7, bicarb of 30  LFTs WNL  Albumin 3.2  UA-ketones 15, glucose negative, RBC 7-12, bacteria none seen, WBC 6-12,    Brief Assessment/Plan  Abdominal pain/lower GI bleed/overt constipation -lactic acid of 2.3,   CT abdomen pelvis-pneumobilia,, potential chronic abdominal aortic dilation-3.7 cm/dissection, large fecal burden within large bowel circumferential wall edema distal sigmoid colon and rectum-consistent with sigmoid colitis/proctitis, mild diffuse mesenteric edema.  Try enema -1.  Also pt altered to tolerate the bowel prep, and BP borderline, needs continuous resuscitation.  If rectal bleeding slows down, -Ischemic versus infectious colitis, versus stercoral proctitis-stool for C. difficile sent due to recent use of antibiotic, Flagyl added to his Rocephin  GI consult in a.m.    Anemia-hemoglobin of 9, MCV 95, RDW of 17, platelet 109    See above for further detailed assessment and plan developed with APC which I have reviewed and/or edited for completeness.      Admission Status: I believe that this patient meets inpatient criteria secondary to lower GI bleed.                Electronically signed by Anita Gautam MD at 02/22/21 6679

## 2021-03-05 NOTE — PROGRESS NOTES
Hospice Progress Note    Patient Name: Amador White   : 1935  Gender: male    Code Status: comfort measures    Date of Admission: 3/4/2021    Subjective:    85yoM admitted in Hospice 3/4 for CVA    Overnight notes reviewed: Resting well. UOP adq. Valencia. Edema significant. Bathed on nights. Peaceful. Continue comfort care.    Today, pt remains comfortable after multiple prns. Keeps bending his Right hand/forearm up. Family at bedside.     - PRNs:  Haldol 1mg x1  toradol 15mg  Ativan 0.5mg x2  Morphine 2mg x5    - Held: none    - Intake/Output  *PO: NPO  *Urine: 200ml  *LBM: 3/2      ROS:  Review of Systems   Unable to perform ROS: Acuity of condition       Reviewed current scheduled and prn medications for route, type, dose and frequency.     •  acetaminophen  •  bisacodyl  •  furosemide  •  glycopyrrolate  •  haloperidol lactate  •  ketorolac  •  LORazepam  •  Morphine  •  palliative care oral rinse  •  polyvinyl alcohol  •  Scopolamine    Objective:   There were no vitals taken for this visit.       PPS: Palliative Performance Scale score as of 3/5/2021, 14:39 EST is 10% based on the following measures:   Ambulation: Totally bed bound  Activity and Evidence of Disease: Unable to do any work, extensive evidence of disease  Self-Care: Total care  Intake:  Mouth care only  LOC: Drowsy or coma      Physical Exam:  Physical Exam  Constitutional:       General: He is not in acute distress.     Comments: Pale, frail, elderly. Actively dying.   HENT:      Head: Normocephalic and atraumatic.      Mouth/Throat:      Mouth: Mucous membranes are dry.   Eyes:      Comments: closed   Neck:      Musculoskeletal: Neck supple.   Cardiovascular:      Rate and Rhythm: Normal rate and regular rhythm.      Heart sounds: Murmur (SOLITARIO III/VI) present.   Pulmonary:      Comments: CTA. No audible congestion. Respirations at even, non labored.   Abdominal:      Palpations: Abdomen is soft.   Musculoskeletal:      Comments: Mild  BUE edema   Skin:     General: Skin is dry.   Neurological:      Comments: Unresponsive; does not follow commands   Psychiatric:      Comments: No facial grimacing; no moaning           Acute right MCA stroke (CMS/HCC)      Assessment/Plan:     85yoM admitted inpt Hospice 3/4 for CVA  Dyspnea  Anxiety  Congestion  Constipation  Pain, nos  End of Life Care    Schedule ativan IV 0.5mg q8h    Schedule morphine 2mg IV q6h    Continue scheduled and prn palliative oral care    Continue scheduled and prn eye gtts    Prns for comfort    Discussion with family regarding end of life s/s and symptom mgmt. Support provided. Hospice contact information shared.     Discharge Disposition: EOLC    Total Visit Time: 45min  Face to Face Time: 35min    Justification for care:  Patient meets criteria for acute in-patient care with required nursing assessment and interventions for symptoms with IV medications.      Maddy Medrano DNP, MHA, APRN  [facility] Care Navigators  Hospice and Palliative Care Nurse Practitioner  03/05/21  10:56 EST

## 2021-03-05 NOTE — PROGRESS NOTES
Continued Stay Note  [facility]     Patient Name: Amador White    Today's Date: 3/5/2021    Admit Date: 3/4/2021    Assessment Note:    3/5 20 % Amador White is a 85-year-old  male admitted to Hospice with R MCA. Patient restless during visit, Prns just given. Patient has received multiple prns over the past 24 hours for restlessness and pain. Last BM ¾. Skin warm and dry. Ua output dark yellow. Wife and daughter at bedside. Continue to monitor for nonverbal signs of pain.     Discharge plan:    Currently patient remains GIP for complications of EOL care requiring skilled nursing and medical management of symptoms. Discharge plan dependent on a decreased level of care and survival of this admission.      POC:  Family support and education R/T EOL care      Hospice Criteria:  Hospice care provides support and care for persons and their families with a life limiting disease. Hospice is a Medicare benefit and requires   1) Patient prognosis is 6 months or less to live,   2) Patient no longer going through curative therapies.   3) Agreement of 2 physicians that patient's condition is life limiting and will most likely result in death in <6 months.  Hospice is a Level of care not a location and can be provided in various settings based on patient's needs.    Medicare guidelines state that hospice patients in the hospital require skilled nursing, medical interventions and a level of care that cannot be met in any other setting. This stay is most commonly less then two week.   A patient may be Hospice appropriate but not inpatient appropriate based on this criteria.  Once a Hospice referral is reviewed and  appears to be stable enough to be transferred to a lower level of care with hospice then that is the preference.     Sydney YOU. RN. PN.  [facility] Navigators  Hospice Care  [phone]

## 2021-03-05 NOTE — PROGRESS NOTES
Adult Nutrition  Assessment/PES    Patient Name:  Amador White  YOB: 1935  MRN: [id number]  Admit Date:  3/4/2021    Assessment Date:  3/5/2021        Reason for Assessment     Row Name 03/05/21 1028          Reason for Assessment    Reason For Assessment  -- Noted inpt hospice adm on 3/4. No nutrition intervention needed at this time. RD will sign off; RD avail  prn.           Electronically signed by:  Justine Gonzales MS,RD,LD  03/05/21 10:29 EST

## 2021-03-05 NOTE — PLAN OF CARE
Problem: Skin Injury Risk Increased  Goal: Skin Health and Integrity  Intervention: Optimize Skin Protection  Recent Flowsheet Documentation  Taken 3/5/2021 0800 by Nora Combs RN  Pressure Reduction Techniques:   frequent weight shift encouraged   weight shift assistance provided  Pressure Reduction Devices: pressure-redistributing mattress utilized  Skin Protection:   adhesive use limited   incontinence pads utilized     Problem: Fall Injury Risk  Goal: Absence of Fall and Fall-Related Injury  Intervention: Promote Injury-Free Environment  Recent Flowsheet Documentation  Taken 3/5/2021 0800 by Nora Combs RN  Safety Promotion/Fall Prevention:   safety round/check completed   toileting scheduled     Problem: Adult Inpatient Plan of Care  Goal: Absence of Hospital-Acquired Illness or Injury  Intervention: Identify and Manage Fall Risk  Recent Flowsheet Documentation  Taken 3/5/2021 0800 by Nora Combs RN  Safety Promotion/Fall Prevention:   safety round/check completed   toileting scheduled   Goal Outcome Evaluation:   Pt restless this am. Prn's given. Hospice nurse notified.

## 2021-03-05 NOTE — H&P
Mandy Shahid MD - PCP      HPI:   84yo M w/ HTN, HLD, AAA s/p repair (1993), Parkinson's, dementia, CHF, hypothyroidism, depression and bipolar disorder. Multiple recent hospitalizations for recurrent enterococcus faecalis bacteremia and UTI; following with ID outpatient.    Hospitalized 2/21/2021 w/ rectal bleeding; found to have colitis, proctitis, and rectal stercoral ulcers during colonoscopy on 2/25. Sigmoidoscopy on 3/1 found Dieulafoy ulcer, clipping performed. Patient has been managed for dysphagia with discussion about ongoing management with underlying dementia. Early am of 3/3, patient had acute neurologic decline (Left-sided deficits, eye deviation, aphasia and dysarthria) with code stroke. MRI brain showed large region of acute ischemia to anterior portion of the R MCA territory. CTA of the head and neck show complete occlusion of superior division of Right MCA just beyond M1 bifurcation.    Given baseline dementia, comorbid medical problems, and new large stroke with worsening of baseline severe dysphagia, prognosis dismal with patient not expected to make any meaningful functional recovery.  After multiple discussions with hospital medicine and Palliative Care, wife admitted she just wants patient comfortable and in agreement with transition to full comfort focused care.    Patient seen w/o family present.    No acute needs or concerns from nursing staff.    Meds reviewed.  Took neurontin, flexeril, depakote, mirapex and lexapro at home.  Has been given PRN toradol and morphine today.    Past Medical History:   Diagnosis Date   • Arthritis    • Asthma    • Bipolar 1 disorder (CMS/[facility])    • CHF (congestive heart failure) (CMS/[facility])    • COPD (chronic obstructive pulmonary disease) (CMS/[facility])    • Dementia (CMS/[facility])    • Depression    • Disease of thyroid gland    • H/O chest x-ray 08/08/2012    Left diaphragm to be elevated chronically. There is evidence of previous surgery on left with line of  clips along the left hilum. There is large thoracic aortic aneurysm overlying the chest. There is a graft into the left hilum and a clip in left paratracheal region. There are mirco calcifications in right base. Apices appear hyperlucent, right more so that left. I cannot rule out bullous disease   • Hyperlipidemia    • Hypertension    • Insomnia    • Parkinson's disease (CMS/HCC)    • RLS (restless legs syndrome)    • Tremor of hands and face     Follwed by Dr. Dumont     Past Surgical History:   Procedure Laterality Date   • ABDOMINAL AORTIC ANEURYSM REPAIR      1993   • CATARACT EXTRACTION, BILATERAL  2017   • CHOLECYSTECTOMY     • COLONOSCOPY N/A 2/24/2021    Procedure: COLONOSCOPY;  Surgeon: Brunner, Mark I, MD;  Location:  [facility];  Service: Gastroenterology;  Laterality: N/A;   • EYE SURGERY     • LARYNX SURGERY     • SIGMOIDOSCOPY N/A 3/1/2021    Procedure: SIGMOIDOSCOPY FLEXIBLE;  Surgeon: Brunner, Mark I, MD;  Location:  [facility];  Service: Gastroenterology;  Laterality: N/A;   • TONSILLECTOMY AND ADENOIDECTOMY       Current Code Status     Date Active Code Status Order ID Comments User Context       3/4/2021 1049 No CPR [id number]  Maddy Medrano, APRN Inpatient     Advance Care Planning Activity      Questions for Current Code Status     Question Answer Comment    Code Status No CPR     Medical Interventions (Level of Support Prior to Arrest) Comfort Measures         Scheduled Meds:palliative care oral rinse, , Mouth/Throat, Q6H  polyvinyl alcohol, 2 drop, Both Eyes, BID      PRN Meds:.•  [START ON 3/5/2021] acetaminophen  •  bisacodyl  •  furosemide  •  glycopyrrolate  •  haloperidol lactate  •  ketorolac  •  LORazepam  •  Morphine  •  palliative care oral rinse  •  polyvinyl alcohol  •  Scopolamine      Allergies   Allergen Reactions   • Zolpidem Hallucinations     Family History   Problem Relation Age of Onset   • Heart failure Mother         Congestive   • Heart disease Father          CAD   • Asthma Daughter         history of allergies and asthma   • Aneurysm Sister    • Cancer Sister         colon     Social History     Socioeconomic History   • Marital status:      Spouse name: Not on file   • Number of children: Not on file   • Years of education: Not on file   • Highest education level: Not on file   Tobacco Use   • Smoking status: Former Smoker     Packs/day: 0.50     Years: 10.00     Pack years: 5.00     Types: Cigarettes     Quit date:      Years since quittin.2   • Smokeless tobacco: Never Used   Substance and Sexual Activity   • Alcohol use: No   • Drug use: No   • Sexual activity: Defer   , wife Sandra.  Sandra is undergoing treatment for breast cancer with radiation scheduled for Friday.   3 daughters in [place].    Review of Systems -  Unable to obtain from ill, altered patient; see HPI and PMH      There were no vitals taken for this visit.  No intake or output data in the 24 hours ending 21 0118  Physical Exam:  General Appearance: ill, elderly male; in bed, NAD  Head: NC/AT  Eyes: closed, no lid edema  ENT:  Mouth open and dry, slight rattling of secretions  Lungs: short intakes of breath, clear, respirations unlabored  Heart: Regular, + murmur  Abdomen: soft, non-tender  : burns anchored  Extremities: no cyanosis, no edema; right hand grasping bed rail  Pulses: Distal pulses palpable  Skin: Warm, dry, pale  Neurological: not awake or alert, no response to my exam or voice, no myoclonus    Results from last 7 days   Lab Units 21  0418   WBC 10*3/mm3 4.27   HEMOGLOBIN g/dL 7.7*   HEMATOCRIT % 25.7*   PLATELETS 10*3/mm3 150     Results from last 7 days   Lab Units 21  0418   SODIUM mmol/L 141   POTASSIUM mmol/L 2.9*   CHLORIDE mmol/L 107   CO2 mmol/L 29.0   BUN mg/dL 11   CREATININE mg/dL 0.47*   CALCIUM mg/dL 7.6*   BILIRUBIN mg/dL 0.4   ALK PHOS U/L 83   ALT (SGPT) U/L <5   AST (SGOT) U/L 8   GLUCOSE mg/dL 81     Results from last 7 days   Lab  Units 03/03/21  0418   SODIUM mmol/L 141   POTASSIUM mmol/L 2.9*   CHLORIDE mmol/L 107   CO2 mmol/L 29.0   BUN mg/dL 11   CREATININE mg/dL 0.47*   GLUCOSE mg/dL 81   CALCIUM mg/dL 7.6*     Imaging Results (Last 72 Hours)      Procedure Component Value Units Date/Time     MRI Brain Without Contrast [[id number]] Collected: 03/03/21 0533       Updated: 03/03/21 0536     Narrative:       MRI BRAIN WITHOUT CONTRAST, 3/3/2021     HISTORY:    85-year-old male hospital inpatient with code stroke this morning for altered mentation. Right side facial droop, aphasia, left-sided weakness.     TECHNIQUE:  Multiplanar multisequence MR imaging of the brain without IV contrast.     COMPARISON:  *  CT head, CT perfusion study and CTA head and neck tonight.     FINDINGS:  Large region of restricted diffusion corresponding to the anterior portion of the right MCA territory involving posterior frontal lobe, anterior temporal lobe and anterior insular cortex centered on the sylvian fissure. This corresponds to the superior  division of the MCA with acute occlusion of this vessel just beyond the M1 bifurcation seen on CTA tonight and matches the zone of acute ischemia on CT perfusion.     No additional foci of restricted diffusion elsewhere within the brain. No evidence of hemorrhagic transformation.     No intracranial mass, mass effect, significant cerebral edema or hydrocephalus. Mild chronic small vessel type white matter changes.        Impression:       1.  Large region of acute ischemia corresponding to the anterior portion of the right MCA territory as detailed above.  2.  No additional intracranial lesions.     Signer Name: Edgar Caballero MD   Signed: 3/3/2021 5:33 AM   Workstation Name: [email or web address]    [facility]     XR Chest 1 View [[id number]] Collected: 03/03/21 0444        Updated: 03/03/21 0447     Narrative:       CHEST X-RAY, 3/3/2021       HISTORY:    85-year-old male hospital inpatient  undergoing acute stroke evaluation. New onset neurologic deficit tonight.       TECHNIQUE:  AP portable chest x-ray.       FINDINGS:  Mildly prominent and indistinct diffuse interstitial markings may represent mild pulmonary edema. There is more dense airspace opacification of the left lower lobe which may represent lung consolidation and/or left pleural effusion.     Chronic pleural calcification at the right lung base and along the descending thoracic aorta. Surgical clip at the level of the aortic arch. Chronic pleural thickening at the left lung apex.        Impression:       Suspected mild vascular congestion. Left lung base consolidation and/or pleural effusion.     Signer Name: Edgar Caballero MD   Signed: 3/3/2021 4:44 AM   Workstation Name: [email or web address]    [facility] [place]       Impression:   85 y.o. male with baseline dementia and severe dysphagia; recurrent hospitalization.  Now with acute R MCA ischemic stroke, colitis, and GIB s/p clipping Dieulafoy ulcer.     Symptoms:  Encephalopathy  Debility  Dyspnea  Restlessness  Dysphagia    Plan:   -Hospice GIP for comfort focused end of life care.  -Generous PRN medications ordered to manage distressing symptoms of pain, dyspnea, nausea, anxiety, restlessness, agitation, fever, constipation, and secretions.    -Support and education provided to family from the multidisciplinary Hospice team.  -Prognosis hours to days.        Ciara Sal MD  03/04/21

## 2021-03-05 NOTE — PLAN OF CARE
Problem: Adult Inpatient Plan of Care  Goal: Plan of Care Review  3/5/2021 0258 by Reno Fitzgerald, RN  Outcome: Ongoing, Progressing  Flowsheets  Taken 3/5/2021 0258 by Reno Fitzgerald, RN  Plan of Care Reviewed With: patient  Outcome Summary: Resting well. UOP adq. [place]. Edema significant. Bathed on nights. Peaceful. Continue comfort care.  Taken 3/4/2021 1840 by Joanna Celestin, RN  Progress: no change   Goal Outcome Evaluation:  Plan of Care Reviewed With: patient     Outcome Summary: Resting well. UOP adq. [place]. Edema significant. Bathed on nights. Peaceful. Continue comfort care.

## 2021-03-06 NOTE — H&P
Mandy Shahid MD - PCP      HPI:   86yo M w/ HTN, HLD, AAA s/p repair (1993), Parkinson's, dementia, CHF, hypothyroidism, depression and bipolar disorder. Multiple recent hospitalizations for recurrent enterococcus faecalis bacteremia and UTI; following with ID outpatient.    Hospitalized 2/21/2021 w/ rectal bleeding; found to have colitis, proctitis, and rectal stercoral ulcers during colonoscopy on 2/25. Sigmoidoscopy on 3/1 found Dieulafoy ulcer, clipping performed. Patient has been managed for dysphagia with discussion about ongoing management with underlying dementia. Early am of 3/3, patient had acute neurologic decline (Left-sided deficits, eye deviation, aphasia and dysarthria) with code stroke. MRI brain showed large region of acute ischemia to anterior portion of the R MCA territory. CTA of the head and neck show complete occlusion of superior division of Right MCA just beyond M1 bifurcation.     Given baseline dementia, comorbid medical problems, and new large stroke with worsening of baseline severe dysphagia, prognosis dismal with patient not expected to make any meaningful functional recovery.  After multiple discussions with hospital medicine and Palliative Care, wife admitted she just wants patient comfortable and in agreement with transition to full comfort focused care.     Patient seen w/o family present.    No acute needs or concerns from nursing staff.     Meds reviewed.  Took neurontin, flexeril, depakote, mirapex and lexapro at home.  Has been given PRN toradol and morphine today.     Past Medical History:   Diagnosis Date   • Arthritis    • Asthma    • Bipolar 1 disorder (CMS/[facility])    • CHF (congestive heart failure) (CMS/[facility])    • COPD (chronic obstructive pulmonary disease) (CMS/[facility])    • Dementia (CMS/[facility])    • Depression    • Disease of thyroid gland    • H/O chest x-ray 08/08/2012    Left diaphragm to be elevated chronically. There is evidence of previous surgery on left with line of  clips along the left hilum. There is large thoracic aortic aneurysm overlying the chest. There is a graft into the left hilum and a clip in left paratracheal region. There are mirco calcifications in right base. Apices appear hyperlucent, right more so that left. I cannot rule out bullous disease   • Hyperlipidemia    • Hypertension    • Insomnia    • Parkinson's disease (CMS/HCC)    • RLS (restless legs syndrome)    • Tremor of hands and face     Follwed by Dr. Dumont     Past Surgical History:   Procedure Laterality Date   • ABDOMINAL AORTIC ANEURYSM REPAIR      1993   • CATARACT EXTRACTION, BILATERAL  2017   • CHOLECYSTECTOMY     • COLONOSCOPY N/A 2/24/2021    Procedure: COLONOSCOPY;  Surgeon: Brunner, Mark I, MD;  Location:  [facility];  Service: Gastroenterology;  Laterality: N/A;   • EYE SURGERY     • LARYNX SURGERY     • SIGMOIDOSCOPY N/A 3/1/2021    Procedure: SIGMOIDOSCOPY FLEXIBLE;  Surgeon: Brunner, Mark I, MD;  Location:  [facility];  Service: Gastroenterology;  Laterality: N/A;   • TONSILLECTOMY AND ADENOIDECTOMY       Current Code Status     Date Active Code Status Order ID Comments User Context       3/4/2021 1049 No CPR [id number]  Maddy Medrano, APRN Inpatient     Advance Care Planning Activity      Questions for Current Code Status     Question Answer Comment    Code Status No CPR     Medical Interventions (Level of Support Prior to Arrest) Comfort Measures         Scheduled Meds:palliative care oral rinse, , Mouth/Throat, Q6H  polyvinyl alcohol, 2 drop, Both Eyes, BID      PRN Meds:.•  [START ON 3/5/2021] acetaminophen  •  bisacodyl  •  furosemide  •  glycopyrrolate  •  haloperidol lactate  •  ketorolac  •  LORazepam  •  Morphine  •  palliative care oral rinse  •  polyvinyl alcohol  •  Scopolamine      Allergies   Allergen Reactions   • Zolpidem Hallucinations     Family History   Problem Relation Age of Onset   • Heart failure Mother         Congestive   • Heart disease Father          CAD   • Asthma Daughter         history of allergies and asthma   • Aneurysm Sister    • Cancer Sister         colon     Social History     Socioeconomic History   • Marital status:      Spouse name: Not on file   • Number of children: Not on file   • Years of education: Not on file   • Highest education level: Not on file   Tobacco Use   • Smoking status: Former Smoker     Packs/day: 0.50     Years: 10.00     Pack years: 5.00     Types: Cigarettes     Quit date:      Years since quittin.2   • Smokeless tobacco: Never Used   Substance and Sexual Activity   • Alcohol use: No   • Drug use: No   • Sexual activity: Defer   , wife Sandra.  Sandra is undergoing treatment for breast cancer with radiation scheduled for Friday.   3 daughters in [place].     Review of Systems -  Unable to obtain from ill, altered patient; see HPI and PMH        There were no vitals taken for this visit.  No intake or output data in the 24 hours ending 21 5795  Physical Exam:  General Appearance: ill, elderly male; in bed, NAD  Head: NC/AT  Eyes: closed, no lid edema  ENT:  Mouth open and dry, slight rattling of secretions  Lungs: short intakes of breath, clear, respirations unlabored  Heart: Regular, + murmur  Abdomen: soft, non-tender  : burns anchored  Extremities: no cyanosis, no edema; right hand grasping bed rail  Pulses: Distal pulses palpable  Skin: Warm, dry, pale  Neurological: not awake or alert, no response to my exam or voice, no myoclonus    Results from last 7 days   Lab Units 21  0418   WBC 10*3/mm3 4.27   HEMOGLOBIN g/dL 7.7*   HEMATOCRIT % 25.7*   PLATELETS 10*3/mm3 150     Results from last 7 days   Lab Units 21  0418   SODIUM mmol/L 141   POTASSIUM mmol/L 2.9*   CHLORIDE mmol/L 107   CO2 mmol/L 29.0   BUN mg/dL 11   CREATININE mg/dL 0.47*   CALCIUM mg/dL 7.6*   BILIRUBIN mg/dL 0.4   ALK PHOS U/L 83   ALT (SGPT) U/L <5   AST (SGOT) U/L 8   GLUCOSE mg/dL 81     Results from last 7 days   Lab  Units 03/03/21  0418   SODIUM mmol/L 141   POTASSIUM mmol/L 2.9*   CHLORIDE mmol/L 107   CO2 mmol/L 29.0   BUN mg/dL 11   CREATININE mg/dL 0.47*   GLUCOSE mg/dL 81   CALCIUM mg/dL 7.6*     Imaging Results (Last 72 Hours)      Procedure Component Value Units Date/Time     MRI Brain Without Contrast [[id number]] Collected: 03/03/21 0533       Updated: 03/03/21 0536     Narrative:       MRI BRAIN WITHOUT CONTRAST, 3/3/2021     HISTORY:    85-year-old male hospital inpatient with code stroke this morning for altered mentation. Right side facial droop, aphasia, left-sided weakness.     TECHNIQUE:  Multiplanar multisequence MR imaging of the brain without IV contrast.     COMPARISON:  *  CT head, CT perfusion study and CTA head and neck tonight.     FINDINGS:  Large region of restricted diffusion corresponding to the anterior portion of the right MCA territory involving posterior frontal lobe, anterior temporal lobe and anterior insular cortex centered on the sylvian fissure. This corresponds to the superior  division of the MCA with acute occlusion of this vessel just beyond the M1 bifurcation seen on CTA tonight and matches the zone of acute ischemia on CT perfusion.     No additional foci of restricted diffusion elsewhere within the brain. No evidence of hemorrhagic transformation.     No intracranial mass, mass effect, significant cerebral edema or hydrocephalus. Mild chronic small vessel type white matter changes.        Impression:       1.  Large region of acute ischemia corresponding to the anterior portion of the right MCA territory as detailed above.  2.  No additional intracranial lesions.     Signer Name: Edgar Caballero MD   Signed: 3/3/2021 5:33 AM   Workstation Name: [email or web address]    [facility]     XR Chest 1 View [[id number]] Collected: 03/03/21 0444        Updated: 03/03/21 0447     Narrative:       CHEST X-RAY, 3/3/2021       HISTORY:    85-year-old male hospital inpatient  undergoing acute stroke evaluation. New onset neurologic deficit tonight.       TECHNIQUE:  AP portable chest x-ray.       FINDINGS:  Mildly prominent and indistinct diffuse interstitial markings may represent mild pulmonary edema. There is more dense airspace opacification of the left lower lobe which may represent lung consolidation and/or left pleural effusion.     Chronic pleural calcification at the right lung base and along the descending thoracic aorta. Surgical clip at the level of the aortic arch. Chronic pleural thickening at the left lung apex.        Impression:       Suspected mild vascular congestion. Left lung base consolidation and/or pleural effusion.     Signer Name: Edgar Caballero MD   Signed: 3/3/2021 4:44 AM   Workstation Name: [email or web address]    [facility] [place]        Impression:   85 y.o. male with baseline dementia and severe dysphagia; recurrent hospitalization.  Now with acute R MCA ischemic stroke, colitis, and GIB s/p clipping Dieulafoy ulcer.      Symptoms:  Encephalopathy  Debility  Dyspnea  Restlessness  Dysphagia     Plan:   -Hospice GIP for comfort focused end of life care.  -Generous PRN medications ordered to manage distressing symptoms of pain, dyspnea, nausea, anxiety, restlessness, agitation, fever, constipation, and secretions.    -Support and education provided to family from the multidisciplinary Hospice team.  -Prognosis hours to days.          Ciara Sal MD  03/04/21

## 2021-03-06 NOTE — SIGNIFICANT NOTE
Exam confirms with auscultation zero audible heart tones and zero audible respirations. Mr.Charles LEIGH White was pronounced dead at 0810, 03/06/2021.  MD notified by Patient's RN.    Raphael Wild RN  Clinical House Supervisor  3/6/2021 08:29 EST

## 2021-03-06 NOTE — NURSING NOTE
Pt has rested all shift. Deep apneic respirations (10 breathes a minute). PRNs given for congestion. Will CTM and provide care.

## 2021-03-08 NOTE — DISCHARGE SUMMARY
Date of Death: 3/6/2021  Time of Death:  0810    Presenting Problem/History of Present Illness    Acute right MCA stroke (CMS/[facility])        Hospital Course    Per Hospitalist H&P:    Amador White is a 85 y.o. male with a medical hx significant for hypertension, hyperlipidemia, hypothyroidism, Parkinson's disease, asthma, bipolar 1 disorer with depression. The patient was brought to [facility] for bright red blood per rectum and abdominal pain.  History is primarily obtained from the patient's wife and personal records sent with the patient. The patient was recently hospitalized several times at [place] for a persistent fever thought to be from a UTI and then subsequently pneumonia. He was discharged to [facility] at the end of January/beginning of February with a PICC line for intravenous antibiotics. The patient's wife reports he has been doing well since that time. Records sent from [facility] indicate the patient has been receiving 2g of Rocephin daily since 2/1/21. Labs on 2/18/21 revealed a hemoglobin on 10.5. The patient's wife states he has had issues with diarrhea/constipation for several years. No history of GI bleed, colon cancer, c.diff or colitis. Patient's sister with hx of colon cancer. The patient c/o lower abdominal pain with diarrhea today but unable to provide any other history due to dementia. Of note, the patient has been showing signs of sundowning. At his baseline the patient is orientated to time and people.      Upon arrival, the patient was hypotensive (82/48). Labs revealed hemoglobin 8.6, platelets 109, lactate 2.3, potassium 2.9, calcium 8.5, BUN/Cr 37.1. Urinalysis positive for trace leukocytes, nitrites without bacteria.  COVID-19 not detected. CT abdomen pelvis demonstrates wall thickening and edema of the sigmoid colon and rectum compatible with colitis and proctitis with severe fecal loading of the colon, pneumobilia, and aneurysmal dilatation of the abdominal aorta  measuring 3.7 cm. He was started on ceftriaxone and flagyl in the ED.    [end of copied text]    Mr. White with acute neurologic decline on 3/3; diagnostics revealed an acute Right MCA CVA. Mr. White was admitted to [facility] on 3/4/2021 for mgmt of acute symptoms 2/2 CVA.     Social History:  Social History     Tobacco Use   • Smoking status: Former Smoker     Packs/day: 0.50     Years: 10.00     Pack years: 5.00     Types: Cigarettes     Quit date: 1966     Years since quittin.2   • Smokeless tobacco: Never Used   Substance Use Topics   • Alcohol use: No         Consults:   Consults     Date and Time Order Name Status Description    3/3/2021  5:59 AM Inpatient Palliative Care MD Consult Completed     3/3/2021  4:11 AM Inpatient Neurology Consult Stroke Completed     2021 10:32 PM Inpatient Gastroenterology Consult Completed     2021 12:32 AM Inpatient Infectious Diseases Consult Completed     2021 12:32 AM Inpatient Gastroenterology Consult Completed           Exam confirms with auscultation zero audible heart tones and zero audible respirations. Mr.Charles LEIGH White was pronounced dead at 0810, 2021.  MD notified by Patient's RN.     Raphael Wild, RN  Clinical House Supervisor  3/6/2021 08:29 EST     Maddy Medrano, RAMU, MHA, APRN  [facility] Care Navigators  Hospice and Palliative Care Nurse Practitioner  21  08:05 EST

## 2021-03-10 NOTE — DISCHARGE SUMMARY
[facility] Medicine Services  DISCHARGE TO INPATIENT HOSPICE    Patient Name: Amador White  : 1935  MRN: [id number]    Date of Admission: 2021  Date of Discharge to Inpatient Hospice:  Thursday 3/4/2021 ~ 10:23 am  Primary Care Physician: Mandy Shahid MD    Consults     Date and Time Order Name Status Description    3/3/2021  5:59 AM Inpatient Palliative Care MD Consult Completed     3/3/2021  4:11 AM Inpatient Neurology Consult Stroke Completed     2021 10:32 PM Inpatient Gastroenterology Consult Completed     2021 12:32 AM Inpatient Infectious Diseases Consult Completed     2021 12:32 AM Inpatient Gastroenterology Consult Completed         Hospital Course     Presenting Problem:   Colitis [K52.9]  Colitis [K52.9]    Active Hospital Problems    Diagnosis  POA   • **Colitis [K52.9]  Yes   • Acute lower GI bleeding [K92.2]  Unknown   • CHF (congestive heart failure) (CMS/[facility]) [I50.9]  Yes   • Bipolar 1 disorder (CMS/[facility]) [F31.9]  Yes   • Dementia (CMS/[facility]) [F03.90]  Yes   • Parkinson's disease (CMS/[facility]) [G20]  Yes   • Depression [F32.9]  Yes   • Hypertension [I10]  Yes   • Chronic asthma [J45.909]  Yes   • H/O aortic aneurysm repair [Z98.890, Z86.79]  Not Applicable      Resolved Hospital Problems   No resolved problems to display.          Hospital Course:  Amador White is a 85 y.o. male with history of Parkinson's disease, dementia, hypertension AAA, CHF, recent hospitalization for persistent fever suspected secondary to UTI/pneumonia currently on IV antibiotics.  Patient presented to the ED with BRBPR found to have colitis/proctitis, admitted with GI bleed secondary to stercoral ulcers. Had recurrent bleeding 3/1. Unfortunately patient had acute neurological decompensation 3/3 and was found to have massive right MCA stroke.     Metabolic encephalopathy  Large right MCA stroke  --I had a long d/w patient's wife this am. She expressed multiple times  to me that more than anything she wants to make patient comfortable with which I agree. He seems comfortable at time of my exam. Have consulted palliative as well as hospice.  --I also d/w SLP. Patient already had severe dysphagia at baseline but now is unable to initiate swallow at all and is not even safe for comfort diet. He appears to be actively dying and given his baseline dementia and overall clinical picture a PEG is not indicated.  --Cannot have asa due to GIB. NPO so no statin.  --D/C tele.     Severe Dysphagia  Prior to his CVA the patient was already having issues with aspiraiton. My partner discussed ongoing risk of aspiration from dysphagia with both the patient and his wife this morning at bedside.  We again considered the possibility of a PEG tube placement.  At that time Mr. White's wife, who makes medical decisions for him, feels that withholding food and instead giving enteral feeds would remove the great robert in life that eating often provides. Given his acute decline I would no longer even recommend PEG. Have d/w spouse.     Rectal bleeding, recurrent  Acute blood loss anemia  Dieulafoy lesion  -GI following, he is s/p C scope 2/25 with findings of multiple stercoral ulcers, likely a sequela of his recent severe constipation. Repeat flex sig 3/1 showed improvement in stercoral ulcers but with Dieulafoy lesion which was clipped and was likely source of bleeding. H/H has remained stable.  -Continue to hold asa  -CBC in am.     Proctocolitis  -CT abdomen pelvis does reveal marked wall thickening and edema of sigmoid colon and rectum with severe fecal loading, concerning for colitis and proctitis, C. difficile toxin is negative  -Continue antibiotics. Was switched to PO abx yesterday but cannot take PO at this time. Given overall C will not restart IV abx.  -Pathology from colonoscopy concerning for fragments of tubular adenoma - GI recommends surveillance flex sig in 3 months to evaluate healing of  the ulcers and evaluate for carpet polyp     Recent enterococcal septicemia  -Patient recently hospitalized at [facility], discharged on IV ampicillin,  -Discussed with Dr. 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  -ABX as above.     Thrombocytopenia, Coagulopathy  -Suspect malnutrition. Received s/p Vitamin K x1 w/ improvement in INR.  -TCP due to blood loss, better. Monitor.     Chronic left IJ thrombus  -Suspect secondary to recent PICC line in the left arm (in place for 6 weeks, now removed).  Given that the thrombus appears chronic, combined with Mr White's recent GI bleeding, ongoing anemia and thrombocytopenia, will defer anticoagulation at present as the risks appear to outweigh benefits. My partner discussed with patient and spouse at bedside, they are agreeable with this plan.  Hold asa as above.     Hypertension  Hyperlipidemia  Hypothyroidism  Hypokalemia and hypocalcemia  Abdominal aortic aneurysm  Parkinson's disease, dementia   Depression and bipolar disorder   Prolonged QTC    Amador White is a 85 y.o. male with history of Parkinson's disease, dementia, hypertension AAA, CHF, recent hospitalization for persistent fever suspected secondary to UTI/pneumonia currently on IV antibiotics.  Patient presents to the ED with BRBPR found to have colitis/proctitis, admitted with GI bleed secondary to stercoral ulcers. Had recurrent bleeding 3/1. Unfortunately patient had acute neurological decompensation 3/3 and was found to have massive right MCA stroke.     Metabolic encephalopathy  Large right MCA stroke  --I had a long d/w patient's wife this am. She expressed multiple times to me that more than anything she wants to make patient comfortable with which I agree. He seems comfortable at time of my exam. Have consulted palliative as well as hospice.  --I also d/w SLP. Patient already had severe dysphagia at baseline but  now is unable to initiate swallow at all and is not even safe for comfort diet. He appears to be actively dying and given his baseline dementia and overall clinical picture a PEG is not indicated.  --Cannot have asa due to GIB. NPO so no statin.  --D/C tele.     Severe Dysphagia  -Prior to his CVA the patient was already having issues with aspiraiton. My partner discussed ongoing risk of aspiration from dysphagia with both the patient and his wife this morning at bedside.  We again considered the possibility of a PEG tube placement.  At that time Mr. White's wife, who makes medical decisions for him, feels that withholding food and instead giving enteral feeds would remove the great robert in life that eating often provides. Given his acute decline I would no longer even recommend PEG. Have d/w spouse.     Rectal bleeding, recurrent  Acute blood loss anemia  Dieulafoy lesion  -GI following, he is s/p C scope 2/25 with findings of multiple stercoral ulcers, likely a sequela of his recent severe constipation. Repeat flex sig 3/1 showed improvement in stercoral ulcers but with Dieulafoy lesion which was clipped and was likely source of bleeding. H/H has remained stable.  -Continue to hold asa  -CBC in am.     Proctocolitis  -CT abdomen pelvis does reveal marked wall thickening and edema of sigmoid colon and rectum with severe fecal loading, concerning for colitis and proctitis, C. difficile toxin is negative  -Continue antibiotics. Was switched to PO abx yesterday but cannot take PO at this time. Given overall GOC will not restart IV abx.  -Pathology from colonoscopy concerning for fragments of tubular adenoma - GI recommends surveillance flex sig in 3 months to evaluate healing of the ulcers and evaluate for carpet polyp     Recent enterococcal septicemia  -Patient recently hospitalized at [facility], discharged on IV ampicillin,  -Discussed with Dr. 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  -ABX as above.     Thrombocytopenia, Coagulopathy  -Suspect malnutrition. Received s/p Vitamin K x1 w/ improvement in INR.  -TCP due to blood loss, better. Monitor.     Chronic left IJ thrombus  -Suspect secondary to recent PICC line in the left arm (in place for 6 weeks, now removed).  Given that the thrombus appears chronic, combined with Mr Christopher's recent GI bleeding, ongoing anemia and thrombocytopenia, will defer anticoagulation at present as the risks appear to outweigh benefits. My partner discussed with patient and spouse at bedside, they are agreeable with this plan.  Hold asa as above.     Hypertension  Hyperlipidemia  Hypothyroidism  Hypokalemia and hypocalcemia  Abdominal aortic aneurysm  Parkinson's disease, dementia   Depression and bipolar disorder   Prolonged QTC     Decision for hospice was made prior to me seeing patient for the first time on my first day on service.  I was able to talk to his wife who was bedside.  He appears comfortable.  Transitioning to inpatient hospice today.    Noted overnight significant event documented by Ermelinda Russell PA-C noted on Wednesday Morning (Code Stroke)       Day of Discharge     HPI:    Today is my first day seeing patient and he is unresponsive.  His wife has decided on inpatient hospice.  This decision was made prior to me seeing the patient for the first time.    ROS:    Unable to assess    Vital Signs:       Temp:  99.5        Pulse:  81      RR:  16        BP:  139/50      Physical Exam:    Constitutional: unresponsive  HENT: NCAT  Respiratory: poor inspiratory effort   Cardiovascular: RRR, s1 and s2  Gastrointestinal: Positive bowel sounds, soft, nontender, nondistended  Musculoskeletal: No bilateral ankle edema  Psychiatric:  Unable to assess  Skin: dry    Discharge Details     Discharge Disposition:  Transfer care to inpatient Hospice at [facility]    Time Spent on  Discharge:  33 minutes    Anthony Agustin MD  03/10/21

## (undated) DEVICE — "MH-438 A/W VLVE F/140 EVIS-140": Brand: AIR/WATER VALVE

## (undated) DEVICE — CONTN GRAD MEAS TRIANG 32OZ BLK

## (undated) DEVICE — GRADUATE CONTN 1000ML

## (undated) DEVICE — LUBE GEL ENDOGLIDE 1.1OZ

## (undated) DEVICE — SPNG ENDO BEDSIDE TUB ENZYM

## (undated) DEVICE — KT ORCA ORCAPOD DISP STRL

## (undated) DEVICE — INTRO ACCSR BLNT TP

## (undated) DEVICE — HYBRID CO2 TUBING/CAP SET FOR OLYMPUS® SCOPES & CO2 SOURCE: Brand: ERBE

## (undated) DEVICE — TUBING, SUCTION, 1/4" X 10', STRAIGHT: Brand: MEDLINE

## (undated) DEVICE — SOL IRR H2O BTL 1000ML STRL

## (undated) DEVICE — "MH-443 SUCTION VALVE F/EVIS140 EVIS160": Brand: SUCTION VALVE

## (undated) DEVICE — ENDOGATOR HYBRID TUBING KIT FOR USE WITH ENDOGATOR IRRIGATION PUMP, OLYMPUS PUMP, GI4000 ESU, AND TORRENT IRRIGATION PUMP.: Brand: ENDOGATOR KIT

## (undated) DEVICE — SINGLE-USE BIOPSY FORCEPS: Brand: RADIAL JAW 4

## (undated) DEVICE — Device: Brand: AIR/WATER CHANNEL CLEANING ADAPTER

## (undated) DEVICE — SYR LUERLOK 50ML